# Patient Record
Sex: FEMALE | Race: WHITE | NOT HISPANIC OR LATINO | Employment: OTHER | URBAN - NONMETROPOLITAN AREA
[De-identification: names, ages, dates, MRNs, and addresses within clinical notes are randomized per-mention and may not be internally consistent; named-entity substitution may affect disease eponyms.]

---

## 2017-01-23 ENCOUNTER — DOCUMENTATION (OUTPATIENT)
Dept: GASTROENTEROLOGY | Facility: CLINIC | Age: 57
End: 2017-01-23

## 2017-01-23 ENCOUNTER — TELEPHONE (OUTPATIENT)
Dept: GASTROENTEROLOGY | Facility: CLINIC | Age: 57
End: 2017-01-23

## 2017-01-23 RX ORDER — ESOMEPRAZOLE MAGNESIUM 40 MG/1
CAPSULE, DELAYED RELEASE ORAL
Qty: 90 CAPSULE | Refills: 5 | Status: SHIPPED | OUTPATIENT
Start: 2017-01-23 | End: 2020-09-21 | Stop reason: ALTCHOICE

## 2017-01-23 RX ORDER — ESOMEPRAZOLE MAGNESIUM 40 MG/1
40 CAPSULE, DELAYED RELEASE ORAL DAILY
Qty: 30 CAPSULE | Refills: 5 | Status: SHIPPED | OUTPATIENT
Start: 2017-01-23 | End: 2017-01-23 | Stop reason: SDUPTHER

## 2017-01-23 NOTE — TELEPHONE ENCOUNTER
Patient called stating that she is taking protonix and it is not working. She stated that she sometimes takes Omeprazole too and it isn't working either. She stated that she has tried Nexium in the past and it worked really well for her. Thank you.

## 2017-01-23 NOTE — PROGRESS NOTES
I called pharmacy and told them not to renew Nexium prescription that had previously been approved through automated system.

## 2017-04-25 DIAGNOSIS — K59.09 CHRONIC CONSTIPATION: Primary | ICD-10-CM

## 2017-04-25 RX ORDER — LUBIPROSTONE 24 UG/1
24 CAPSULE ORAL 2 TIMES DAILY WITH MEALS
Qty: 60 CAPSULE | Refills: 5 | Status: SHIPPED | OUTPATIENT
Start: 2017-04-25 | End: 2020-09-21

## 2017-06-07 ENCOUNTER — TRANSCRIBE ORDERS (OUTPATIENT)
Dept: ADMINISTRATIVE | Facility: HOSPITAL | Age: 57
End: 2017-06-07

## 2017-06-07 DIAGNOSIS — Z12.31 VISIT FOR SCREENING MAMMOGRAM: Primary | ICD-10-CM

## 2017-06-19 ENCOUNTER — HOSPITAL ENCOUNTER (OUTPATIENT)
Dept: MAMMOGRAPHY | Facility: HOSPITAL | Age: 57
Discharge: HOME | End: 2017-06-19
Admitting: NURSE PRACTITIONER

## 2017-06-19 DIAGNOSIS — Z12.31 VISIT FOR SCREENING MAMMOGRAM: ICD-10-CM

## 2017-06-19 PROCEDURE — G0202 SCR MAMMO BI INCL CAD: HCPCS

## 2017-06-19 PROCEDURE — G0202 SCR MAMMO BI INCL CAD: HCPCS | Performed by: RADIOLOGY

## 2017-06-19 PROCEDURE — 77063 BREAST TOMOSYNTHESIS BI: CPT

## 2017-06-19 PROCEDURE — 77063 BREAST TOMOSYNTHESIS BI: CPT | Performed by: RADIOLOGY

## 2017-08-15 ENCOUNTER — OFFICE VISIT (OUTPATIENT)
Dept: CARDIOLOGY | Facility: CLINIC | Age: 57
End: 2017-08-15

## 2017-08-15 VITALS
SYSTOLIC BLOOD PRESSURE: 116 MMHG | HEART RATE: 76 BPM | DIASTOLIC BLOOD PRESSURE: 80 MMHG | WEIGHT: 198 LBS | BODY MASS INDEX: 32.99 KG/M2 | HEIGHT: 65 IN

## 2017-08-15 DIAGNOSIS — E78.5 DYSLIPIDEMIA: ICD-10-CM

## 2017-08-15 DIAGNOSIS — I10 ESSENTIAL HYPERTENSION: ICD-10-CM

## 2017-08-15 DIAGNOSIS — R09.89 BRUIT OF RIGHT CAROTID ARTERY: ICD-10-CM

## 2017-08-15 DIAGNOSIS — M54.2 NECK PAIN: ICD-10-CM

## 2017-08-15 DIAGNOSIS — R00.2 PALPITATIONS: Primary | ICD-10-CM

## 2017-08-15 PROCEDURE — 93000 ELECTROCARDIOGRAM COMPLETE: CPT | Performed by: INTERNAL MEDICINE

## 2017-08-15 PROCEDURE — 99213 OFFICE O/P EST LOW 20 MIN: CPT | Performed by: INTERNAL MEDICINE

## 2017-08-15 RX ORDER — DILTIAZEM HYDROCHLORIDE 240 MG/1
240 CAPSULE, EXTENDED RELEASE ORAL DAILY
Qty: 30 CAPSULE | Refills: 6 | Status: SHIPPED | OUTPATIENT
Start: 2017-08-15 | End: 2018-01-10

## 2017-08-15 RX ORDER — MEDROXYPROGESTERONE ACETATE 5 MG/1
5 TABLET ORAL DAILY
COMMUNITY
End: 2020-09-21 | Stop reason: ALTCHOICE

## 2017-08-15 NOTE — PROGRESS NOTES
Juan C Connell MD  Em MONTANEZ Janes  1960      Patient Active Problem List   Diagnosis   • Palpitations   • Dyslipidemia   • Hypertension   • Gastroesophageal reflux disease without esophagitis   • Pulmonary emphysema   • Arthritis   • Hives   • Neck pain   • Bruit of right carotid artery       Dear Juan C Connell MD:    Subjective     Em Marrero is a 56 y.o. female with the above medical problems who is here today for follow-up. The patient she has been doing better since her last visit.  She continues to have shortness of breath is related to COPD but this is currently at baseline.  She does complain of some palpitations are more frequency switching from carvedilol to diltiazem.  She also complains of occasional lower extremity edema that is mild.  According to the patient she's been having right-sided neck pain for the last few weeks which is intermittent, lasts for a few minutes and resolve spontaneously.  She states she can feel her pulse mildly in her neck when this happens.        Current Outpatient Prescriptions:   •  ALLERGY SERUM INJECTION, Inject  under the skin 1 (One) Time., Disp: , Rfl:   •  amitriptyline (ELAVIL) 10 MG tablet, Take 10 mg by mouth every night., Disp: , Rfl:   •  ANORO ELLIPTA 62.5-25 MCG/INH aerosol powder , INL 1 PUFF PO D, Disp: , Rfl: 2  •  baclofen (LIORESAL) 10 MG tablet, Take 10 mg by mouth 2 (Two) Times a Day., Disp: , Rfl:   •  dapagliflozin (FARXIGA) 5 MG tablet tablet, Take 5 mg by mouth Daily., Disp: , Rfl:   •  esomeprazole (nexIUM) 40 MG capsule, TAKE 1 CAPSULE BY MOUTH DAILY 30 MINUTES BEFORE BREAKFAST, Disp: 90 capsule, Rfl: 5  •  furosemide (LASIX) 20 MG tablet, Take 20 mg by mouth as needed, Disp: , Rfl:   •  gabapentin (NEURONTIN) 300 MG capsule, Take 300 mg by mouth 2 (two) times a day, Disp: , Rfl:   •  hydrochlorothiazide (MICROZIDE) 12.5 MG capsule, Take 12.5 mg by mouth daily., Disp: , Rfl:   •  ipratropium-albuterol (COMBIVENT RESPIMAT)  MCG/ACT  inhaler, Inhale 1 puff 6 (six) times a day., Disp: , Rfl:   •  loratadine (CLARITIN) 10 MG tablet, TK 1 T PO QD, Disp: , Rfl: 11  •  lubiprostone (AMITIZA) 24 MCG capsule, Take 1 capsule by mouth 2 (Two) Times a Day With Meals., Disp: 60 capsule, Rfl: 5  •  medroxyPROGESTERone (PROVERA) 5 MG tablet, Take 5 mg by mouth Daily., Disp: , Rfl:   •  montelukast (SINGULAIR) 10 MG tablet, TK 1 T PO QD, Disp: , Rfl: 11  •  O2 (OXYGEN), Inhale 2 L/min 1 (one) time, Disp: , Rfl:   •  ondansetron (ZOFRAN) 8 MG tablet, Take 1 tablet by mouth every 8 (eight) hours as needed for nausea or vomiting., Disp: 25 tablet, Rfl: 1  •  polyethylene glycol (MIRALAX) packet, Mix one capful with any liquid once daily., Disp: 527 g, Rfl: 5  •  progesterone (PROMETRIUM) 100 MG capsule, Take 100 mg by mouth daily., Disp: , Rfl:   •  clotrimazole (LOTRIMIN) 1 % external solution, Apply  topically 2 (two) times a day., Disp: 60 mL, Rfl: 0  •  diltiazem XR (DILACOR XR) 240 MG 24 hr capsule, Take 1 capsule by mouth Daily., Disp: 30 capsule, Rfl: 6  •  pantoprazole (PROTONIX) 40 MG EC tablet, TAKE 1 TABLET BY MOUTH DAILY 30 MINUTES BEFORE BREAKFAST, Disp: 90 tablet, Rfl: 5  •  potassium chloride (K-DUR) 10 MEQ CR tablet, Take 10 mEq by mouth as needed (PRN for leg cramps)., Disp: , Rfl:     The following portions of the patient's history were reviewed and updated as appropriate: allergies, current medications, past family history, past medical history, past social history, past surgical history and problem list.    Review of Systems   Constitution: Negative for chills, diaphoresis and fever.   HENT: Negative for congestion, ear pain and hearing loss.    Cardiovascular: Positive for palpitations. Negative for chest pain, leg swelling, orthopnea, paroxysmal nocturnal dyspnea and syncope.   Respiratory: Positive for shortness of breath. Negative for cough and hemoptysis.    Endocrine: Negative for cold intolerance and heat intolerance.  "  Hematologic/Lymphatic: Does not bruise/bleed easily.   Skin: Negative for rash.   Musculoskeletal: Positive for arthritis, back pain, joint pain and neck pain (Left side ). Negative for myalgias.   Gastrointestinal: Negative for abdominal pain, constipation, diarrhea, hematemesis, hematochezia, melena, nausea and vomiting.   Genitourinary: Negative for dysuria and hematuria.   Neurological: Negative for dizziness, focal weakness and numbness.       Objective   Blood pressure 116/80, pulse 76, height 65\" (165.1 cm), weight 198 lb (89.8 kg).    Physical Exam   Constitutional: She is oriented to person, place, and time. She appears well-developed and well-nourished.   WF sitting comfortably on chair.   HENT:   Mouth/Throat: Oropharynx is clear and moist.   Eyes: EOM are normal. Pupils are equal, round, and reactive to light.   Neck: Neck supple. No JVD present. No tracheal deviation present. No thyromegaly present.   Bruit present on the right side.   Cardiovascular: Normal rate, regular rhythm, S1 normal and S2 normal.  Exam reveals no gallop and no friction rub.    No murmur heard.  Pulmonary/Chest: Effort normal and breath sounds normal. No respiratory distress. She has no wheezes. She has no rales.   Abdominal: Soft. Bowel sounds are normal. She exhibits no mass. There is no tenderness.   Musculoskeletal: Normal range of motion. She exhibits no edema.   Lymphadenopathy:     She has no cervical adenopathy.   Neurological: She is alert and oriented to person, place, and time.   Skin: Skin is warm and dry. No rash noted.   Psychiatric: She has a normal mood and affect.         ECG 12 Lead  Date/Time: 8/15/2017 11:49 AM  Performed by: ROSANNA SAWYER  Authorized by: ROSANNA SAWYER   Comparison: compared with previous ECG from 10/18/2016  Similar to previous ECG  Rhythm: sinus rhythm  Rate: normal  BPM: 70  Conduction: conduction normal  ST Segments: ST segments normal  T Waves: T waves " normal  QRS axis: normal  Other: no other findings  Clinical impression: normal ECG and low voltage            Assessment/Plan     1.  Palpitations: The patient continues to complain of palpitations occurring diltiazem dose.  At this point will increase dose to 240 mg by mouth daily and monitor for improvement.    2.  Hypertension: Patient with history of hypertension currently within normal limits.  Will once again need to monitor for stability after increasing diltiazem dose.    3.  Dyslipidemia: The patient's lipid panel shows mildly elevated lipid levels, however she states she is unable to take any kind of statin due to leg cramping.     4.  Neck pain: Patient with neck pain socially right-sided carotid bruit is concerning for possible carotid artery disease.  At this point we'll evaluate further with ultrasound.     Diagnosis Plan   1. Palpitations     2. Essential hypertension     3. Dyslipidemia     4. Neck pain  US Carotid Right   5. Bruit of right carotid artery  US Carotid Right          Return in about 3 months (around 11/15/2017).    I appreciate the opportunity to participate in this patient's cardiovascular care.    Best Regards    Leonard Olivarez

## 2017-08-21 ENCOUNTER — HOSPITAL ENCOUNTER (OUTPATIENT)
Dept: ULTRASOUND IMAGING | Facility: HOSPITAL | Age: 57
Discharge: HOME | End: 2017-08-21
Attending: INTERNAL MEDICINE | Admitting: INTERNAL MEDICINE

## 2017-08-21 DIAGNOSIS — R09.89 BRUIT OF RIGHT CAROTID ARTERY: ICD-10-CM

## 2017-08-21 DIAGNOSIS — M54.2 NECK PAIN: ICD-10-CM

## 2017-08-21 PROCEDURE — 93880 EXTRACRANIAL BILAT STUDY: CPT

## 2017-08-21 PROCEDURE — 93880 EXTRACRANIAL BILAT STUDY: CPT | Performed by: RADIOLOGY

## 2017-11-21 ENCOUNTER — OFFICE VISIT (OUTPATIENT)
Dept: CARDIOLOGY | Facility: CLINIC | Age: 57
End: 2017-11-21

## 2017-11-21 VITALS
BODY MASS INDEX: 34.16 KG/M2 | OXYGEN SATURATION: 96 % | DIASTOLIC BLOOD PRESSURE: 73 MMHG | SYSTOLIC BLOOD PRESSURE: 114 MMHG | HEART RATE: 86 BPM | HEIGHT: 65 IN | WEIGHT: 205 LBS

## 2017-11-21 DIAGNOSIS — R07.2 PRECORDIAL PAIN: Primary | ICD-10-CM

## 2017-11-21 DIAGNOSIS — E78.5 DYSLIPIDEMIA: ICD-10-CM

## 2017-11-21 DIAGNOSIS — R00.2 PALPITATIONS: ICD-10-CM

## 2017-11-21 DIAGNOSIS — I10 ESSENTIAL HYPERTENSION: ICD-10-CM

## 2017-11-21 PROCEDURE — 99214 OFFICE O/P EST MOD 30 MIN: CPT | Performed by: INTERNAL MEDICINE

## 2017-11-21 RX ORDER — DILTIAZEM HYDROCHLORIDE 300 MG/1
300 CAPSULE, COATED, EXTENDED RELEASE ORAL DAILY
Qty: 30 CAPSULE | Refills: 6 | Status: SHIPPED | OUTPATIENT
Start: 2017-11-21 | End: 2018-07-10 | Stop reason: SDUPTHER

## 2017-11-21 RX ORDER — ACETAMINOPHEN 500 MG
500 TABLET ORAL EVERY 6 HOURS PRN
COMMUNITY
End: 2022-09-12

## 2017-11-21 NOTE — PROGRESS NOTES
Juan C Connell MD  Em Marrero  1960      Patient Active Problem List   Diagnosis   • Palpitations   • Dyslipidemia   • Hypertension   • Gastroesophageal reflux disease without esophagitis   • Pulmonary emphysema   • Arthritis   • Hives   • Neck pain   • Bruit of right carotid artery   • Precordial pain       Dear Juan C Connell MD:    Subjective     Em Marrero is a 57 y.o. female with the above medical problems who is here today for follow-up. According to the patient she has been having episodes of chest pain that she describes as midsternal, oppressive, 6/10 on the pain scale and associated with shortness of breath.  According to the agent the pain has no exacerbating or ameliorating factors.  She does have baseline COPD which is managed by her primary care team.  She was basically cranial neck pain and underwent a carotid ultrasound which showed no evidence of significant carotid artery disease.        Current Outpatient Prescriptions:   •  acetaminophen (TYLENOL) 500 MG tablet, Take 500 mg by mouth Every 6 (Six) Hours As Needed for Mild Pain ., Disp: , Rfl:   •  ALLERGY SERUM INJECTION, Inject  under the skin 1 (One) Time., Disp: , Rfl:   •  amitriptyline (ELAVIL) 10 MG tablet, Take 10 mg by mouth every night., Disp: , Rfl:   •  ANORO ELLIPTA 62.5-25 MCG/INH aerosol powder , INL 1 PUFF PO D, Disp: , Rfl: 2  •  baclofen (LIORESAL) 10 MG tablet, Take 10 mg by mouth 2 (Two) Times a Day. 2 tabs of the night., Disp: , Rfl:   •  dapagliflozin (FARXIGA) 5 MG tablet tablet, Take 5 mg by mouth Daily., Disp: , Rfl:   •  diltiazem XR (DILACOR XR) 240 MG 24 hr capsule, Take 1 capsule by mouth Daily., Disp: 30 capsule, Rfl: 6  •  esomeprazole (nexIUM) 40 MG capsule, TAKE 1 CAPSULE BY MOUTH DAILY 30 MINUTES BEFORE BREAKFAST, Disp: 90 capsule, Rfl: 5  •  furosemide (LASIX) 20 MG tablet, Take 20 mg by mouth as needed, Disp: , Rfl:   •  hydrochlorothiazide (MICROZIDE) 12.5 MG capsule, Take 12.5 mg by mouth daily.,  Disp: , Rfl:   •  ipratropium-albuterol (COMBIVENT RESPIMAT)  MCG/ACT inhaler, Inhale 1 puff 6 (six) times a day., Disp: , Rfl:   •  loratadine (CLARITIN) 10 MG tablet, TK 1 T PO QD, Disp: , Rfl: 11  •  lubiprostone (AMITIZA) 24 MCG capsule, Take 1 capsule by mouth 2 (Two) Times a Day With Meals., Disp: 60 capsule, Rfl: 5  •  medroxyPROGESTERone (PROVERA) 5 MG tablet, Take 5 mg by mouth Daily., Disp: , Rfl:   •  montelukast (SINGULAIR) 10 MG tablet, TK 1 T PO QD, Disp: , Rfl: 11  •  O2 (OXYGEN), Inhale 2 L/min 1 (one) time, Disp: , Rfl:   •  ondansetron (ZOFRAN) 8 MG tablet, Take 1 tablet by mouth every 8 (eight) hours as needed for nausea or vomiting., Disp: 25 tablet, Rfl: 1  •  diltiaZEM CD (CARDIZEM CD) 300 MG 24 hr capsule, Take 1 capsule by mouth Daily., Disp: 30 capsule, Rfl: 6    The following portions of the patient's history were reviewed and updated as appropriate: allergies, current medications, past family history, past medical history, past social history, past surgical history and problem list.    Review of Systems   Constitution: Negative for chills, diaphoresis and fever.   HENT: Negative for congestion, ear pain and sore throat.    Eyes: Negative for blurred vision, pain and redness.   Cardiovascular: Positive for chest pain and palpitations. Negative for leg swelling, orthopnea, paroxysmal nocturnal dyspnea and syncope.   Respiratory: Negative for cough, hemoptysis and shortness of breath.    Endocrine: Negative for cold intolerance and heat intolerance.   Hematologic/Lymphatic: Does not bruise/bleed easily.   Skin: Negative for rash.   Musculoskeletal: Positive for arthritis, back pain, joint pain, joint swelling and stiffness. Negative for myalgias.   Gastrointestinal: Negative for abdominal pain, constipation, diarrhea, hematemesis, hematochezia, melena, nausea and vomiting.   Genitourinary: Negative for dysuria and hematuria.   Neurological: Negative for dizziness, focal weakness and  "numbness.   Psychiatric/Behavioral: Negative for depression. The patient is not nervous/anxious.        Objective   Blood pressure 114/73, pulse 86, height 65\" (165.1 cm), weight 205 lb (93 kg), SpO2 96 %.    Physical Exam   Constitutional: She is oriented to person, place, and time. She appears well-developed and well-nourished.   WF sitting comfortably on chair.   HENT:   Mouth/Throat: Oropharynx is clear and moist.   Eyes: EOM are normal. Pupils are equal, round, and reactive to light.   Neck: Neck supple. No JVD present. No tracheal deviation present. No thyromegaly present.   Bruit present on the right side.   Cardiovascular: Normal rate, regular rhythm, S1 normal and S2 normal.  Exam reveals no gallop and no friction rub.    No murmur heard.  Pulmonary/Chest: Effort normal. No respiratory distress. She has no wheezes. She has rales.   Abdominal: Soft. Bowel sounds are normal. She exhibits no mass. There is no tenderness.   Musculoskeletal: Normal range of motion. She exhibits no edema.   Lymphadenopathy:     She has no cervical adenopathy.   Neurological: She is alert and oriented to person, place, and time.   Skin: Skin is warm and dry. No rash noted.   Psychiatric: She has a normal mood and affect.       Procedures    Assessment/Plan     1.  Chest pain: Patient with chest pain concerning for coronary artery disease.  At this point we will evaluate further with stress test.  She is unable to complete a treadmill stress test would request a nuclear stress test.  We'll also request an echocardiogram.    2.  Palpitations: According to the patient she has continued to have palpitations on current diltiazem dose.  This point we will increase to 300 mg by mouth daily.     3.  Hypertension: Patient with history of hypertension currently within normal limits.  Will once again need to monitor for stability after increasing diltiazem dose.    4.  Dyslipidemia: The patient's lipid panel shows mildly elevated lipid " levels, however she states she is unable to take any kind of statin due to leg cramping.  We will recheck a lipid panel.  I emphasized the need for decrease cholesterol intake.    5.  Neck pain: The patient underwent an echocardiogram which showed no evidence of significant cartilage artery disease.  She states her neck pain is now resolved.  At this point we'll continue to monitor for recurrence.       Diagnosis Plan   1. Precordial pain  Stress Test With Myocardial Perfusion (1 Day)    Adult Transthoracic Echo Complete W/ Cont if Necessary Per Protocol   2. Palpitations     3. Essential hypertension  Comprehensive Metabolic Panel   4. Dyslipidemia  Lipid Panel          Return in about 4 weeks (around 12/19/2017).    I appreciate the opportunity to participate in this patient's cardiovascular care.    Best Regards    Leonard Olivarez

## 2017-11-26 DIAGNOSIS — K59.09 CHRONIC CONSTIPATION: ICD-10-CM

## 2017-11-27 RX ORDER — LUBIPROSTONE 24 UG/1
CAPSULE, GELATIN COATED ORAL
Qty: 60 CAPSULE | Refills: 0 | OUTPATIENT
Start: 2017-11-27

## 2017-12-01 ENCOUNTER — TELEPHONE (OUTPATIENT)
Dept: CARDIOLOGY | Facility: CLINIC | Age: 57
End: 2017-12-01

## 2017-12-01 NOTE — TELEPHONE ENCOUNTER
Called Pt and informed her that she had missed her labs. Pt stated she was unaware that she needed labs and stated she would go Monday and get them done.

## 2017-12-04 ENCOUNTER — LAB (OUTPATIENT)
Dept: LAB | Facility: HOSPITAL | Age: 57
End: 2017-12-04

## 2017-12-04 DIAGNOSIS — I10 ESSENTIAL HYPERTENSION: ICD-10-CM

## 2017-12-04 DIAGNOSIS — E78.5 DYSLIPIDEMIA: ICD-10-CM

## 2017-12-04 LAB
ALBUMIN SERPL-MCNC: 4.2 G/DL (ref 3.5–5)
ALBUMIN/GLOB SERPL: 1.5 G/DL (ref 1.5–2.5)
ALP SERPL-CCNC: 98 U/L (ref 35–104)
ALT SERPL W P-5'-P-CCNC: 25 U/L (ref 10–36)
ANION GAP SERPL CALCULATED.3IONS-SCNC: 5.5 MMOL/L (ref 3.6–11.2)
AST SERPL-CCNC: 16 U/L (ref 10–30)
BILIRUB SERPL-MCNC: 0.4 MG/DL (ref 0.2–1.8)
BUN BLD-MCNC: 12 MG/DL (ref 7–21)
BUN/CREAT SERPL: 14 (ref 7–25)
CALCIUM SPEC-SCNC: 9.4 MG/DL (ref 7.7–10)
CHLORIDE SERPL-SCNC: 107 MMOL/L (ref 99–112)
CHOLEST SERPL-MCNC: 237 MG/DL (ref 0–200)
CO2 SERPL-SCNC: 26.5 MMOL/L (ref 24.3–31.9)
CREAT BLD-MCNC: 0.86 MG/DL (ref 0.43–1.29)
GFR SERPL CREATININE-BSD FRML MDRD: 68 ML/MIN/1.73
GLOBULIN UR ELPH-MCNC: 2.8 GM/DL
GLUCOSE BLD-MCNC: 136 MG/DL (ref 70–110)
HDLC SERPL-MCNC: 41 MG/DL (ref 60–100)
LDLC SERPL CALC-MCNC: 171 MG/DL (ref 0–100)
LDLC/HDLC SERPL: 4.17 {RATIO}
OSMOLALITY SERPL CALC.SUM OF ELEC: 279.4 MOSM/KG (ref 273–305)
POTASSIUM BLD-SCNC: 3.9 MMOL/L (ref 3.5–5.3)
PROT SERPL-MCNC: 7 G/DL (ref 6–8)
SODIUM BLD-SCNC: 139 MMOL/L (ref 135–153)
TRIGL SERPL-MCNC: 126 MG/DL (ref 0–150)
VLDLC SERPL-MCNC: 25.2 MG/DL

## 2017-12-04 PROCEDURE — 80053 COMPREHEN METABOLIC PANEL: CPT

## 2017-12-04 PROCEDURE — 36415 COLL VENOUS BLD VENIPUNCTURE: CPT

## 2017-12-04 PROCEDURE — 80061 LIPID PANEL: CPT

## 2017-12-13 ENCOUNTER — HOSPITAL ENCOUNTER (OUTPATIENT)
Dept: NUCLEAR MEDICINE | Facility: HOSPITAL | Age: 57
Discharge: HOME | End: 2017-12-13
Attending: INTERNAL MEDICINE

## 2017-12-13 ENCOUNTER — HOSPITAL ENCOUNTER (OUTPATIENT)
Dept: CARDIOLOGY | Facility: HOSPITAL | Age: 57
Discharge: HOME | End: 2017-12-13
Attending: INTERNAL MEDICINE

## 2017-12-13 DIAGNOSIS — R07.2 PRECORDIAL PAIN: ICD-10-CM

## 2017-12-13 LAB
BH CV ECHO MEAS - % IVS THICK: 6.4 %
BH CV ECHO MEAS - % LVPW THICK: 27.7 %
BH CV ECHO MEAS - ACS: 1.9 CM
BH CV ECHO MEAS - AO MAX PG: 10.9 MMHG
BH CV ECHO MEAS - AO MEAN PG: 4.6 MMHG
BH CV ECHO MEAS - AO ROOT AREA: 8.8 CM^2
BH CV ECHO MEAS - AO ROOT DIAM: 3.3 CM
BH CV ECHO MEAS - AO V2 MAX: 165.4 CM/SEC
BH CV ECHO MEAS - AO V2 MEAN: 99.2 CM/SEC
BH CV ECHO MEAS - AO V2 VTI: 34.6 CM
BH CV ECHO MEAS - CONTRAST EF 4CH: 62.5 ML/M^2
BH CV ECHO MEAS - EDV(CUBED): 111.4 ML
BH CV ECHO MEAS - EDV(MOD-SP4): 32 ML
BH CV ECHO MEAS - EDV(TEICH): 108.1 ML
BH CV ECHO MEAS - EF(CUBED): 67.4 %
BH CV ECHO MEAS - EF(MOD-SP4): 62.5 %
BH CV ECHO MEAS - EF(TEICH): 58.9 %
BH CV ECHO MEAS - ESV(CUBED): 36.3 ML
BH CV ECHO MEAS - ESV(MOD-SP4): 12 ML
BH CV ECHO MEAS - ESV(TEICH): 44.5 ML
BH CV ECHO MEAS - FS: 31.2 %
BH CV ECHO MEAS - IVS/LVPW: 0.95
BH CV ECHO MEAS - IVSD: 1 CM
BH CV ECHO MEAS - IVSS: 1.1 CM
BH CV ECHO MEAS - LA DIMENSION: 2.8 CM
BH CV ECHO MEAS - LA/AO: 0.82
BH CV ECHO MEAS - LV MASS(C)D: 182.2 GRAMS
BH CV ECHO MEAS - LV MASS(C)S: 130.3 GRAMS
BH CV ECHO MEAS - LVIDD: 4.8 CM
BH CV ECHO MEAS - LVIDS: 3.3 CM
BH CV ECHO MEAS - LVLD AP4: 5.4 CM
BH CV ECHO MEAS - LVLS AP4: 4.6 CM
BH CV ECHO MEAS - LVOT AREA (M): 3.5 CM^2
BH CV ECHO MEAS - LVOT AREA: 3.6 CM^2
BH CV ECHO MEAS - LVOT DIAM: 2.1 CM
BH CV ECHO MEAS - LVPWD: 1.1 CM
BH CV ECHO MEAS - LVPWS: 1.4 CM
BH CV ECHO MEAS - MV A MAX VEL: 65.2 CM/SEC
BH CV ECHO MEAS - MV E MAX VEL: 57.3 CM/SEC
BH CV ECHO MEAS - MV E/A: 0.88
BH CV ECHO MEAS - PA ACC SLOPE: 1026 CM/SEC^2
BH CV ECHO MEAS - PA ACC TIME: 0.09 SEC
BH CV ECHO MEAS - PA PR(ACCEL): 39.4 MMHG
BH CV ECHO MEAS - RAP SYSTOLE: 10 MMHG
BH CV ECHO MEAS - RVDD: 2.5 CM
BH CV ECHO MEAS - RVSP: 33.1 MMHG
BH CV ECHO MEAS - SV(AO): 304.2 ML
BH CV ECHO MEAS - SV(CUBED): 75.1 ML
BH CV ECHO MEAS - SV(MOD-SP4): 20 ML
BH CV ECHO MEAS - SV(TEICH): 63.6 ML
BH CV ECHO MEAS - TR MAX VEL: 240.3 CM/SEC
BH CV NUCLEAR PRIOR STUDY: 3
BH CV STRESS BP STAGE 1: NORMAL
BH CV STRESS BP STAGE 2: NORMAL
BH CV STRESS COMMENTS STAGE 1: NORMAL
BH CV STRESS COMMENTS STAGE 2: NORMAL
BH CV STRESS DOSE REGADENOSON STAGE 1: 0.4
BH CV STRESS DURATION MIN STAGE 1: 0
BH CV STRESS DURATION MIN STAGE 2: 4
BH CV STRESS DURATION SEC STAGE 1: 15
BH CV STRESS DURATION SEC STAGE 2: 0
BH CV STRESS HR STAGE 1: 115
BH CV STRESS HR STAGE 2: 99
BH CV STRESS PROTOCOL 1: NORMAL
BH CV STRESS RECOVERY BP: NORMAL MMHG
BH CV STRESS RECOVERY HR: 99 BPM
BH CV STRESS STAGE 1: 1
BH CV STRESS STAGE 2: 2
LV EF 2D ECHO EST: 65 %
LV EF NUC BP: 65 %
MAXIMAL PREDICTED HEART RATE: 162 BPM
MAXIMAL PREDICTED HEART RATE: 162 BPM
PERCENT MAX PREDICTED HR: 70.99 %
STRESS BASELINE BP: NORMAL MMHG
STRESS BASELINE HR: 97 BPM
STRESS PERCENT HR: 84 %
STRESS POST PEAK BP: NORMAL MMHG
STRESS POST PEAK HR: 115 BPM
STRESS TARGET HR: 138 BPM
STRESS TARGET HR: 138 BPM

## 2017-12-13 PROCEDURE — 93017 CV STRESS TEST TRACING ONLY: CPT

## 2017-12-13 PROCEDURE — 93018 CV STRESS TEST I&R ONLY: CPT | Performed by: INTERNAL MEDICINE

## 2017-12-13 PROCEDURE — 0 TECHNETIUM SESTAMIBI: Performed by: INTERNAL MEDICINE

## 2017-12-13 PROCEDURE — A9500 TC99M SESTAMIBI: HCPCS | Performed by: INTERNAL MEDICINE

## 2017-12-13 PROCEDURE — 25010000002 REGADENOSON 0.4 MG/5ML SOLUTION: Performed by: INTERNAL MEDICINE

## 2017-12-13 PROCEDURE — 93306 TTE W/DOPPLER COMPLETE: CPT

## 2017-12-13 PROCEDURE — 93306 TTE W/DOPPLER COMPLETE: CPT | Performed by: INTERNAL MEDICINE

## 2017-12-13 PROCEDURE — 78452 HT MUSCLE IMAGE SPECT MULT: CPT | Performed by: INTERNAL MEDICINE

## 2017-12-13 PROCEDURE — 78452 HT MUSCLE IMAGE SPECT MULT: CPT

## 2017-12-13 RX ADMIN — REGADENOSON 0.4 MG: 0.08 INJECTION, SOLUTION INTRAVENOUS at 10:15

## 2017-12-13 RX ADMIN — TECHNETIUM TC-99M SESTAMIBI 1 DOSE: 1 INJECTION INTRAVENOUS at 10:15

## 2017-12-13 RX ADMIN — TECHNETIUM TC-99M SESTAMIBI 1 DOSE: 1 INJECTION INTRAVENOUS at 08:50

## 2017-12-19 ENCOUNTER — APPOINTMENT (OUTPATIENT)
Dept: GENERAL RADIOLOGY | Facility: HOSPITAL | Age: 57
End: 2017-12-19

## 2017-12-19 ENCOUNTER — HOSPITAL ENCOUNTER (EMERGENCY)
Facility: HOSPITAL | Age: 57
Discharge: HOME | End: 2017-12-19
Attending: EMERGENCY MEDICINE | Admitting: EMERGENCY MEDICINE

## 2017-12-19 VITALS
DIASTOLIC BLOOD PRESSURE: 90 MMHG | TEMPERATURE: 97.7 F | BODY MASS INDEX: 33.32 KG/M2 | SYSTOLIC BLOOD PRESSURE: 148 MMHG | HEART RATE: 80 BPM | WEIGHT: 200 LBS | RESPIRATION RATE: 20 BRPM | HEIGHT: 65 IN | OXYGEN SATURATION: 96 %

## 2017-12-19 DIAGNOSIS — J20.9 ACUTE BRONCHITIS, UNSPECIFIED ORGANISM: Primary | ICD-10-CM

## 2017-12-19 LAB
ANION GAP SERPL CALCULATED.3IONS-SCNC: 6.6 MMOL/L (ref 3.6–11.2)
BASOPHILS # BLD AUTO: 0.04 10*3/MM3 (ref 0–0.3)
BASOPHILS NFR BLD AUTO: 0.4 % (ref 0–2)
BUN BLD-MCNC: 17 MG/DL (ref 7–21)
BUN/CREAT SERPL: 18.9 (ref 7–25)
CALCIUM SPEC-SCNC: 9.5 MG/DL (ref 7.7–10)
CHLORIDE SERPL-SCNC: 105 MMOL/L (ref 99–112)
CO2 SERPL-SCNC: 29.4 MMOL/L (ref 24.3–31.9)
CREAT BLD-MCNC: 0.9 MG/DL (ref 0.43–1.29)
DEPRECATED RDW RBC AUTO: 40.6 FL (ref 37–54)
EOSINOPHIL # BLD AUTO: 0.25 10*3/MM3 (ref 0–0.7)
EOSINOPHIL NFR BLD AUTO: 2.8 % (ref 0–5)
ERYTHROCYTE [DISTWIDTH] IN BLOOD BY AUTOMATED COUNT: 13.6 % (ref 11.5–14.5)
GFR SERPL CREATININE-BSD FRML MDRD: 64 ML/MIN/1.73
GLUCOSE BLD-MCNC: 123 MG/DL (ref 70–110)
HCT VFR BLD AUTO: 44.8 % (ref 37–47)
HGB BLD-MCNC: 14.5 G/DL (ref 12–16)
IMM GRANULOCYTES # BLD: 0.05 10*3/MM3 (ref 0–0.03)
IMM GRANULOCYTES NFR BLD: 0.6 % (ref 0–0.5)
LYMPHOCYTES # BLD AUTO: 3.67 10*3/MM3 (ref 1–3)
LYMPHOCYTES NFR BLD AUTO: 41.2 % (ref 21–51)
MCH RBC QN AUTO: 26.5 PG (ref 27–33)
MCHC RBC AUTO-ENTMCNC: 32.4 G/DL (ref 33–37)
MCV RBC AUTO: 81.8 FL (ref 80–94)
MONOCYTES # BLD AUTO: 0.66 10*3/MM3 (ref 0.1–0.9)
MONOCYTES NFR BLD AUTO: 7.4 % (ref 0–10)
NEUTROPHILS # BLD AUTO: 4.23 10*3/MM3 (ref 1.4–6.5)
NEUTROPHILS NFR BLD AUTO: 47.6 % (ref 30–70)
OSMOLALITY SERPL CALC.SUM OF ELEC: 284.2 MOSM/KG (ref 273–305)
PLATELET # BLD AUTO: 261 10*3/MM3 (ref 130–400)
PMV BLD AUTO: 10.4 FL (ref 6–10)
POTASSIUM BLD-SCNC: 3.7 MMOL/L (ref 3.5–5.3)
RBC # BLD AUTO: 5.48 10*6/MM3 (ref 4.2–5.4)
SODIUM BLD-SCNC: 141 MMOL/L (ref 135–153)
WBC NRBC COR # BLD: 8.9 10*3/MM3 (ref 4.5–12.5)

## 2017-12-19 PROCEDURE — 80048 BASIC METABOLIC PNL TOTAL CA: CPT | Performed by: PHYSICIAN ASSISTANT

## 2017-12-19 PROCEDURE — 25010000002 CEFTRIAXONE PER 250 MG: Performed by: PHYSICIAN ASSISTANT

## 2017-12-19 PROCEDURE — 94640 AIRWAY INHALATION TREATMENT: CPT

## 2017-12-19 PROCEDURE — 99283 EMERGENCY DEPT VISIT LOW MDM: CPT

## 2017-12-19 PROCEDURE — 96372 THER/PROPH/DIAG INJ SC/IM: CPT

## 2017-12-19 PROCEDURE — 85025 COMPLETE CBC W/AUTO DIFF WBC: CPT | Performed by: PHYSICIAN ASSISTANT

## 2017-12-19 PROCEDURE — 94799 UNLISTED PULMONARY SVC/PX: CPT

## 2017-12-19 PROCEDURE — 71020 XR CHEST 2 VW: CPT | Performed by: RADIOLOGY

## 2017-12-19 PROCEDURE — 36415 COLL VENOUS BLD VENIPUNCTURE: CPT

## 2017-12-19 PROCEDURE — 71020 HC CHEST PA AND LATERAL: CPT

## 2017-12-19 RX ORDER — IPRATROPIUM BROMIDE AND ALBUTEROL SULFATE 2.5; .5 MG/3ML; MG/3ML
3 SOLUTION RESPIRATORY (INHALATION) ONCE
Status: COMPLETED | OUTPATIENT
Start: 2017-12-19 | End: 2017-12-19

## 2017-12-19 RX ORDER — LIDOCAINE HYDROCHLORIDE 10 MG/ML
2.1 INJECTION, SOLUTION EPIDURAL; INFILTRATION; INTRACAUDAL; PERINEURAL ONCE
Status: COMPLETED | OUTPATIENT
Start: 2017-12-19 | End: 2017-12-19

## 2017-12-19 RX ORDER — ALBUTEROL SULFATE 2.5 MG/3ML
2.5 SOLUTION RESPIRATORY (INHALATION) EVERY 4 HOURS PRN
Qty: 3 ML | Refills: 0 | Status: SHIPPED | OUTPATIENT
Start: 2017-12-19 | End: 2018-01-18

## 2017-12-19 RX ORDER — AZITHROMYCIN 250 MG/1
TABLET, FILM COATED ORAL
Qty: 6 TABLET | Refills: 0 | Status: SHIPPED | OUTPATIENT
Start: 2017-12-19 | End: 2018-08-16

## 2017-12-19 RX ORDER — BENZONATATE 100 MG/1
100 CAPSULE ORAL 3 TIMES DAILY PRN
Qty: 30 CAPSULE | Refills: 0 | Status: SHIPPED | OUTPATIENT
Start: 2017-12-19 | End: 2018-08-16

## 2017-12-19 RX ORDER — CEFTRIAXONE 1 G/1
1000 INJECTION, POWDER, FOR SOLUTION INTRAMUSCULAR; INTRAVENOUS ONCE
Status: COMPLETED | OUTPATIENT
Start: 2017-12-19 | End: 2017-12-19

## 2017-12-19 RX ADMIN — IPRATROPIUM BROMIDE AND ALBUTEROL SULFATE 3 ML: .5; 3 SOLUTION RESPIRATORY (INHALATION) at 12:36

## 2017-12-19 RX ADMIN — CEFTRIAXONE 1000 MG: 1 INJECTION, POWDER, FOR SOLUTION INTRAMUSCULAR; INTRAVENOUS at 14:28

## 2017-12-19 RX ADMIN — LIDOCAINE HYDROCHLORIDE 2.1 ML: 10 INJECTION, SOLUTION EPIDURAL; INFILTRATION; INTRACAUDAL; PERINEURAL at 14:28

## 2017-12-19 NOTE — ED PROVIDER NOTES
Subjective   Patient is a 57 y.o. female presenting with URI.   URI   Presenting symptoms: congestion and cough    Congestion:     Location:  Nasal    Interferes with sleep: no      Interferes with eating/drinking: no    Cough:     Cough characteristics:  Productive    Sputum characteristics:  Yellow    Severity:  Moderate    Onset quality:  Gradual    Duration:  3 weeks    Timing:  Constant    Progression:  Worsening    Chronicity:  New  Severity:  Moderate  Onset quality:  Gradual  Duration:  3 weeks  Timing:  Constant  Progression:  Worsening  Chronicity:  New  Relieved by:  Nothing  Worsened by:  Nothing  Ineffective treatments:  None tried  Associated symptoms: no arthralgias, no headaches, no myalgias, no neck pain, no sinus pain, no sneezing, no swollen glands and no wheezing    Risk factors: not elderly, no chronic cardiac disease, no chronic kidney disease, no chronic respiratory disease, no diabetes mellitus, no immunosuppression, no recent illness, no recent travel and no sick contacts        Review of Systems   HENT: Positive for congestion. Negative for sinus pain and sneezing.    Respiratory: Positive for cough. Negative for wheezing.    Musculoskeletal: Negative for arthralgias, myalgias and neck pain.   Neurological: Negative for headaches.   All other systems reviewed and are negative.      Past Medical History:   Diagnosis Date   • Dyslipidemia    • GERD (gastroesophageal reflux disease)    • Hypertension    • Hypokalemia    • Palpitations        Allergies   Allergen Reactions   • Coreg [Carvedilol] Shortness Of Breath   • Dexamethasone    • Ketorolac    • Ketorolac Tromethamine    • Meloxicam      Blister on lip   • Metoprolol    • Synthroid [Levothyroxine Sodium]      Blister on lip       Past Surgical History:   Procedure Laterality Date   • BLADDER SURGERY     • BREAST BIOPSY Right 8 YRS AGO   • CHOLECYSTECTOMY     • COLONOSCOPY      Negative   • CORONARY ANGIOPLASTY Left 2000   • HYSTERECTOMY          Family History   Problem Relation Age of Onset   • Breast cancer Sister    • Coronary artery disease Sister 50       Social History     Social History   • Marital status: Legally      Spouse name: N/A   • Number of children: N/A   • Years of education: N/A     Social History Main Topics   • Smoking status: Current Every Day Smoker     Types: Electronic Cigarette   • Smokeless tobacco: Not on file   • Alcohol use No   • Drug use: No   • Sexual activity: Not on file     Other Topics Concern   • Not on file     Social History Narrative           Objective   Physical Exam   Constitutional: She is oriented to person, place, and time. Vital signs are normal. She appears well-developed and well-nourished.   HENT:   Head: Normocephalic and atraumatic.   Right Ear: External ear normal.   Left Ear: External ear normal.   Nose: Mucosal edema and rhinorrhea present.   Mouth/Throat: Uvula is midline and oropharynx is clear and moist.   Eyes: Conjunctivae and EOM are normal. Pupils are equal, round, and reactive to light.   Neck: Normal range of motion. Neck supple.   Cardiovascular: Normal rate, regular rhythm and normal heart sounds.    Pulmonary/Chest: Effort normal and breath sounds normal.   Abdominal: Soft.   Neurological: She is alert and oriented to person, place, and time. She has normal reflexes. GCS eye subscore is 4. GCS verbal subscore is 5. GCS motor subscore is 6.   Skin: Skin is warm and dry.   Nursing note and vitals reviewed.      Procedures         ED Course  ED Course                  MDM  Number of Diagnoses or Management Options  Acute bronchitis, unspecified organism: new and requires workup     Amount and/or Complexity of Data Reviewed  Clinical lab tests: reviewed and ordered  Tests in the radiology section of CPT®: reviewed and ordered  Tests in the medicine section of CPT®: ordered and reviewed    Risk of Complications, Morbidity, and/or Mortality  Presenting problems:  moderate  Diagnostic procedures: moderate  Management options: moderate    Patient Progress  Patient progress: stable      Final diagnoses:   None            ADALID Ulloa  12/19/17 [id number]

## 2018-01-10 ENCOUNTER — OFFICE VISIT (OUTPATIENT)
Dept: CARDIOLOGY | Facility: CLINIC | Age: 58
End: 2018-01-10

## 2018-01-10 VITALS
RESPIRATION RATE: 16 BRPM | SYSTOLIC BLOOD PRESSURE: 116 MMHG | WEIGHT: 205 LBS | BODY MASS INDEX: 34.16 KG/M2 | DIASTOLIC BLOOD PRESSURE: 76 MMHG | HEART RATE: 80 BPM | HEIGHT: 65 IN

## 2018-01-10 DIAGNOSIS — R07.2 PRECORDIAL PAIN: Primary | ICD-10-CM

## 2018-01-10 DIAGNOSIS — R00.2 PALPITATIONS: ICD-10-CM

## 2018-01-10 DIAGNOSIS — I10 ESSENTIAL HYPERTENSION: ICD-10-CM

## 2018-01-10 DIAGNOSIS — E78.5 DYSLIPIDEMIA: ICD-10-CM

## 2018-01-10 PROCEDURE — 99213 OFFICE O/P EST LOW 20 MIN: CPT | Performed by: INTERNAL MEDICINE

## 2018-01-10 RX ORDER — FUROSEMIDE 20 MG/1
20 TABLET ORAL AS NEEDED
Qty: 30 TABLET | Refills: 6 | Status: SHIPPED | OUTPATIENT
Start: 2018-01-10 | End: 2023-03-07 | Stop reason: SDUPTHER

## 2018-01-10 RX ORDER — ROSUVASTATIN CALCIUM 40 MG/1
40 TABLET, COATED ORAL DAILY
Qty: 30 TABLET | Refills: 11 | Status: SHIPPED | OUTPATIENT
Start: 2018-01-10 | End: 2018-01-24

## 2018-01-10 NOTE — PROGRESS NOTES
Juan C Connell MD  Em Marrero  1960      Patient Active Problem List   Diagnosis   • Palpitations   • Dyslipidemia   • Hypertension   • Gastroesophageal reflux disease without esophagitis   • Pulmonary emphysema   • Arthritis   • Hives   • Neck pain   • Bruit of right carotid artery   • Precordial pain       Dear Juan C Connell MD:    Subjective     Em Marrero is a 57 y.o. female with the above medical problems who is here today for follow-up.The patient states she has been doing fairly well since her last visit.  She states she has not had any further episode of chest pain.  She states she has been having episodes of palpitations that she describes as her heart flipping for a few minutes.  She states this episodes occur every other day and can last for a few minutes.  She denies any exacerbating or ameliorating factors.  She states the intensity is mild.  She also states she has occasional lower extremity edema for which she takes Lasix 20 mg by mouth as needed.  She states this is well controlled with this.          Current Outpatient Prescriptions:   •  acetaminophen (TYLENOL) 500 MG tablet, Take 500 mg by mouth Every 6 (Six) Hours As Needed for Mild Pain ., Disp: , Rfl:   •  albuterol (PROVENTIL) (2.5 MG/3ML) 0.083% nebulizer solution, Take 2.5 mg by nebulization Every 4 (Four) Hours As Needed for Wheezing for up to 30 days., Disp: 3 mL, Rfl: 0  •  ALLERGY SERUM INJECTION, Inject  under the skin 1 (One) Time., Disp: , Rfl:   •  amitriptyline (ELAVIL) 10 MG tablet, Take 10 mg by mouth every night., Disp: , Rfl:   •  ANORO ELLIPTA 62.5-25 MCG/INH aerosol powder , INL 1 PUFF PO D, Disp: , Rfl: 2  •  baclofen (LIORESAL) 10 MG tablet, Take 10 mg by mouth 2 (Two) Times a Day. 2 tabs of the night., Disp: , Rfl:   •  dapagliflozin (FARXIGA) 5 MG tablet tablet, Take 5 mg by mouth Daily., Disp: , Rfl:   •  diltiaZEM CD (CARDIZEM CD) 300 MG 24 hr capsule, Take 1 capsule by mouth Daily., Disp: 30 capsule, Rfl:  6  •  esomeprazole (nexIUM) 40 MG capsule, TAKE 1 CAPSULE BY MOUTH DAILY 30 MINUTES BEFORE BREAKFAST, Disp: 90 capsule, Rfl: 5  •  furosemide (LASIX) 20 MG tablet, Take 1 tablet by mouth As Needed (LE edema)., Disp: 30 tablet, Rfl: 6  •  hydrochlorothiazide (MICROZIDE) 12.5 MG capsule, Take 12.5 mg by mouth daily., Disp: , Rfl:   •  ipratropium-albuterol (COMBIVENT RESPIMAT)  MCG/ACT inhaler, Inhale 1 puff 6 (six) times a day., Disp: , Rfl:   •  lubiprostone (AMITIZA) 24 MCG capsule, Take 1 capsule by mouth 2 (Two) Times a Day With Meals., Disp: 60 capsule, Rfl: 5  •  medroxyPROGESTERone (PROVERA) 5 MG tablet, Take 5 mg by mouth Daily., Disp: , Rfl:   •  montelukast (SINGULAIR) 10 MG tablet, TK 1 T PO QD, Disp: , Rfl: 11  •  O2 (OXYGEN), Inhale 2 L/min 1 (one) time, Disp: , Rfl:   •  ondansetron (ZOFRAN) 8 MG tablet, Take 1 tablet by mouth every 8 (eight) hours as needed for nausea or vomiting., Disp: 25 tablet, Rfl: 1  •  azithromycin (ZITHROMAX Z-ALBINA) 250 MG tablet, Take 2 tablets the first day, then 1 tablet daily for 4 days., Disp: 6 tablet, Rfl: 0  •  benzonatate (TESSALON) 100 MG capsule, Take 1 capsule by mouth 3 (Three) Times a Day As Needed for Cough., Disp: 30 capsule, Rfl: 0  •  loratadine (CLARITIN) 10 MG tablet, TK 1 T PO QD, Disp: , Rfl: 11  •  rosuvastatin (CRESTOR) 40 MG tablet, Take 1 tablet by mouth Daily., Disp: 30 tablet, Rfl: 11    The following portions of the patient's history were reviewed and updated as appropriate: allergies, current medications, past family history, past medical history, past social history, past surgical history and problem list.    Review of Systems   Constitution: Negative for chills, diaphoresis, fever, weakness and malaise/fatigue.   HENT: Positive for congestion. Negative for ear pain and sore throat.    Eyes: Negative for blurred vision, pain and redness.   Cardiovascular: Positive for leg swelling and palpitations. Negative for chest pain, orthopnea and  "paroxysmal nocturnal dyspnea.   Respiratory: Negative for cough, hemoptysis and shortness of breath.    Endocrine: Negative for cold intolerance and heat intolerance.   Hematologic/Lymphatic: Does not bruise/bleed easily.   Skin: Negative for rash.   Musculoskeletal: Positive for arthritis. Negative for myalgias.   Gastrointestinal: Positive for constipation. Negative for abdominal pain, diarrhea, hematemesis, hematochezia, melena, nausea and vomiting.   Genitourinary: Negative for dysuria and hematuria.   Neurological: Negative for dizziness, focal weakness and numbness.   Psychiatric/Behavioral: Negative for depression. The patient is not nervous/anxious.        Objective   Blood pressure 116/76, pulse 80, resp. rate 16, height 165.1 cm (65\"), weight 93 kg (205 lb).    Physical Exam   Constitutional: She is oriented to person, place, and time. She appears well-developed and well-nourished.   WF sitting comfortably on chair.   HENT:   Mouth/Throat: Oropharynx is clear and moist.   Eyes: EOM are normal. Pupils are equal, round, and reactive to light.   Neck: Neck supple. No JVD present. No tracheal deviation present. No thyromegaly present.   Bruit present on the right side.   Cardiovascular: Normal rate, regular rhythm, S1 normal and S2 normal.  Exam reveals no gallop and no friction rub.    No murmur heard.  Pulmonary/Chest: Effort normal. No respiratory distress. She has no wheezes. She has rales.   Abdominal: Soft. Bowel sounds are normal. She exhibits no mass. There is no tenderness.   Musculoskeletal: Normal range of motion. She exhibits no edema.   Lymphadenopathy:     She has no cervical adenopathy.   Neurological: She is alert and oriented to person, place, and time.   Skin: Skin is warm and dry. No rash noted.   Psychiatric: She has a normal mood and affect.       Procedures    Assessment/Plan     1.  Chest pain: The patient has had no further episodes of chest pain since her last visit.  She underwent a " stress test which showed no evidence of ischemia and an echocardiogram that showed a normal ejection fraction and no wall motion abnormalities.  At this point it appears her chest pain was noncardiac in nature.  We will monitor for recurrence.    2.  Palpitations: The patient continues to complain of palpitations.  She has not undergone monitoring in over a year at this point.  We will request a Holter monitor to evaluate further.    3.  Hypertension: Patient with history of hypertension currently within normal limits.  Will once again need to monitor for stability after increasing diltiazem dose.    4.  Dyslipidemia: According to the patient she was prescribed Crestor by her primary care provider but could not afford this medication as she was told to be $300 a month.  At this point rosuvastatin is generic so this should not be that expensive.  We will repeat prescribed as a generic name and hopefully she can get this medication started.  The last lipid panel showed very abnormal lipids with an elevated LDL.       Diagnosis Plan   1. Precordial pain     2. Palpitations  Holter Monitor - 24 Hour   3. Essential hypertension     4. Dyslipidemia          Return in about 3 months (around 4/10/2018).    I appreciate the opportunity to participate in this patient's cardiovascular care.    Best Regards    Leonard Olivarez

## 2018-01-24 ENCOUNTER — TELEPHONE (OUTPATIENT)
Dept: CARDIOLOGY | Facility: CLINIC | Age: 58
End: 2018-01-24

## 2018-01-24 RX ORDER — PRAVASTATIN SODIUM 80 MG/1
80 TABLET ORAL NIGHTLY
Qty: 30 TABLET | Refills: 6 | Status: SHIPPED | OUTPATIENT
Start: 2018-01-24 | End: 2018-07-10

## 2018-01-24 NOTE — TELEPHONE ENCOUNTER
This medication should be $10.  If she cannot afford this will change the medication to simvastatin but it is likely to have a lot more side effects.

## 2018-01-24 NOTE — TELEPHONE ENCOUNTER
Patients pharmacy called stated the Rosuvastatin is still not covered on patient insurance even though it is a generic. With patient insurance this still would need to try and fail 2-3 other higher dose statins before they will pay for this medication.

## 2018-02-22 ENCOUNTER — TRANSCRIBE ORDERS (OUTPATIENT)
Dept: ADMINISTRATIVE | Facility: HOSPITAL | Age: 58
End: 2018-02-22

## 2018-02-22 DIAGNOSIS — J34.2 DEVIATED NASAL SEPTUM: Primary | ICD-10-CM

## 2018-03-05 ENCOUNTER — HOSPITAL ENCOUNTER (OUTPATIENT)
Dept: CT IMAGING | Facility: HOSPITAL | Age: 58
Discharge: HOME | End: 2018-03-05
Attending: OTOLARYNGOLOGY | Admitting: OTOLARYNGOLOGY

## 2018-03-05 DIAGNOSIS — J34.2 DEVIATED NASAL SEPTUM: ICD-10-CM

## 2018-03-05 PROCEDURE — 70486 CT MAXILLOFACIAL W/O DYE: CPT | Performed by: RADIOLOGY

## 2018-03-05 PROCEDURE — 70486 CT MAXILLOFACIAL W/O DYE: CPT

## 2018-04-11 ENCOUNTER — OFFICE VISIT (OUTPATIENT)
Dept: CARDIOLOGY | Facility: CLINIC | Age: 58
End: 2018-04-11

## 2018-04-11 VITALS
WEIGHT: 209 LBS | SYSTOLIC BLOOD PRESSURE: 113 MMHG | HEIGHT: 65 IN | HEART RATE: 75 BPM | BODY MASS INDEX: 34.82 KG/M2 | DIASTOLIC BLOOD PRESSURE: 77 MMHG | RESPIRATION RATE: 16 BRPM

## 2018-04-11 DIAGNOSIS — I10 ESSENTIAL HYPERTENSION: ICD-10-CM

## 2018-04-11 DIAGNOSIS — R00.2 PALPITATIONS: Primary | ICD-10-CM

## 2018-04-11 DIAGNOSIS — E78.5 DYSLIPIDEMIA: ICD-10-CM

## 2018-04-11 PROCEDURE — 99213 OFFICE O/P EST LOW 20 MIN: CPT | Performed by: INTERNAL MEDICINE

## 2018-04-11 RX ORDER — ESTRADIOL 1 MG/1
1 TABLET ORAL DAILY
COMMUNITY
End: 2021-11-16

## 2018-04-11 NOTE — PROGRESS NOTES
Juan C Connell MD  Em Marrero  1960      Patient Active Problem List   Diagnosis   • Palpitations   • Dyslipidemia   • Hypertension   • Gastroesophageal reflux disease without esophagitis   • Pulmonary emphysema   • Arthritis   • Hives   • Neck pain   • Bruit of right carotid artery   • Precordial pain       Dear Juan C Connell MD:    Subjective     Em Marrero is a 57 y.o. female with the above medical problems who is here today for follow-up.According to the patient she has been doing fairly well since her last visit.  She denies any chest pain, shortness of breath, orthopnea, PND, lower extremity edema, dizziness or syncope.  She does complain of occasional palpitations that are still present.  She states she feels racing for a few minutes after which the palpitations resolved.  She denies any exacerbating or ameliorating factors.  She denies any associated symptoms.  She states intensity is mild.  She was supposed to undergo a Holter monitor last visit but did not complete this test.  She states she no longer has to lower extremity edema done was previously a problem.  She has a history of hypertension that appears to be very well controlled on current regimen.           Current Outpatient Prescriptions:   •  acetaminophen (TYLENOL) 500 MG tablet, Take 500 mg by mouth Every 6 (Six) Hours As Needed for Mild Pain ., Disp: , Rfl:   •  ALLERGY SERUM INJECTION, Inject  under the skin 1 (One) Time., Disp: , Rfl:   •  amitriptyline (ELAVIL) 10 MG tablet, Take 10 mg by mouth every night., Disp: , Rfl:   •  ANORO ELLIPTA 62.5-25 MCG/INH aerosol powder , INL 1 PUFF PO D, Disp: , Rfl: 2  •  baclofen (LIORESAL) 10 MG tablet, Take 10 mg by mouth 2 (Two) Times a Day. 2 tabs of the night., Disp: , Rfl:   •  dapagliflozin (FARXIGA) 5 MG tablet tablet, Take 5 mg by mouth Daily., Disp: , Rfl:   •  diltiaZEM CD (CARDIZEM CD) 300 MG 24 hr capsule, Take 1 capsule by mouth Daily., Disp: 30 capsule, Rfl: 6  •  esomeprazole  (nexIUM) 40 MG capsule, TAKE 1 CAPSULE BY MOUTH DAILY 30 MINUTES BEFORE BREAKFAST, Disp: 90 capsule, Rfl: 5  •  estradiol (ESTRACE) 1 MG tablet, Take 1 mg by mouth Daily., Disp: , Rfl:   •  furosemide (LASIX) 20 MG tablet, Take 1 tablet by mouth As Needed (LE edema)., Disp: 30 tablet, Rfl: 6  •  hydrochlorothiazide (MICROZIDE) 12.5 MG capsule, Take 12.5 mg by mouth daily., Disp: , Rfl:   •  ipratropium-albuterol (COMBIVENT RESPIMAT)  MCG/ACT inhaler, Inhale 1 puff 6 (six) times a day., Disp: , Rfl:   •  loratadine (CLARITIN) 10 MG tablet, TK 1 T PO QD, Disp: , Rfl: 11  •  lubiprostone (AMITIZA) 24 MCG capsule, Take 1 capsule by mouth 2 (Two) Times a Day With Meals., Disp: 60 capsule, Rfl: 5  •  montelukast (SINGULAIR) 10 MG tablet, TK 1 T PO QD, Disp: , Rfl: 11  •  O2 (OXYGEN), Inhale 2 L/min 1 (one) time, Disp: , Rfl:   •  pravastatin (PRAVACHOL) 80 MG tablet, Take 1 tablet by mouth Every Night., Disp: 30 tablet, Rfl: 6  •  SITagliptin (JANUVIA) 50 MG tablet, Take 50 mg by mouth Daily., Disp: , Rfl:   •  azithromycin (ZITHROMAX Z-ALBINA) 250 MG tablet, Take 2 tablets the first day, then 1 tablet daily for 4 days., Disp: 6 tablet, Rfl: 0  •  benzonatate (TESSALON) 100 MG capsule, Take 1 capsule by mouth 3 (Three) Times a Day As Needed for Cough., Disp: 30 capsule, Rfl: 0  •  medroxyPROGESTERone (PROVERA) 5 MG tablet, Take 5 mg by mouth Daily., Disp: , Rfl:   •  ondansetron (ZOFRAN) 8 MG tablet, Take 1 tablet by mouth every 8 (eight) hours as needed for nausea or vomiting., Disp: 25 tablet, Rfl: 1    The following portions of the patient's history were reviewed and updated as appropriate: allergies, current medications, past family history, past medical history, past social history, past surgical history and problem list.    Review of Systems   Constitution: Negative for chills, diaphoresis and fever.   HENT: Negative for congestion, ear pain and sore throat.    Eyes: Negative for blurred vision, pain and redness.  "  Cardiovascular: Positive for palpitations. Negative for chest pain, leg swelling, orthopnea, paroxysmal nocturnal dyspnea and syncope.   Respiratory: Positive for shortness of breath. Negative for cough and hemoptysis.    Endocrine: Negative for cold intolerance and heat intolerance.   Hematologic/Lymphatic: Does not bruise/bleed easily.   Skin: Negative for rash.   Musculoskeletal: Negative for myalgias.   Gastrointestinal: Negative for abdominal pain, constipation, diarrhea, hematemesis, hematochezia, melena, nausea and vomiting.   Genitourinary: Negative for dysuria and hematuria.   Neurological: Negative for dizziness, focal weakness and numbness.   Psychiatric/Behavioral: Negative for depression. The patient is not nervous/anxious.        Objective   Blood pressure 113/77, pulse 75, resp. rate 16, height 165.1 cm (65\"), weight 94.8 kg (209 lb).    Physical Exam   Constitutional: She is oriented to person, place, and time. She appears well-developed and well-nourished.   WF sitting comfortably on chair.   HENT:   Mouth/Throat: Oropharynx is clear and moist.   Eyes: EOM are normal. Pupils are equal, round, and reactive to light.   Neck: Neck supple. No JVD present. No tracheal deviation present. No thyromegaly present.   Bruit present on the right side.   Cardiovascular: Normal rate, regular rhythm, S1 normal and S2 normal.  Exam reveals no gallop and no friction rub.    No murmur heard.  Pulmonary/Chest: Effort normal. No respiratory distress. She has no wheezes. She has rales.   Abdominal: Soft. Bowel sounds are normal. She exhibits no mass. There is no tenderness.   Musculoskeletal: Normal range of motion. She exhibits no edema.   Lymphadenopathy:     She has no cervical adenopathy.   Neurological: She is alert and oriented to person, place, and time.   Skin: Skin is warm and dry. No rash noted.   Psychiatric: She has a normal mood and affect.       Procedures    Assessment/Plan     1.  Palpitations: The " patient continues to complain of palpitations.  She did not complete the Holter monitor ordered on last visit.  At this point will reorder.    2.  Hypertension: Patient with history of hypertension which remains well controlled on current regimen.  At this point will continue.    3.  Dyslipidemia: Patient with a history of dyslipidemia currently tolerating pravastatin.  We will repeat a lipid panel to evaluate for improvement.     Diagnosis Plan   1. Palpitations  Holter Monitor - 24 Hour   2. Essential hypertension     3. Dyslipidemia  Lipid Panel        Return in about 3 months (around 7/11/2018).    I appreciate the opportunity to participate in this patient's cardiovascular care.    Best Regards    Leonard Olivarez

## 2018-04-16 ENCOUNTER — HOSPITAL ENCOUNTER (OUTPATIENT)
Dept: RESPIRATORY THERAPY | Facility: HOSPITAL | Age: 58
Discharge: HOME | End: 2018-04-16
Attending: INTERNAL MEDICINE | Admitting: INTERNAL MEDICINE

## 2018-04-16 ENCOUNTER — LAB (OUTPATIENT)
Dept: LAB | Facility: HOSPITAL | Age: 58
End: 2018-04-16
Attending: INTERNAL MEDICINE

## 2018-04-16 DIAGNOSIS — E78.5 DYSLIPIDEMIA: ICD-10-CM

## 2018-04-16 DIAGNOSIS — R00.2 PALPITATIONS: ICD-10-CM

## 2018-04-16 LAB
CHOLEST SERPL-MCNC: 189 MG/DL (ref 0–200)
HDLC SERPL-MCNC: 41 MG/DL (ref 60–100)
LDLC SERPL CALC-MCNC: 119 MG/DL (ref 0–100)
LDLC/HDLC SERPL: 2.91 {RATIO}
TRIGL SERPL-MCNC: 144 MG/DL (ref 0–150)
VLDLC SERPL-MCNC: 28.8 MG/DL

## 2018-04-16 PROCEDURE — 80061 LIPID PANEL: CPT

## 2018-04-16 PROCEDURE — 93226 XTRNL ECG REC<48 HR SCAN A/R: CPT

## 2018-04-16 PROCEDURE — 36415 COLL VENOUS BLD VENIPUNCTURE: CPT

## 2018-04-16 PROCEDURE — 93225 XTRNL ECG REC<48 HRS REC: CPT

## 2018-04-18 PROCEDURE — 93227 XTRNL ECG REC<48 HR R&I: CPT | Performed by: INTERNAL MEDICINE

## 2018-04-24 DIAGNOSIS — M25.532 WRIST PAIN, LEFT: Primary | ICD-10-CM

## 2018-04-25 ENCOUNTER — HOSPITAL ENCOUNTER (OUTPATIENT)
Dept: GENERAL RADIOLOGY | Facility: HOSPITAL | Age: 58
Discharge: HOME | End: 2018-04-25
Attending: ORTHOPAEDIC SURGERY | Admitting: ORTHOPAEDIC SURGERY

## 2018-04-25 ENCOUNTER — OFFICE VISIT (OUTPATIENT)
Dept: ORTHOPEDIC SURGERY | Facility: CLINIC | Age: 58
End: 2018-04-25

## 2018-04-25 VITALS — WEIGHT: 209 LBS | BODY MASS INDEX: 34.82 KG/M2 | HEIGHT: 65 IN

## 2018-04-25 DIAGNOSIS — M25.532 WRIST PAIN, LEFT: ICD-10-CM

## 2018-04-25 DIAGNOSIS — M65.4 DE QUERVAIN'S TENOSYNOVITIS, LEFT: Primary | ICD-10-CM

## 2018-04-25 PROCEDURE — 99203 OFFICE O/P NEW LOW 30 MIN: CPT | Performed by: ORTHOPAEDIC SURGERY

## 2018-04-25 PROCEDURE — 73110 X-RAY EXAM OF WRIST: CPT | Performed by: RADIOLOGY

## 2018-04-25 PROCEDURE — 73110 X-RAY EXAM OF WRIST: CPT

## 2018-04-25 RX ORDER — LUBIPROSTONE 24 UG/1
1 CAPSULE ORAL
COMMUNITY
End: 2018-04-25 | Stop reason: SDUPTHER

## 2018-04-25 RX ORDER — ONDANSETRON HYDROCHLORIDE 8 MG/1
TABLET, FILM COATED ORAL DAILY
COMMUNITY
End: 2020-09-21 | Stop reason: SDUPTHER

## 2018-04-25 RX ORDER — BACLOFEN 10 MG/1
TABLET ORAL DAILY
COMMUNITY
End: 2018-04-25 | Stop reason: SDUPTHER

## 2018-04-25 RX ORDER — PRAVASTATIN SODIUM 80 MG/1
TABLET ORAL DAILY
COMMUNITY
End: 2018-04-25 | Stop reason: SDUPTHER

## 2018-04-25 RX ORDER — ESTRADIOL 1 MG/1
TABLET ORAL DAILY
COMMUNITY
End: 2018-04-25 | Stop reason: SDUPTHER

## 2018-04-25 RX ORDER — ERGOCALCIFEROL 1.25 MG/1
5000 CAPSULE ORAL DAILY
COMMUNITY
End: 2021-11-16 | Stop reason: ALTCHOICE

## 2018-04-25 RX ORDER — MONTELUKAST SODIUM 10 MG/1
TABLET ORAL DAILY
COMMUNITY
End: 2018-04-25 | Stop reason: SDUPTHER

## 2018-04-25 RX ORDER — FUROSEMIDE 20 MG/1
TABLET ORAL DAILY
COMMUNITY
End: 2018-08-16

## 2018-04-25 RX ORDER — HYDROCHLOROTHIAZIDE 12.5 MG/1
1 CAPSULE, GELATIN COATED ORAL DAILY
COMMUNITY
Start: 2018-04-09 | End: 2018-04-25 | Stop reason: SDUPTHER

## 2018-04-25 NOTE — PROGRESS NOTES
New Patient Visit        Patient: Em Marrero  YOB: 1960  Date of encounter: 4/25/2018      History of Present Illness:   Em Marrero is a 57 y.o. female who is referred here today by Dr. Connell for evaluation of left wrist pain.  She states it started about 5 weeks ago with a popping sensation with range of motion of the thumb.  She states in the last 3 weeks she has developed significant pain in the base of the thumb that radiates down.  She states it's worse when she lifts the thumb or when she tries to  objects.  She denies paresthesias.  She states she also has known carpal tunnel syndrome and trigger fingering of the third finger but these are not bothering her.    PMH:   Patient Active Problem List   Diagnosis   • Palpitations   • Dyslipidemia   • Hypertension   • Gastroesophageal reflux disease without esophagitis   • Pulmonary emphysema   • Arthritis   • Hives   • Neck pain   • Bruit of right carotid artery   • Precordial pain     Past Medical History:   Diagnosis Date   • Dyslipidemia    • GERD (gastroesophageal reflux disease)    • Hypertension    • Hypokalemia    • Palpitations        PSH:  Past Surgical History:   Procedure Laterality Date   • BLADDER SURGERY     • BREAST BIOPSY Right 8 YRS AGO   • CHOLECYSTECTOMY     • COLONOSCOPY      Negative   • CORONARY ANGIOPLASTY Left 2000   • HYSTERECTOMY         Allergies:     Allergies   Allergen Reactions   • Coreg [Carvedilol] Shortness Of Breath   • Dexamethasone    • Ketorolac    • Ketorolac Tromethamine    • Meloxicam      Blister on lip   • Metoprolol    • Synthroid [Levothyroxine Sodium]      Blister on lip       Medications:     Current Outpatient Prescriptions:   •  ipratropium-albuterol (COMBIVENT RESPIMAT)  MCG/ACT inhaler, Inhale 1 puff., Disp: , Rfl:   •  acetaminophen (TYLENOL) 500 MG tablet, Take 500 mg by mouth Every 6 (Six) Hours As Needed for Mild Pain ., Disp: , Rfl:   •  ALLERGY SERUM INJECTION, Inject  under the  skin 1 (One) Time., Disp: , Rfl:   •  amitriptyline (ELAVIL) 10 MG tablet, Take 10 mg by mouth every night., Disp: , Rfl:   •  ANORO ELLIPTA 62.5-25 MCG/INH aerosol powder , INL 1 PUFF PO D, Disp: , Rfl: 2  •  azithromycin (ZITHROMAX Z-ALBINA) 250 MG tablet, Take 2 tablets the first day, then 1 tablet daily for 4 days., Disp: 6 tablet, Rfl: 0  •  baclofen (LIORESAL) 10 MG tablet, Take 10 mg by mouth 2 (Two) Times a Day. 2 tabs of the night., Disp: , Rfl:   •  benzonatate (TESSALON) 100 MG capsule, Take 1 capsule by mouth 3 (Three) Times a Day As Needed for Cough., Disp: 30 capsule, Rfl: 0  •  dapagliflozin (FARXIGA) 5 MG tablet tablet, Take 5 mg by mouth Daily., Disp: , Rfl:   •  diltiaZEM CD (CARDIZEM CD) 300 MG 24 hr capsule, Take 1 capsule by mouth Daily., Disp: 30 capsule, Rfl: 6  •  esomeprazole (nexIUM) 40 MG capsule, TAKE 1 CAPSULE BY MOUTH DAILY 30 MINUTES BEFORE BREAKFAST, Disp: 90 capsule, Rfl: 5  •  estradiol (ESTRACE) 1 MG tablet, Take 1 mg by mouth Daily., Disp: , Rfl:   •  furosemide (LASIX) 20 MG tablet, Take 1 tablet by mouth As Needed (LE edema)., Disp: 30 tablet, Rfl: 6  •  furosemide (LASIX) 20 MG tablet, Take  by mouth Daily., Disp: , Rfl:   •  hydrochlorothiazide (MICROZIDE) 12.5 MG capsule, Take 12.5 mg by mouth daily., Disp: , Rfl:   •  ipratropium-albuterol (COMBIVENT RESPIMAT)  MCG/ACT inhaler, Inhale 1 puff 6 (six) times a day., Disp: , Rfl:   •  loratadine (CLARITIN) 10 MG tablet, TK 1 T PO QD, Disp: , Rfl: 11  •  lubiprostone (AMITIZA) 24 MCG capsule, Take 1 capsule by mouth 2 (Two) Times a Day With Meals., Disp: 60 capsule, Rfl: 5  •  medroxyPROGESTERone (PROVERA) 5 MG tablet, Take 5 mg by mouth Daily., Disp: , Rfl:   •  montelukast (SINGULAIR) 10 MG tablet, TK 1 T PO QD, Disp: , Rfl: 11  •  O2 (OXYGEN), Inhale 2 L/min 1 (one) time, Disp: , Rfl:   •  ondansetron (ZOFRAN) 8 MG tablet, Take 1 tablet by mouth every 8 (eight) hours as needed for nausea or vomiting., Disp: 25 tablet, Rfl:  1  •  ondansetron (ZOFRAN) 8 MG tablet, Take  by mouth Daily., Disp: , Rfl:   •  pravastatin (PRAVACHOL) 80 MG tablet, Take 1 tablet by mouth Every Night., Disp: 30 tablet, Rfl: 6  •  SITagliptin (JANUVIA) 50 MG tablet, Take 50 mg by mouth Daily., Disp: , Rfl:   •  vitamin D (ERGOCALCIFEROL) 82671 units capsule capsule, Take 1 capsule by mouth Daily., Disp: , Rfl:     Social History:  Social History     Social History   • Marital status: Legally      Spouse name: N/A   • Number of children: N/A   • Years of education: N/A     Occupational History   • Not on file.     Social History Main Topics   • Smoking status: Current Every Day Smoker     Types: Electronic Cigarette   • Smokeless tobacco: Never Used   • Alcohol use No   • Drug use: No   • Sexual activity: Not on file     Other Topics Concern   • Not on file     Social History Narrative   • No narrative on file       Family History:     Family History   Problem Relation Age of Onset   • Breast cancer Sister    • Coronary artery disease Sister 50   • Osteoarthritis Sister    • Osteoporosis Sister    • Rheum arthritis Sister    • Cancer Sister    • Diabetes Sister    • Hypertension Sister    • Osteoarthritis Mother    • Osteoarthritis Father    • Heart disease Father    • Hypertension Father    • Osteoarthritis Brother    • Gout Brother    • Hypertension Brother        Review of Systems:   Review of Systems   Constitutional: Positive for fatigue.   HENT: Positive for rhinorrhea, sinus pain and sinus pressure.    Eyes: Negative.    Respiratory: Negative.    Cardiovascular: Positive for palpitations and leg swelling.   Gastrointestinal: Positive for abdominal distention, nausea and vomiting.   Endocrine: Negative.    Genitourinary: Negative.    Musculoskeletal: Positive for arthralgias, back pain and joint swelling.   Skin: Negative.    Neurological: Negative.    Hematological: Negative.    Psychiatric/Behavioral: Negative.        Physical Exam: 57 y.o.  "female  General Appearance:    Alert and oriented x 3, cooperative, in no acute distress                   Vitals:    04/25/18 1001   Weight: 94.8 kg (209 lb)   Height: 165.1 cm (65\")              Body mass index is 34.78 kg/m².        Musculoskeletal: Examination the left wrist reveals moderate tenderness along the first extensor compartment.  She has normal range of motion of the thumb and wrist.  She is positive Finkelstein's test.  Neurovascular status is intact.    Radiology:     3 views of the left wrist were reviewed revealing arthritic changes within the wrist.  There is no acute fractures or dislocations.    Assessment    ICD-10-CM ICD-9-CM   1. De Quervain's tenosynovitis, left M65.4 727.04       Plan:   A 57-year-old female with clinical evidence for de Quervain's tenosynovitis of the left wrist.  Today we discussed options including surgical intervention with de Quervain's release, injections into the first extensor compartment or immobilization with braces.  After a lengthy discussion she's decided to hold off on any surgery or injections.  She would like to try the brace first to see if this will improve his symptoms.  Today we provided her with a thumb spica brace.  She is doing with activities of the next several weeks to see if this will ease her pain.  If symptoms persist or worsen she can return back to the clinic for possible injection.    Written by, Sammie CORDOBA, acting as a scribe for Dr. Willis  "

## 2018-07-09 ENCOUNTER — HOSPITAL ENCOUNTER (OUTPATIENT)
Dept: MAMMOGRAPHY | Facility: HOSPITAL | Age: 58
Discharge: HOME | End: 2018-07-09
Admitting: NURSE PRACTITIONER

## 2018-07-09 DIAGNOSIS — Z12.39 SCREENING BREAST EXAMINATION: ICD-10-CM

## 2018-07-09 PROCEDURE — 77063 BREAST TOMOSYNTHESIS BI: CPT

## 2018-07-09 PROCEDURE — 77067 SCR MAMMO BI INCL CAD: CPT | Performed by: RADIOLOGY

## 2018-07-09 PROCEDURE — 77067 SCR MAMMO BI INCL CAD: CPT

## 2018-07-09 PROCEDURE — 77063 BREAST TOMOSYNTHESIS BI: CPT | Performed by: RADIOLOGY

## 2018-07-10 ENCOUNTER — OFFICE VISIT (OUTPATIENT)
Dept: CARDIOLOGY | Facility: CLINIC | Age: 58
End: 2018-07-10

## 2018-07-10 VITALS
BODY MASS INDEX: 34.05 KG/M2 | HEART RATE: 85 BPM | SYSTOLIC BLOOD PRESSURE: 128 MMHG | DIASTOLIC BLOOD PRESSURE: 80 MMHG | HEIGHT: 65 IN | RESPIRATION RATE: 16 BRPM | WEIGHT: 204.4 LBS

## 2018-07-10 DIAGNOSIS — E78.5 DYSLIPIDEMIA: ICD-10-CM

## 2018-07-10 DIAGNOSIS — R00.2 PALPITATIONS: Primary | ICD-10-CM

## 2018-07-10 DIAGNOSIS — I10 ESSENTIAL HYPERTENSION: ICD-10-CM

## 2018-07-10 PROCEDURE — 99213 OFFICE O/P EST LOW 20 MIN: CPT | Performed by: INTERNAL MEDICINE

## 2018-07-10 RX ORDER — ATORVASTATIN CALCIUM 40 MG/1
40 TABLET, FILM COATED ORAL DAILY
Qty: 30 TABLET | Refills: 4 | Status: SHIPPED | OUTPATIENT
Start: 2018-07-10 | End: 2019-04-29

## 2018-07-10 RX ORDER — DILTIAZEM HYDROCHLORIDE 360 MG/1
360 CAPSULE, EXTENDED RELEASE ORAL DAILY
Qty: 30 CAPSULE | Refills: 6 | Status: SHIPPED | OUTPATIENT
Start: 2018-07-10 | End: 2018-07-10 | Stop reason: SDUPTHER

## 2018-07-10 RX ORDER — DILTIAZEM HYDROCHLORIDE 360 MG/1
360 CAPSULE, EXTENDED RELEASE ORAL DAILY
Qty: 30 CAPSULE | Refills: 4 | Status: SHIPPED | OUTPATIENT
Start: 2018-07-10 | End: 2018-08-08 | Stop reason: SDUPTHER

## 2018-07-10 RX ORDER — ATORVASTATIN CALCIUM 40 MG/1
40 TABLET, FILM COATED ORAL DAILY
Qty: 30 TABLET | Refills: 11 | Status: SHIPPED | OUTPATIENT
Start: 2018-07-10 | End: 2018-07-10 | Stop reason: SDUPTHER

## 2018-07-10 NOTE — PROGRESS NOTES
Juan C Connell MD  Em Marrero  1960      Patient Active Problem List   Diagnosis   • Palpitations   • Dyslipidemia   • Hypertension   • Gastroesophageal reflux disease without esophagitis   • Pulmonary emphysema (CMS/HCC)   • Arthritis   • Hives   • Neck pain   • Bruit of right carotid artery   • Precordial pain       Dear Juan C Connell MD:    Subjective     Em Marrero is a 57 y.o. female with the above medical problems who is here today for follow-up.  According to the patient denies to complain of occasional palpitations since her last visit.  Denies any exacerbating or ameliorating factors.  She denies any associated symptoms.  She states she feels her heart racing for a few minutes after which the palpitations resolved.  She states intensity is mild.   She underwent a Holter monitor which showed no evidence of significant arrhythmias.  She does have a history of dyslipidemia which does not appear to be well-controlled on current regimen. She denies any chest pain, shortness of breath, orthopnea, PND, lower extremity edema, dizziness or syncope.            Current Outpatient Prescriptions:   •  acetaminophen (TYLENOL) 500 MG tablet, Take 500 mg by mouth Every 6 (Six) Hours As Needed for Mild Pain ., Disp: , Rfl:   •  ALLERGY SERUM INJECTION, Inject  under the skin 1 (One) Time., Disp: , Rfl:   •  amitriptyline (ELAVIL) 10 MG tablet, Take 10 mg by mouth every night., Disp: , Rfl:   •  ANORO ELLIPTA 62.5-25 MCG/INH aerosol powder , INL 1 PUFF PO D, Disp: , Rfl: 2  •  azithromycin (ZITHROMAX Z-ALBINA) 250 MG tablet, Take 2 tablets the first day, then 1 tablet daily for 4 days., Disp: 6 tablet, Rfl: 0  •  baclofen (LIORESAL) 10 MG tablet, Take 10 mg by mouth 2 (Two) Times a Day. 2 tabs of the night., Disp: , Rfl:   •  benzonatate (TESSALON) 100 MG capsule, Take 1 capsule by mouth 3 (Three) Times a Day As Needed for Cough., Disp: 30 capsule, Rfl: 0  •  dapagliflozin (FARXIGA) 5 MG tablet tablet, Take 5 mg by  mouth Daily., Disp: , Rfl:   •  diltiaZEM CD (CARDIZEM CD) 360 MG 24 hr capsule, Take 1 capsule by mouth Daily., Disp: 30 capsule, Rfl: 6  •  esomeprazole (nexIUM) 40 MG capsule, TAKE 1 CAPSULE BY MOUTH DAILY 30 MINUTES BEFORE BREAKFAST, Disp: 90 capsule, Rfl: 5  •  estradiol (ESTRACE) 1 MG tablet, Take 1 mg by mouth Daily., Disp: , Rfl:   •  furosemide (LASIX) 20 MG tablet, Take 1 tablet by mouth As Needed (LE edema)., Disp: 30 tablet, Rfl: 6  •  furosemide (LASIX) 20 MG tablet, Take  by mouth Daily., Disp: , Rfl:   •  hydrochlorothiazide (MICROZIDE) 12.5 MG capsule, Take 12.5 mg by mouth daily., Disp: , Rfl:   •  ipratropium-albuterol (COMBIVENT RESPIMAT)  MCG/ACT inhaler, Inhale 1 puff 6 (six) times a day., Disp: , Rfl:   •  ipratropium-albuterol (COMBIVENT RESPIMAT)  MCG/ACT inhaler, Inhale 1 puff., Disp: , Rfl:   •  loratadine (CLARITIN) 10 MG tablet, TK 1 T PO QD, Disp: , Rfl: 11  •  lubiprostone (AMITIZA) 24 MCG capsule, Take 1 capsule by mouth 2 (Two) Times a Day With Meals., Disp: 60 capsule, Rfl: 5  •  medroxyPROGESTERone (PROVERA) 5 MG tablet, Take 5 mg by mouth Daily., Disp: , Rfl:   •  montelukast (SINGULAIR) 10 MG tablet, TK 1 T PO QD, Disp: , Rfl: 11  •  O2 (OXYGEN), Inhale 2 L/min 1 (one) time, Disp: , Rfl:   •  ondansetron (ZOFRAN) 8 MG tablet, Take 1 tablet by mouth every 8 (eight) hours as needed for nausea or vomiting., Disp: 25 tablet, Rfl: 1  •  ondansetron (ZOFRAN) 8 MG tablet, Take  by mouth Daily., Disp: , Rfl:   •  SITagliptin (JANUVIA) 50 MG tablet, Take 50 mg by mouth Daily., Disp: , Rfl:   •  vitamin D (ERGOCALCIFEROL) 16866 units capsule capsule, Take 1 capsule by mouth Daily., Disp: , Rfl:   •  atorvastatin (LIPITOR) 40 MG tablet, Take 1 tablet by mouth Daily., Disp: 30 tablet, Rfl: 11    The following portions of the patient's history were reviewed and updated as appropriate: allergies, current medications, past family history, past medical history, past social history,  "past surgical history and problem list.    Review of Systems   Constitution: Negative for chills, diaphoresis and fever.   HENT: Negative for congestion, ear pain and sore throat.    Eyes: Negative for blurred vision, pain and redness.   Cardiovascular: Negative for chest pain, leg swelling, orthopnea, palpitations, paroxysmal nocturnal dyspnea and syncope.   Respiratory: Negative for cough, hemoptysis and shortness of breath.    Endocrine: Negative for cold intolerance and heat intolerance.   Hematologic/Lymphatic: Does not bruise/bleed easily.   Skin: Negative for rash.   Musculoskeletal: Positive for arthritis, joint pain, joint swelling and stiffness. Negative for myalgias.   Gastrointestinal: Negative for abdominal pain, constipation, diarrhea, hematemesis, hematochezia, melena, nausea and vomiting.   Genitourinary: Negative for dysuria and hematuria.   Neurological: Negative for dizziness, focal weakness and numbness.   Psychiatric/Behavioral: Negative for depression. The patient is not nervous/anxious.        Objective   Blood pressure 128/80, pulse 85, resp. rate 16, height 165.1 cm (65\"), weight 92.7 kg (204 lb 6.4 oz).    Physical Exam   Constitutional: She is oriented to person, place, and time. She appears well-developed and well-nourished.   WF sitting comfortably on chair.   HENT:   Mouth/Throat: Oropharynx is clear and moist.   Eyes: EOM are normal. Pupils are equal, round, and reactive to light.   Neck: Neck supple. No JVD present. No tracheal deviation present. No thyromegaly present.   Cardiovascular: Normal rate, regular rhythm, S1 normal and S2 normal.  Exam reveals no gallop and no friction rub.    No murmur heard.  Pulmonary/Chest: Effort normal. No respiratory distress. She has no wheezes. She has no rales.   Decrease breath sounds in both lung fields.   Abdominal: Soft. Bowel sounds are normal. She exhibits no mass. There is no tenderness.   Musculoskeletal: Normal range of motion. She " exhibits no edema.   Lymphadenopathy:     She has no cervical adenopathy.   Neurological: She is alert and oriented to person, place, and time.   Skin: Skin is warm and dry. No rash noted.   Psychiatric: She has a normal mood and affect.       Procedures    Assessment/Plan     1.  Palpitations: According to the patient even though the palpitations are improved but continued to be present.  At this point will increase her diltiazem to 360 mg by mouth daily and monitor for improvement.    2.  Hypertension: Patient with history of hypertension which remains well controlled on current regimen.  At this point will continue.    3.  Dyslipidemia: The patient the went a lipid panel which shows poor control at this point.  Have to switch over to atorvastatin 40 mg by mouth daily.  Will need to recheck on next visit.     Diagnosis Plan   1. Palpitations     2. Essential hypertension     3. Dyslipidemia          Return in about 3 months (around 10/10/2018).    I appreciate the opportunity to participate in this patient's cardiovascular care.    Best Regards    Leonard Olivarez

## 2018-07-19 ENCOUNTER — HOSPITAL ENCOUNTER (OUTPATIENT)
Dept: ULTRASOUND IMAGING | Facility: HOSPITAL | Age: 58
Discharge: HOME | End: 2018-07-19
Admitting: RADIOLOGY

## 2018-07-19 DIAGNOSIS — R92.8 ABNORMAL MAMMOGRAM: ICD-10-CM

## 2018-07-19 PROCEDURE — 76882 US LMTD JT/FCL EVL NVASC XTR: CPT

## 2018-07-19 PROCEDURE — 76882 US LMTD JT/FCL EVL NVASC XTR: CPT | Performed by: RADIOLOGY

## 2018-08-08 ENCOUNTER — TELEPHONE (OUTPATIENT)
Dept: CARDIOLOGY | Facility: CLINIC | Age: 58
End: 2018-08-08

## 2018-08-08 RX ORDER — DILTIAZEM HYDROCHLORIDE 300 MG/1
300 CAPSULE, COATED, EXTENDED RELEASE ORAL DAILY
Qty: 30 CAPSULE | Refills: 6 | Status: SHIPPED | OUTPATIENT
Start: 2018-08-08 | End: 2019-03-21 | Stop reason: SDUPTHER

## 2018-08-08 NOTE — TELEPHONE ENCOUNTER
PLEASE CALL PATIENT WHO IS HAVING A QUESTION REGARDING HER MEDICATION.  SHE STATES HER BLOOD PRESSURE SEEMS TO BE LOW.

## 2018-08-08 NOTE — TELEPHONE ENCOUNTER
Vital signs are within normal limits but if she is not feeling well we will decrease diltiazem back to 300 mg by mouth daily and monitor.

## 2018-08-08 NOTE — TELEPHONE ENCOUNTER
Called pt and she stated that she hasn't been feeling good since her Diltiazem was increased to 360 mg on 7/10/18. Her BP has been 100/54 HR 61

## 2018-08-14 ENCOUNTER — HOSPITAL ENCOUNTER (OUTPATIENT)
Dept: ULTRASOUND IMAGING | Facility: HOSPITAL | Age: 58
Discharge: HOME | End: 2018-08-14
Admitting: NURSE PRACTITIONER

## 2018-08-14 DIAGNOSIS — N63.13 BREAST LUMP ON RIGHT SIDE AT 7 O'CLOCK POSITION: ICD-10-CM

## 2018-08-14 PROCEDURE — 76642 ULTRASOUND BREAST LIMITED: CPT

## 2018-08-14 PROCEDURE — 76642 ULTRASOUND BREAST LIMITED: CPT | Performed by: RADIOLOGY

## 2018-08-14 NOTE — TELEPHONE ENCOUNTER
Called pt and advised her. She stated that she had already went back to the 300 mg and she is feeling a lot better.

## 2018-08-16 ENCOUNTER — APPOINTMENT (OUTPATIENT)
Dept: GENERAL RADIOLOGY | Facility: HOSPITAL | Age: 58
End: 2018-08-16

## 2018-08-16 ENCOUNTER — HOSPITAL ENCOUNTER (EMERGENCY)
Facility: HOSPITAL | Age: 58
Discharge: HOME | End: 2018-08-16
Attending: EMERGENCY MEDICINE | Admitting: EMERGENCY MEDICINE

## 2018-08-16 VITALS
WEIGHT: 199 LBS | RESPIRATION RATE: 16 BRPM | HEIGHT: 65 IN | BODY MASS INDEX: 33.15 KG/M2 | TEMPERATURE: 96.7 F | HEART RATE: 70 BPM | SYSTOLIC BLOOD PRESSURE: 114 MMHG | OXYGEN SATURATION: 95 % | DIASTOLIC BLOOD PRESSURE: 71 MMHG

## 2018-08-16 DIAGNOSIS — M79.671 RIGHT FOOT PAIN: Primary | ICD-10-CM

## 2018-08-16 LAB
ALBUMIN SERPL-MCNC: 3.8 G/DL (ref 3.5–5)
ALBUMIN/GLOB SERPL: 1.3 G/DL (ref 1.5–2.5)
ALP SERPL-CCNC: 84 U/L (ref 35–104)
ALT SERPL W P-5'-P-CCNC: 25 U/L (ref 10–36)
ANION GAP SERPL CALCULATED.3IONS-SCNC: 6.2 MMOL/L (ref 3.6–11.2)
AST SERPL-CCNC: 24 U/L (ref 10–30)
BASOPHILS # BLD AUTO: 0.04 10*3/MM3 (ref 0–0.3)
BASOPHILS NFR BLD AUTO: 0.4 % (ref 0–2)
BILIRUB SERPL-MCNC: 0.2 MG/DL (ref 0.2–1.8)
BUN BLD-MCNC: 13 MG/DL (ref 7–21)
BUN/CREAT SERPL: 12.9 (ref 7–25)
CALCIUM SPEC-SCNC: 9.1 MG/DL (ref 7.7–10)
CHLORIDE SERPL-SCNC: 109 MMOL/L (ref 99–112)
CO2 SERPL-SCNC: 22.8 MMOL/L (ref 24.3–31.9)
CREAT BLD-MCNC: 1.01 MG/DL (ref 0.43–1.29)
DEPRECATED RDW RBC AUTO: 40.4 FL (ref 37–54)
EOSINOPHIL # BLD AUTO: 0.4 10*3/MM3 (ref 0–0.7)
EOSINOPHIL NFR BLD AUTO: 4.1 % (ref 0–5)
ERYTHROCYTE [DISTWIDTH] IN BLOOD BY AUTOMATED COUNT: 13.7 % (ref 11.5–14.5)
ERYTHROCYTE [SEDIMENTATION RATE] IN BLOOD: 9 MM/HR (ref 0–30)
GFR SERPL CREATININE-BSD FRML MDRD: 56 ML/MIN/1.73
GLOBULIN UR ELPH-MCNC: 2.9 GM/DL
GLUCOSE BLD-MCNC: 110 MG/DL (ref 70–110)
HCT VFR BLD AUTO: 43.2 % (ref 37–47)
HGB BLD-MCNC: 14.5 G/DL (ref 12–16)
IMM GRANULOCYTES # BLD: 0.02 10*3/MM3 (ref 0–0.03)
IMM GRANULOCYTES NFR BLD: 0.2 % (ref 0–0.5)
LYMPHOCYTES # BLD AUTO: 2.24 10*3/MM3 (ref 1–3)
LYMPHOCYTES NFR BLD AUTO: 23.1 % (ref 21–51)
MCH RBC QN AUTO: 27.5 PG (ref 27–33)
MCHC RBC AUTO-ENTMCNC: 33.6 G/DL (ref 33–37)
MCV RBC AUTO: 81.8 FL (ref 80–94)
MONOCYTES # BLD AUTO: 0.64 10*3/MM3 (ref 0.1–0.9)
MONOCYTES NFR BLD AUTO: 6.6 % (ref 0–10)
NEUTROPHILS # BLD AUTO: 6.34 10*3/MM3 (ref 1.4–6.5)
NEUTROPHILS NFR BLD AUTO: 65.6 % (ref 30–70)
OSMOLALITY SERPL CALC.SUM OF ELEC: 276.4 MOSM/KG (ref 273–305)
PLATELET # BLD AUTO: 246 10*3/MM3 (ref 130–400)
PMV BLD AUTO: 10.7 FL (ref 6–10)
POTASSIUM BLD-SCNC: 4 MMOL/L (ref 3.5–5.3)
PROT SERPL-MCNC: 6.7 G/DL (ref 6–8)
RBC # BLD AUTO: 5.28 10*6/MM3 (ref 4.2–5.4)
SODIUM BLD-SCNC: 138 MMOL/L (ref 135–153)
WBC NRBC COR # BLD: 9.68 10*3/MM3 (ref 4.5–12.5)

## 2018-08-16 PROCEDURE — 85025 COMPLETE CBC W/AUTO DIFF WBC: CPT | Performed by: PHYSICIAN ASSISTANT

## 2018-08-16 PROCEDURE — 85652 RBC SED RATE AUTOMATED: CPT | Performed by: PHYSICIAN ASSISTANT

## 2018-08-16 PROCEDURE — 99283 EMERGENCY DEPT VISIT LOW MDM: CPT

## 2018-08-16 PROCEDURE — 73630 X-RAY EXAM OF FOOT: CPT

## 2018-08-16 PROCEDURE — 73630 X-RAY EXAM OF FOOT: CPT | Performed by: RADIOLOGY

## 2018-08-16 PROCEDURE — 36415 COLL VENOUS BLD VENIPUNCTURE: CPT

## 2018-08-16 PROCEDURE — 80053 COMPREHEN METABOLIC PANEL: CPT | Performed by: PHYSICIAN ASSISTANT

## 2018-08-16 NOTE — ED PROVIDER NOTES
Subjective   57-year-old white female who presents secondary to right foot pain.  She states this started yesterday.  No injury.  No redness.  She states that previously it was swollen though has improved.  She does have pain with bearing weight.  She wears flip flops continually.  She states that she has barefoot frequently in the garden.  No history of gout.  No history of inflammatory arthritis.  No fever            Review of Systems   Constitutional: Negative.  Negative for fever.   HENT: Negative.    Respiratory: Negative.    Cardiovascular: Negative.  Negative for chest pain.   Gastrointestinal: Negative.  Negative for abdominal pain.   Endocrine: Negative.    Genitourinary: Negative.  Negative for dysuria.   Skin: Negative.    Neurological: Negative.    Psychiatric/Behavioral: Negative.    All other systems reviewed and are negative.      Past Medical History:   Diagnosis Date   • Dyslipidemia    • GERD (gastroesophageal reflux disease)    • Hypertension    • Hypokalemia    • Palpitations        Allergies   Allergen Reactions   • Coreg [Carvedilol] Shortness Of Breath   • Meloxicam Other (See Comments)     Blister on lip   • Metoprolol Hives   • Dexamethasone Rash   • Ketorolac Rash   • Ketorolac Tromethamine Rash   • Synthroid [Levothyroxine Sodium] Other (See Comments)     Blister on lip       Past Surgical History:   Procedure Laterality Date   • BLADDER SURGERY     • BREAST BIOPSY Right 8 YRS AGO   • CHOLECYSTECTOMY     • COLONOSCOPY      Negative   • CORONARY ANGIOPLASTY Left 2000   • HYSTERECTOMY         Family History   Problem Relation Age of Onset   • Breast cancer Sister 66   • Coronary artery disease Sister 50   • Osteoarthritis Sister    • Osteoporosis Sister    • Rheum arthritis Sister    • Cancer Sister    • Diabetes Sister    • Hypertension Sister    • Osteoarthritis Mother    • Osteoarthritis Father    • Heart disease Father    • Hypertension Father    • Osteoarthritis Brother    • Gout  Brother    • Hypertension Brother        Social History     Social History   • Marital status: Legally      Social History Main Topics   • Smoking status: Current Every Day Smoker     Types: Electronic Cigarette   • Smokeless tobacco: Never Used   • Alcohol use No   • Drug use: No     Other Topics Concern   • Not on file           Objective   Physical Exam   Constitutional: She is oriented to person, place, and time. She appears well-developed and well-nourished. No distress.   HENT:   Head: Normocephalic and atraumatic.   Right Ear: External ear normal.   Left Ear: External ear normal.   Nose: Nose normal.   Eyes: Pupils are equal, round, and reactive to light. Conjunctivae and EOM are normal.   Neck: Normal range of motion. Neck supple. No JVD present. No tracheal deviation present.   Cardiovascular: Normal rate, regular rhythm and normal heart sounds.    No murmur heard.  Pulmonary/Chest: Effort normal and breath sounds normal. No respiratory distress. She has no wheezes.   Abdominal: Soft. Bowel sounds are normal. There is no tenderness.   Musculoskeletal: Normal range of motion. She exhibits no edema or deformity.   Patient is tender on the dorsal aspect of her right foot along the midfoot.  There is no redness.  There is no warmth.  Currently there is no swelling.  She has normal pedal pulses and normal sensation.  Her surface reveals no evidence of puncture wound or infection.  No sign of deep space infection.   Neurological: She is alert and oriented to person, place, and time. No cranial nerve deficit.   Skin: Skin is warm and dry. No rash noted. She is not diaphoretic. No erythema. No pallor.   Psychiatric: She has a normal mood and affect. Her behavior is normal. Thought content normal.   Nursing note and vitals reviewed.      Procedures           ED Course                  MDM  Number of Diagnoses or Management Options  Right foot pain: new and requires workup     Amount and/or Complexity of  Data Reviewed  Clinical lab tests: reviewed and ordered  Tests in the radiology section of CPT®: ordered and reviewed  Discuss the patient with other providers: yes  Independent visualization of images, tracings, or specimens: yes    Risk of Complications, Morbidity, and/or Mortality  Presenting problems: moderate          Final diagnoses:   Right foot pain            Jose J Stoner PA  08/16/18 1559

## 2018-10-19 ENCOUNTER — OFFICE VISIT (OUTPATIENT)
Dept: CARDIOLOGY | Facility: CLINIC | Age: 58
End: 2018-10-19

## 2018-10-19 ENCOUNTER — TELEPHONE (OUTPATIENT)
Dept: CARDIOLOGY | Facility: CLINIC | Age: 58
End: 2018-10-19

## 2018-10-19 VITALS
DIASTOLIC BLOOD PRESSURE: 74 MMHG | HEIGHT: 65 IN | BODY MASS INDEX: 34.52 KG/M2 | SYSTOLIC BLOOD PRESSURE: 111 MMHG | OXYGEN SATURATION: 97 % | WEIGHT: 207.2 LBS | HEART RATE: 75 BPM

## 2018-10-19 DIAGNOSIS — R09.89 BRUIT OF RIGHT CAROTID ARTERY: ICD-10-CM

## 2018-10-19 DIAGNOSIS — I10 ESSENTIAL HYPERTENSION: Primary | ICD-10-CM

## 2018-10-19 DIAGNOSIS — E78.5 DYSLIPIDEMIA: ICD-10-CM

## 2018-10-19 DIAGNOSIS — R00.2 PALPITATIONS: ICD-10-CM

## 2018-10-19 PROCEDURE — 93000 ELECTROCARDIOGRAM COMPLETE: CPT | Performed by: PHYSICIAN ASSISTANT

## 2018-10-19 PROCEDURE — 99213 OFFICE O/P EST LOW 20 MIN: CPT | Performed by: PHYSICIAN ASSISTANT

## 2018-10-19 NOTE — PATIENT INSTRUCTIONS
BMI for Adults  Body mass index (BMI) is a number that is calculated from a person's weight and height. In most adults, the number is used to find how much of an adult's weight is made up of fat. BMI is not as accurate as a direct measure of body fat.  HOW IS BMI CALCULATED?  BMI is calculated by dividing weight in kilograms by height in meters squared. It can also be calculated by dividing weight in pounds by height in inches squared, then multiplying the resulting number by 703. Charts are available to help you find your BMI quickly and easily without doing this calculation.   HOW IS BMI INTERPRETED?  Health care professionals use BMI charts to identify whether an adult is underweight, at a normal weight, or overweight based on the following guidelines:  · Underweight: BMI less than 18.5.  · Normal weight: BMI between 18.5 and 24.9.  · Overweight: BMI between 25 and 29.9.  · Obese: BMI of 30 and above.  BMI is usually interpreted the same for males and females.  Weight includes both fat and muscle, so someone with a muscular build, such as an athlete, may have a BMI that is higher than 24.9. In cases like these, BMI may not accurately depict body fat. To determine if excess body fat is the cause of a BMI of 25 or higher, further assessments may need to be done by a health care provider.  WHY IS BMI A USEFUL TOOL?  BMI is used to identify a possible weight problem that may be related to a medical problem or may increase the risk for medical problems. BMI can also be used to promote changes to reach a healthy weight.     This information is not intended to replace advice given to you by your health care provider. Make sure you discuss any questions you have with your health care provider.     Document Released: 08/29/2005 Document Revised: 01/08/2016 Document Reviewed: 05/15/2015  [employer] Patient Education ©2017 [employer]       Calorie Counting for Weight Loss  Calories are energy you get from the  things you eat and drink. Your body uses this energy to keep you going throughout the day. The number of calories you eat affects your weight. When you eat more calories than your body needs, your body stores the extra calories as fat. When you eat fewer calories than your body needs, your body burns fat to get the energy it needs.  Calorie counting means keeping track of how many calories you eat and drink each day. If you make sure to eat fewer calories than your body needs, you should lose weight. In order for calorie counting to work, you will need to eat the number of calories that are right for you in a day to lose a healthy amount of weight per week. A healthy amount of weight to lose per week is usually 1-2 lb (0.5-0.9 kg). A dietitian can determine how many calories you need in a day and give you suggestions on how to reach your calorie goal.   WHAT IS MY MY PLAN?  My goal is to have __________ calories per day.   If I have this many calories per day, I should lose around __________ pounds per week.  WHAT DO I NEED TO KNOW ABOUT CALORIE COUNTING?  In order to meet your daily calorie goal, you will need to:  · Find out how many calories are in each food you would like to eat. Try to do this before you eat.  · Decide how much of the food you can eat.  · Write down what you ate and how many calories it had. Doing this is called keeping a food log.  WHERE DO I FIND CALORIE INFORMATION?  The number of calories in a food can be found on a Nutrition Facts label. Note that all the information on a label is based on a specific serving of the food. If a food does not have a Nutrition Facts label, try to look up the calories online or ask your dietitian for help.  HOW DO I DECIDE HOW MUCH TO EAT?  To decide how much of the food you can eat, you will need to consider both the number of calories in one serving and the size of one serving. This information can be found on the Nutrition Facts label. If a food does not  have a Nutrition Facts label, look up the information online or ask your dietitian for help.  Remember that calories are listed per serving. If you choose to have more than one serving of a food, you will have to multiply the calories per serving by the amount of servings you plan to eat. For example, the label on a package of bread might say that a serving size is 1 slice and that there are 90 calories in a serving. If you eat 1 slice, you will have eaten 90 calories. If you eat 2 slices, you will have eaten 180 calories.  HOW DO I KEEP A FOOD LOG?  After each meal, record the following information in your food log:  · What you ate.  · How much of it you ate.  · How many calories it had.  · Then, add up your calories.  Keep your food log near you, such as in a small notebook in your pocket. Another option is to use a mobile sophy or website. Some programs will calculate calories for you and show you how many calories you have left each time you add an item to the log.  WHAT ARE SOME CALORIE COUNTING TIPS?  · Use your calories on foods and drinks that will fill you up and not leave you hungry. Some examples of this include foods like nuts and nut butters, vegetables, lean proteins, and high-fiber foods (more than 5 g fiber per serving).  · Eat nutritious foods and avoid empty calories. Empty calories are calories you get from foods or beverages that do not have many nutrients, such as candy and soda. It is better to have a nutritious high-calorie food (such as an avocado) than a food with few nutrients (such as a bag of chips).  · Know how many calories are in the foods you eat most often. This way, you do not have to look up how many calories they have each time you eat them.  · Look out for foods that may seem like low-calorie foods but are really high-calorie foods, such as baked goods, soda, and fat-free candy.  · Pay attention to calories in drinks. Drinks such as sodas, specialty coffee drinks, alcohol, and  juices have a lot of calories yet do not fill you up. Choose low-calorie drinks like water and diet drinks.  · Focus your calorie counting efforts on higher calorie items. Logging the calories in a garden salad that contains only vegetables is less important than calculating the calories in a milk shake.  · Find a way of tracking calories that works for you. Get creative. Most people who are successful find ways to keep track of how much they eat in a day, even if they do not count every calorie.  WHAT ARE SOME PORTION CONTROL TIPS?  · Know how many calories are in a serving. This will help you know how many servings of a certain food you can have.  · Use a measuring cup to measure serving sizes. This is helpful when you start out. With time, you will be able to estimate serving sizes for some foods.  · Take some time to put servings of different foods on your favorite plates, bowls, and cups so you know what a serving looks like.  · Try not to eat straight from a bag or box. Doing this can lead to overeating. Put the amount you would like to eat in a cup or on a plate to make sure you are eating the right portion.  · Use smaller plates, glasses, and bowls to prevent overeating. This is a quick and easy way to practice portion control. If your plate is smaller, less food can fit on it.  · Try not to multitask while eating, such as watching TV or using your computer. If it is time to eat, sit down at a table and enjoy your food. Doing this will help you to start recognizing when you are full. It will also make you more aware of what and how much you are eating.  HOW CAN I CALORIE COUNT WHEN EATING OUT?  · Ask for smaller portion sizes or child-sized portions.  · Consider sharing an entree and sides instead of getting your own entree.  · If you get your own entree, eat only half. Ask for a box at the beginning of your meal and put the rest of your entree in it so you are not tempted to eat it.  · Look for the calories  "on the menu. If calories are listed, choose the lower calorie options.  · Choose dishes that include vegetables, fruits, whole grains, low-fat dairy products, and lean protein. Focusing on smart food choices from each of the 5 food groups can help you stay on track at restaurants.  · Choose items that are boiled, broiled, grilled, or steamed.  · Choose water, milk, unsweetened iced tea, or other drinks without added sugars. If you want an alcoholic beverage, choose a lower calorie option. For example, a regular paul can have up to 700 calories and a glass of wine has around 150.  · Stay away from items that are buttered, battered, fried, or served with cream sauce. Items labeled \"crispy\" are usually fried, unless stated otherwise.  · Ask for dressings, sauces, and syrups on the side. These are usually very high in calories, so do not eat much of them.  · Watch out for salads. Many people think salads are a healthy option, but this is often not the case. Many salads come with valdez, fried chicken, lots of cheese, fried chips, and dressing. All of these items have a lot of calories. If you want a salad, choose a garden salad and ask for grilled meats or steak. Ask for the dressing on the side, or ask for olive oil and vinegar or lemon to use as dressing.  · Estimate how many servings of a food you are given. For example, a serving of cooked rice is ½ cup or about the size of half a tennis ball or one cupcake wrapper. Knowing serving sizes will help you be aware of how much food you are eating at restaurants. The list below tells you how big or small some common portion sizes are based on everyday objects.  ¨ 1 oz--4 stacked dice.  ¨ 3 oz--1 deck of cards.  ¨ 1 tsp--1 dice.  ¨ 1 Tbsp--½ a Ping-Pong ball.  ¨ 2 Tbsp--1 Ping-Pong ball.  ¨ ½ cup--1 tennis ball or 1 cupcake wrapper.  ¨ 1 cup--1 baseball.     This information is not intended to replace advice given to you by your health care provider. Make sure you " discuss any questions you have with your health care provider.     Document Released: 12/18/2006 Document Revised: 01/08/2016 Document Reviewed: 10/23/2014  [employer] Interactive Patient Education ©2017 [employer]

## 2018-10-19 NOTE — PROGRESS NOTES
Juan C Connell MD  Em Marrero  1960  10/19/2018    Patient Active Problem List   Diagnosis   • Palpitations   • Dyslipidemia   • Hypertension   • Gastroesophageal reflux disease without esophagitis   • Pulmonary emphysema (CMS/HCC)   • Arthritis   • Hives   • Neck pain   • Bruit of right carotid artery   • Precordial pain       Dear Juan C Connell MD:    Subjective       History of Present Illness:    Chief Complaint   Patient presents with   • Follow-up   • Med Management     Verbal med list.    • Palpitations   • Hypertension       Em Marrero is a pleasant 58 y.o. female with a past medical history significant for essential hypertension, dyslipidemia, COPD, and history of palpitations.  Patient comes in for routine cardiology follow-up.    Patient states since increasing her diltiazem that she has been doing well.  She denies any chest pain, worsening of palpitations, weakness, fatigue, dizziness, or syncope.  She also denies any orthopnea, PND, or pedal edema.      Allergies   Allergen Reactions   • Coreg [Carvedilol] Shortness Of Breath   • Meloxicam Other (See Comments)     Blister on lip   • Metoprolol Hives   • Dexamethasone Rash   • Ketorolac Rash   • Ketorolac Tromethamine Rash   • Synthroid [Levothyroxine Sodium] Other (See Comments)     Blister on lip   :      Current Outpatient Prescriptions:   •  ALLERGY SERUM INJECTION, Inject  under the skin 1 (One) Time., Disp: , Rfl:   •  amitriptyline (ELAVIL) 10 MG tablet, Take 10 mg by mouth every night., Disp: , Rfl:   •  ANORO ELLIPTA 62.5-25 MCG/INH aerosol powder , INL 1 PUFF PO D, Disp: , Rfl: 2  •  atorvastatin (LIPITOR) 40 MG tablet, Take 1 tablet by mouth Daily., Disp: 30 tablet, Rfl: 4  •  baclofen (LIORESAL) 10 MG tablet, Take 10 mg by mouth 2 (Two) Times a Day. 2 tabs of the night., Disp: , Rfl:   •  diltiaZEM CD (CARDIZEM CD) 300 MG 24 hr capsule, Take 1 capsule by mouth Daily., Disp: 30 capsule, Rfl: 6  •  esomeprazole (nexIUM) 40 MG  capsule, TAKE 1 CAPSULE BY MOUTH DAILY 30 MINUTES BEFORE BREAKFAST, Disp: 90 capsule, Rfl: 5  •  estradiol (ESTRACE) 1 MG tablet, Take 1 mg by mouth Daily., Disp: , Rfl:   •  furosemide (LASIX) 20 MG tablet, Take 1 tablet by mouth As Needed (LE edema)., Disp: 30 tablet, Rfl: 6  •  hydrochlorothiazide (MICROZIDE) 12.5 MG capsule, Take 12.5 mg by mouth daily., Disp: , Rfl:   •  ipratropium-albuterol (COMBIVENT RESPIMAT)  MCG/ACT inhaler, Inhale 1 puff 6 (six) times a day., Disp: , Rfl:   •  ipratropium-albuterol (COMBIVENT RESPIMAT)  MCG/ACT inhaler, Inhale 1 puff., Disp: , Rfl:   •  medroxyPROGESTERone (PROVERA) 5 MG tablet, Take 5 mg by mouth Daily., Disp: , Rfl:   •  montelukast (SINGULAIR) 10 MG tablet, TK 1 T PO QD, Disp: , Rfl: 11  •  O2 (OXYGEN), Inhale 2 L/min 1 (one) time, Disp: , Rfl:   •  ondansetron (ZOFRAN) 8 MG tablet, Take 1 tablet by mouth every 8 (eight) hours as needed for nausea or vomiting., Disp: 25 tablet, Rfl: 1  •  ondansetron (ZOFRAN) 8 MG tablet, Take  by mouth Daily., Disp: , Rfl:   •  SITagliptin (JANUVIA) 100 MG tablet, Take 100 mg by mouth Daily., Disp: , Rfl:   •  vitamin D (ERGOCALCIFEROL) 25725 units capsule capsule, Take 1 capsule by mouth Daily., Disp: , Rfl:   •  acetaminophen (TYLENOL) 500 MG tablet, Take 500 mg by mouth Every 6 (Six) Hours As Needed for Mild Pain ., Disp: , Rfl:   •  lubiprostone (AMITIZA) 24 MCG capsule, Take 1 capsule by mouth 2 (Two) Times a Day With Meals., Disp: 60 capsule, Rfl: 5      The following portions of the patient's history were reviewed and updated as appropriate: allergies, current medications, past family history, past medical history, past social history, past surgical history and problem list.    Social History   Substance Use Topics   • Smoking status: Current Every Day Smoker     Types: Electronic Cigarette   • Smokeless tobacco: Never Used   • Alcohol use No       Review of Systems   Constitution: Negative for weakness and  "malaise/fatigue.   Cardiovascular: Positive for palpitations. Negative for chest pain, leg swelling and syncope.   Respiratory: Negative for cough and shortness of breath.    Hematologic/Lymphatic: Negative for bleeding problem. Does not bruise/bleed easily.   Neurological: Negative for dizziness.       Objective   Vitals:    10/19/18 1022   BP: 111/74   BP Location: Left arm   Patient Position: Sitting   Cuff Size: Adult   Pulse: 75   SpO2: 97%   Weight: 94 kg (207 lb 3.2 oz)   Height: 165.1 cm (65\")     Body mass index is 34.48 kg/m².        Physical Exam   Constitutional: She is oriented to person, place, and time. She appears well-developed and well-nourished. No distress.   Cardiovascular: Normal rate, regular rhythm and normal heart sounds.    No murmur heard.  Pulmonary/Chest: Effort normal and breath sounds normal. No respiratory distress. She has no wheezes.   Neurological: She is alert and oriented to person, place, and time.   Skin: She is not diaphoretic.       Lab Results   Component Value Date     08/16/2018    K 4.0 08/16/2018     08/16/2018    CO2 22.8 (L) 08/16/2018    BUN 13 08/16/2018    CREATININE 1.01 08/16/2018    GLUCOSE 110 08/16/2018    CALCIUM 9.1 08/16/2018    AST 24 08/16/2018    ALT 25 08/16/2018    ALKPHOS 84 08/16/2018    LABIL2 1.3 (L) 04/13/2016     Lab Results   Component Value Date    CKTOTAL 130 12/23/2014     Lab Results   Component Value Date    WBC 9.68 08/16/2018    HGB 14.5 08/16/2018    HCT 43.2 08/16/2018     08/16/2018     Lab Results   Component Value Date    INR <0.90 11/24/2014    INR 0.94 08/16/2014     Lab Results   Component Value Date    MG 2.0 08/16/2014     Lab Results   Component Value Date    TSH 0.958 08/16/2014    CHLPL 215 (H) 04/13/2016    TRIG 144 04/16/2018    HDL 41 (L) 04/16/2018     (H) 04/16/2018      Lab Results   Component Value Date    BNP 7 11/24/2014       During this visit the following were done:  Labs Reviewed [x]  "   Labs Ordered []    Radiology Reports Reviewed [x]    Radiology Ordered []    PCP Records Reviewed []    Referring Provider Records Reviewed []    ER Records Reviewed []    Hospital Records Reviewed []    History Obtained From Family []    Radiology Images Reviewed []    Other Reviewed []    Records Requested []         ECG 12 Lead  Date/Time: 10/19/2018 10:17 AM  Performed by: JOLIE NEVILLE  Authorized by: JOLIE NEVILLE   Rhythm: sinus rhythm  ST Segments: ST segments normal  T depression: V1  T flattening: V2, V3, V4, V5 and V6  Clinical impression: non-specific ECG  Comments:                 Assessment/Plan    Diagnosis Plan   1. Essential hypertension     2. Palpitations     3. Bruit of right carotid artery     4. Dyslipidemia                  Recommendations:  1. I did offer the patient undergo a 30 day event monitor to evaluate her palpitations however she declined to have this done as her palpitations have been chronic and not gotten any worse.  2. Continue Lipitor, diltiazem, HCTZ.  3. Follow-up in 6 months.    Patient's Body mass index is 34.48 kg/m². BMI is above normal parameters. Recommendations include: educational material.       Return in about 6 months (around 4/19/2019).    As always, I appreciate very much the opportunity to participate in the cardiovascular care of your patients.      With Best Regards,    PATIENCE Veliz disclaimer:  Much of this encounter note is an electronic transcription/translation of spoken language to printed text. The electronic translation of spoken language may permit erroneous, or at times, nonsensical words or phrases to be inadvertently transcribed; Although I have reviewed the note for such errors, some may still exist.

## 2019-02-07 ENCOUNTER — HOSPITAL ENCOUNTER (OUTPATIENT)
Dept: MAMMOGRAPHY | Facility: HOSPITAL | Age: 59
Discharge: HOME | End: 2019-02-07
Admitting: INTERNAL MEDICINE

## 2019-02-07 DIAGNOSIS — Z09 FOLLOW-UP EXAM, 3-6 MONTHS SINCE PREVIOUS EXAM: ICD-10-CM

## 2019-02-07 PROCEDURE — 77066 DX MAMMO INCL CAD BI: CPT | Performed by: RADIOLOGY

## 2019-02-07 PROCEDURE — G0279 TOMOSYNTHESIS, MAMMO: HCPCS

## 2019-02-07 PROCEDURE — 77066 DX MAMMO INCL CAD BI: CPT

## 2019-02-07 PROCEDURE — G0279 TOMOSYNTHESIS, MAMMO: HCPCS | Performed by: RADIOLOGY

## 2019-03-21 RX ORDER — DILTIAZEM HYDROCHLORIDE 300 MG/1
300 CAPSULE, COATED, EXTENDED RELEASE ORAL DAILY
Qty: 30 CAPSULE | Refills: 3 | Status: SHIPPED | OUTPATIENT
Start: 2019-03-21 | End: 2019-07-22 | Stop reason: SDUPTHER

## 2019-04-29 ENCOUNTER — OFFICE VISIT (OUTPATIENT)
Dept: CARDIOLOGY | Facility: CLINIC | Age: 59
End: 2019-04-29

## 2019-04-29 VITALS
OXYGEN SATURATION: 98 % | DIASTOLIC BLOOD PRESSURE: 77 MMHG | HEIGHT: 65 IN | BODY MASS INDEX: 34.49 KG/M2 | WEIGHT: 207 LBS | SYSTOLIC BLOOD PRESSURE: 137 MMHG

## 2019-04-29 DIAGNOSIS — E78.5 DYSLIPIDEMIA: ICD-10-CM

## 2019-04-29 DIAGNOSIS — R00.2 PALPITATIONS: ICD-10-CM

## 2019-04-29 DIAGNOSIS — I10 ESSENTIAL HYPERTENSION: Primary | ICD-10-CM

## 2019-04-29 PROCEDURE — 99213 OFFICE O/P EST LOW 20 MIN: CPT | Performed by: PHYSICIAN ASSISTANT

## 2019-04-29 PROCEDURE — 93000 ELECTROCARDIOGRAM COMPLETE: CPT | Performed by: PHYSICIAN ASSISTANT

## 2019-04-29 RX ORDER — ATORVASTATIN CALCIUM 20 MG/1
20 TABLET, FILM COATED ORAL DAILY
Qty: 30 TABLET | Refills: 2 | Status: SHIPPED | OUTPATIENT
Start: 2019-04-29 | End: 2021-05-03

## 2019-04-30 ENCOUNTER — HOSPITAL ENCOUNTER (OUTPATIENT)
Dept: BONE DENSITY | Facility: HOSPITAL | Age: 59
Discharge: HOME | End: 2019-04-30
Admitting: INTERNAL MEDICINE

## 2019-04-30 DIAGNOSIS — Z78.0 POSTMENOPAUSAL: ICD-10-CM

## 2019-04-30 PROCEDURE — 77080 DXA BONE DENSITY AXIAL: CPT

## 2019-04-30 PROCEDURE — 77080 DXA BONE DENSITY AXIAL: CPT | Performed by: RADIOLOGY

## 2019-07-22 ENCOUNTER — TELEPHONE (OUTPATIENT)
Dept: CARDIOLOGY | Facility: CLINIC | Age: 59
End: 2019-07-22

## 2019-07-22 RX ORDER — DILTIAZEM HYDROCHLORIDE 300 MG/1
300 CAPSULE, COATED, EXTENDED RELEASE ORAL DAILY
Qty: 30 CAPSULE | Refills: 3 | Status: SHIPPED | OUTPATIENT
Start: 2019-07-22 | End: 2019-11-26 | Stop reason: SDUPTHER

## 2019-10-25 PROCEDURE — 93010 ELECTROCARDIOGRAM REPORT: CPT | Performed by: INTERNAL MEDICINE

## 2019-10-25 PROCEDURE — 99284 EMERGENCY DEPT VISIT MOD MDM: CPT

## 2019-10-25 PROCEDURE — 93005 ELECTROCARDIOGRAM TRACING: CPT | Performed by: EMERGENCY MEDICINE

## 2019-10-26 ENCOUNTER — HOSPITAL ENCOUNTER (EMERGENCY)
Facility: HOSPITAL | Age: 59
Discharge: HOME | End: 2019-10-26
Attending: EMERGENCY MEDICINE | Admitting: EMERGENCY MEDICINE

## 2019-10-26 ENCOUNTER — APPOINTMENT (OUTPATIENT)
Dept: GENERAL RADIOLOGY | Facility: HOSPITAL | Age: 59
End: 2019-10-26

## 2019-10-26 VITALS
DIASTOLIC BLOOD PRESSURE: 75 MMHG | WEIGHT: 210 LBS | SYSTOLIC BLOOD PRESSURE: 115 MMHG | TEMPERATURE: 98 F | HEIGHT: 65 IN | HEART RATE: 74 BPM | BODY MASS INDEX: 34.99 KG/M2 | OXYGEN SATURATION: 96 % | RESPIRATION RATE: 18 BRPM

## 2019-10-26 DIAGNOSIS — R07.9 NONSPECIFIC CHEST PAIN: Primary | ICD-10-CM

## 2019-10-26 DIAGNOSIS — K21.00 GERD WITH ESOPHAGITIS: ICD-10-CM

## 2019-10-26 LAB
ALBUMIN SERPL-MCNC: 3.89 G/DL (ref 3.5–5.2)
ALBUMIN/GLOB SERPL: 1.1 G/DL
ALP SERPL-CCNC: 88 U/L (ref 39–117)
ALT SERPL W P-5'-P-CCNC: 24 U/L (ref 1–33)
ANION GAP SERPL CALCULATED.3IONS-SCNC: 12.2 MMOL/L (ref 5–15)
AST SERPL-CCNC: 18 U/L (ref 1–32)
BASOPHILS # BLD AUTO: 0.06 10*3/MM3 (ref 0–0.2)
BASOPHILS NFR BLD AUTO: 0.6 % (ref 0–1.5)
BILIRUB SERPL-MCNC: <0.2 MG/DL (ref 0.2–1.2)
BILIRUB UR QL STRIP: NEGATIVE
BUN BLD-MCNC: 15 MG/DL (ref 8–23)
BUN/CREAT SERPL: 18.1 (ref 7–25)
CALCIUM SPEC-SCNC: 9.2 MG/DL (ref 8.6–10.5)
CHLORIDE SERPL-SCNC: 103 MMOL/L (ref 98–107)
CLARITY UR: CLEAR
CO2 SERPL-SCNC: 25.8 MMOL/L (ref 22–29)
COLOR UR: YELLOW
CREAT BLD-MCNC: 0.83 MG/DL (ref 0.57–1)
DEPRECATED RDW RBC AUTO: 39.7 FL (ref 37–54)
EOSINOPHIL # BLD AUTO: 0.43 10*3/MM3 (ref 0–0.4)
EOSINOPHIL NFR BLD AUTO: 4.5 % (ref 0.3–6.2)
ERYTHROCYTE [DISTWIDTH] IN BLOOD BY AUTOMATED COUNT: 13.1 % (ref 12.3–15.4)
GFR SERPL CREATININE-BSD FRML MDRD: 70 ML/MIN/1.73
GLOBULIN UR ELPH-MCNC: 3.4 GM/DL
GLUCOSE BLD-MCNC: 142 MG/DL (ref 65–99)
GLUCOSE UR STRIP-MCNC: NEGATIVE MG/DL
HCT VFR BLD AUTO: 44.5 % (ref 34–46.6)
HGB BLD-MCNC: 14.1 G/DL (ref 12–15.9)
HGB UR QL STRIP.AUTO: NEGATIVE
IMM GRANULOCYTES # BLD AUTO: 0.02 10*3/MM3 (ref 0–0.05)
IMM GRANULOCYTES NFR BLD AUTO: 0.2 % (ref 0–0.5)
KETONES UR QL STRIP: NEGATIVE
LEUKOCYTE ESTERASE UR QL STRIP.AUTO: NEGATIVE
LYMPHOCYTES # BLD AUTO: 3.03 10*3/MM3 (ref 0.7–3.1)
LYMPHOCYTES NFR BLD AUTO: 31.7 % (ref 19.6–45.3)
MCH RBC QN AUTO: 26.5 PG (ref 26.6–33)
MCHC RBC AUTO-ENTMCNC: 31.7 G/DL (ref 31.5–35.7)
MCV RBC AUTO: 83.6 FL (ref 79–97)
MONOCYTES # BLD AUTO: 0.77 10*3/MM3 (ref 0.1–0.9)
MONOCYTES NFR BLD AUTO: 8 % (ref 5–12)
NEUTROPHILS # BLD AUTO: 5.26 10*3/MM3 (ref 1.7–7)
NEUTROPHILS NFR BLD AUTO: 55 % (ref 42.7–76)
NITRITE UR QL STRIP: NEGATIVE
NRBC BLD AUTO-RTO: 0 /100 WBC (ref 0–0.2)
PH UR STRIP.AUTO: 5.5 [PH] (ref 5–8)
PLATELET # BLD AUTO: 251 10*3/MM3 (ref 140–450)
PMV BLD AUTO: 10.1 FL (ref 6–12)
POTASSIUM BLD-SCNC: 4 MMOL/L (ref 3.5–5.2)
PROT SERPL-MCNC: 7.3 G/DL (ref 6–8.5)
PROT UR QL STRIP: NEGATIVE
RBC # BLD AUTO: 5.32 10*6/MM3 (ref 3.77–5.28)
SODIUM BLD-SCNC: 141 MMOL/L (ref 136–145)
SP GR UR STRIP: 1.01 (ref 1–1.03)
TROPONIN T SERPL-MCNC: <0.01 NG/ML (ref 0–0.03)
TROPONIN T SERPL-MCNC: <0.01 NG/ML (ref 0–0.03)
UROBILINOGEN UR QL STRIP: NORMAL
WBC NRBC COR # BLD: 9.57 10*3/MM3 (ref 3.4–10.8)

## 2019-10-26 PROCEDURE — 84484 ASSAY OF TROPONIN QUANT: CPT | Performed by: EMERGENCY MEDICINE

## 2019-10-26 PROCEDURE — 80053 COMPREHEN METABOLIC PANEL: CPT | Performed by: EMERGENCY MEDICINE

## 2019-10-26 PROCEDURE — 71045 X-RAY EXAM CHEST 1 VIEW: CPT

## 2019-10-26 PROCEDURE — 85025 COMPLETE CBC W/AUTO DIFF WBC: CPT | Performed by: EMERGENCY MEDICINE

## 2019-10-26 PROCEDURE — 81003 URINALYSIS AUTO W/O SCOPE: CPT | Performed by: EMERGENCY MEDICINE

## 2019-10-26 PROCEDURE — 36415 COLL VENOUS BLD VENIPUNCTURE: CPT

## 2019-10-26 PROCEDURE — 93005 ELECTROCARDIOGRAM TRACING: CPT | Performed by: EMERGENCY MEDICINE

## 2019-10-26 RX ORDER — SODIUM CHLORIDE 0.9 % (FLUSH) 0.9 %
10 SYRINGE (ML) INJECTION AS NEEDED
Status: DISCONTINUED | OUTPATIENT
Start: 2019-10-26 | End: 2019-10-26 | Stop reason: HOSPADM

## 2019-10-26 RX ORDER — ALUMINA, MAGNESIA, AND SIMETHICONE 2400; 2400; 240 MG/30ML; MG/30ML; MG/30ML
15 SUSPENSION ORAL ONCE
Status: COMPLETED | OUTPATIENT
Start: 2019-10-26 | End: 2019-10-26

## 2019-10-26 RX ORDER — SUCRALFATE 1 G/1
1 TABLET ORAL ONCE
Status: COMPLETED | OUTPATIENT
Start: 2019-10-26 | End: 2019-10-26

## 2019-10-26 RX ORDER — SUCRALFATE 1 G/1
1 TABLET ORAL 4 TIMES DAILY
Qty: 56 TABLET | Refills: 0 | Status: SHIPPED | OUTPATIENT
Start: 2019-10-26 | End: 2019-11-09

## 2019-10-26 RX ORDER — LIDOCAINE HYDROCHLORIDE 20 MG/ML
15 SOLUTION OROPHARYNGEAL ONCE
Status: COMPLETED | OUTPATIENT
Start: 2019-10-26 | End: 2019-10-26

## 2019-10-26 RX ORDER — NITROGLYCERIN 0.4 MG/1
0.4 TABLET SUBLINGUAL ONCE
Status: COMPLETED | OUTPATIENT
Start: 2019-10-26 | End: 2019-10-26

## 2019-10-26 RX ORDER — PHENOBARBITAL, HYOSCYAMINE SULFATE, ATROPINE SULFATE AND SCOPOLAMINE HYDROBROMIDE .0194; .1037; 16.2; .0065 MG/1; MG/1; MG/1; MG/1
2 TABLET ORAL ONCE
Status: COMPLETED | OUTPATIENT
Start: 2019-10-26 | End: 2019-10-26

## 2019-10-26 RX ORDER — AMITRIPTYLINE HYDROCHLORIDE 10 MG/1
10 TABLET, FILM COATED ORAL NIGHTLY
Qty: 30 TABLET | Refills: 2 | Status: SHIPPED | OUTPATIENT
Start: 2019-10-26

## 2019-10-26 RX ADMIN — PHENOBARBITAL, HYOSCYAMINE SULFATE, ATROPINE SULFATE, SCOPOLAMINE HYDROBROMIDE 32.4 MG: 16.2; .1037; .0194; .0065 TABLET ORAL at 00:52

## 2019-10-26 RX ADMIN — LIDOCAINE HYDROCHLORIDE 15 ML: 20 SOLUTION ORAL; TOPICAL at 00:52

## 2019-10-26 RX ADMIN — SODIUM CHLORIDE 1000 ML: 9 INJECTION, SOLUTION INTRAVENOUS at 00:52

## 2019-10-26 RX ADMIN — ALUMINUM HYDROXIDE, MAGNESIUM HYDROXIDE, AND DIMETHICONE 15 ML: 400; 400; 40 SUSPENSION ORAL at 00:52

## 2019-10-26 RX ADMIN — NITROGLYCERIN 0.4 MG: 0.4 TABLET, ORALLY DISINTEGRATING SUBLINGUAL at 00:53

## 2019-10-26 RX ADMIN — SUCRALFATE 1 G: 1 TABLET ORAL at 00:52

## 2019-10-28 ENCOUNTER — EPISODE CHANGES (OUTPATIENT)
Dept: CASE MANAGEMENT | Facility: OTHER | Age: 59
End: 2019-10-28

## 2019-10-29 ENCOUNTER — OFFICE VISIT (OUTPATIENT)
Dept: CARDIOLOGY | Facility: CLINIC | Age: 59
End: 2019-10-29

## 2019-10-29 VITALS
HEART RATE: 78 BPM | DIASTOLIC BLOOD PRESSURE: 86 MMHG | BODY MASS INDEX: 35.62 KG/M2 | SYSTOLIC BLOOD PRESSURE: 131 MMHG | WEIGHT: 213.8 LBS | RESPIRATION RATE: 16 BRPM | HEIGHT: 65 IN

## 2019-10-29 DIAGNOSIS — R00.2 PALPITATIONS: ICD-10-CM

## 2019-10-29 DIAGNOSIS — I10 ESSENTIAL HYPERTENSION: ICD-10-CM

## 2019-10-29 DIAGNOSIS — E78.5 DYSLIPIDEMIA: ICD-10-CM

## 2019-10-29 DIAGNOSIS — R07.2 PRECORDIAL PAIN: Primary | ICD-10-CM

## 2019-10-29 PROCEDURE — 99214 OFFICE O/P EST MOD 30 MIN: CPT | Performed by: PHYSICIAN ASSISTANT

## 2019-10-29 RX ORDER — NITROGLYCERIN 0.4 MG/1
TABLET SUBLINGUAL
Qty: 100 TABLET | Refills: 11 | Status: SHIPPED | OUTPATIENT
Start: 2019-10-29

## 2019-10-29 NOTE — PROGRESS NOTES
Juan C Connell MD  Em Marrero  1960  10/29/2019    Patient Active Problem List   Diagnosis   • Palpitations   • Dyslipidemia   • Essential hypertension   • Gastroesophageal reflux disease without esophagitis   • Pulmonary emphysema (CMS/HCC)   • Arthritis   • Hives   • Neck pain   • Bruit of right carotid artery   • Precordial pain       Dear Juan C Connell MD:    Subjective     History of Present Illness:    Chief Complaint   Patient presents with   • Palpitations     6 mos follow up, unchanged   • Chest Pain     er visit recent, diag esophgeal inflam   • Shortness of Breath     routine activity   • Edema     LE   • Med Management     list provided       Em Marrero is a pleasant 59 y.o. female with a past medical history significant for essential hypertension, dyslipidemia, COPD, and history of palpitations.  Patient comes in for routine cardiology follow-up.     Patient reports she has been having some chest pains and was recently evaluated at [facility] emergency department where she was ruled out for acute myocardial infarction.  She reports she is having pain in her midsternal area she describes as an ache/burning sensation she does report this pain lasts typically hours if she does not do or take anything that resolves it.  She does report she was given nitroglycerin sublingual in the emergency department that did relieve the pain.  She also reports she has been taking sucralfate 4 times daily which has helped as well.  She does report history of esophageal spasms and has had a esophagus dilatation done by Dr. Guzman in the past.  She also reports pain in her neck that is particularly worse when she lays flat at night when she goes to sleep.  She also reports she has been swelling easily in her abdomen and as well as her lower extremities.  She does report bending forward can bring this midsternal pain on and she does deny any associated symptoms of this such as shortness of breath or  diaphoresis.    Allergies   Allergen Reactions   • Coreg [Carvedilol] Shortness Of Breath   • Meloxicam Other (See Comments)     Blister on lip   • Metoprolol Hives   • Dexamethasone Rash   • Ketorolac Rash   • Ketorolac Tromethamine Rash   • Synthroid [Levothyroxine Sodium] Other (See Comments)     Blister on lip   :      Current Outpatient Medications:   •  amitriptyline (ELAVIL) 10 MG tablet, Take 1 tablet by mouth Every Night., Disp: 30 tablet, Rfl: 2  •  atorvastatin (LIPITOR) 20 MG tablet, Take 1 tablet by mouth Daily., Disp: 30 tablet, Rfl: 2  •  baclofen (LIORESAL) 10 MG tablet, Take 10 mg by mouth 2 (Two) Times a Day. 2 tabs of the night., Disp: , Rfl:   •  diltiaZEM CD (CARDIZEM CD) 300 MG 24 hr capsule, Take 1 capsule by mouth Daily., Disp: 30 capsule, Rfl: 3  •  esomeprazole (nexIUM) 40 MG capsule, TAKE 1 CAPSULE BY MOUTH DAILY 30 MINUTES BEFORE BREAKFAST, Disp: 90 capsule, Rfl: 5  •  estradiol (ESTRACE) 1 MG tablet, Take 1 mg by mouth Daily., Disp: , Rfl:   •  hydrochlorothiazide (MICROZIDE) 12.5 MG capsule, Take 12.5 mg by mouth daily., Disp: , Rfl:   •  ipratropium-albuterol (COMBIVENT RESPIMAT)  MCG/ACT inhaler, Inhale 1 puff 6 (six) times a day., Disp: , Rfl:   •  O2 (OXYGEN), Inhale 2 L/min 1 (one) time, Disp: , Rfl:   •  ondansetron (ZOFRAN) 8 MG tablet, Take  by mouth Daily., Disp: , Rfl:   •  SITagliptin (JANUVIA) 100 MG tablet, Take 100 mg by mouth Daily., Disp: , Rfl:   •  sucralfate (CARAFATE) 1 g tablet, Take 1 tablet by mouth 4 (Four) Times a Day for 14 days., Disp: 56 tablet, Rfl: 0  •  vitamin D (ERGOCALCIFEROL) 80982 units capsule capsule, Take 5,000 Units by mouth Daily., Disp: , Rfl:   •  acetaminophen (TYLENOL) 500 MG tablet, Take 500 mg by mouth Every 6 (Six) Hours As Needed for Mild Pain ., Disp: , Rfl:   •  ALLERGY SERUM INJECTION, Inject  under the skin 1 (One) Time., Disp: , Rfl:   •  amitriptyline (ELAVIL) 10 MG tablet, Take 10 mg by mouth every night., Disp: , Rfl:   •   "ANORO ELLIPTA 62.5-25 MCG/INH aerosol powder , INL 1 PUFF PO D, Disp: , Rfl: 2  •  furosemide (LASIX) 20 MG tablet, Take 1 tablet by mouth As Needed (LE edema)., Disp: 30 tablet, Rfl: 6  •  ipratropium-albuterol (COMBIVENT RESPIMAT)  MCG/ACT inhaler, Inhale 1 puff., Disp: , Rfl:   •  lubiprostone (AMITIZA) 24 MCG capsule, Take 1 capsule by mouth 2 (Two) Times a Day With Meals., Disp: 60 capsule, Rfl: 5  •  medroxyPROGESTERone (PROVERA) 5 MG tablet, Take 5 mg by mouth Daily., Disp: , Rfl:   •  montelukast (SINGULAIR) 10 MG tablet, TK 1 T PO QD, Disp: , Rfl: 11  •  nitroglycerin (NITROSTAT) 0.4 MG SL tablet, 1 under the tongue as needed for angina, may repeat q5mins for up three doses, Disp: 100 tablet, Rfl: 11  •  ondansetron (ZOFRAN) 8 MG tablet, Take 1 tablet by mouth every 8 (eight) hours as needed for nausea or vomiting., Disp: 25 tablet, Rfl: 1    The following portions of the patient's history were reviewed and updated as appropriate: allergies, current medications, past family history, past medical history, past social history, past surgical history and problem list.    Social History     Tobacco Use   • Smoking status: Current Every Day Smoker     Types: Electronic Cigarette   • Smokeless tobacco: Never Used   Substance Use Topics   • Alcohol use: No   • Drug use: No       Review of Systems   Constitution: Negative for weakness and malaise/fatigue.   Cardiovascular: Positive for chest pain, leg swelling and palpitations. Negative for dyspnea on exertion and irregular heartbeat.   Respiratory: Positive for shortness of breath. Negative for cough.    Hematologic/Lymphatic: Negative for bleeding problem. Does not bruise/bleed easily.   Gastrointestinal: Positive for bloating. Negative for nausea and vomiting.     Objective   Vitals:    10/29/19 1004   BP: 131/86   Pulse: 78   Resp: 16   Weight: 97 kg (213 lb 12.8 oz)   Height: 165.1 cm (65\")     Body mass index is 35.58 kg/m².    Physical Exam "   Constitutional: She is oriented to person, place, and time. She appears well-developed and well-nourished. No distress.   HENT:   Head: Normocephalic and atraumatic.   Cardiovascular: Normal rate, regular rhythm and normal heart sounds.   Pulmonary/Chest: Effort normal and breath sounds normal. No respiratory distress.   Musculoskeletal: She exhibits no edema.   Neurological: She is alert and oriented to person, place, and time.   Skin: She is not diaphoretic.       Lab Results   Component Value Date     10/26/2019    K 4.0 10/26/2019     10/26/2019    CO2 25.8 10/26/2019    BUN 15 10/26/2019    CREATININE 0.83 10/26/2019    GLUCOSE 142 (H) 10/26/2019    CALCIUM 9.2 10/26/2019    AST 18 10/26/2019    ALT 24 10/26/2019    ALKPHOS 88 10/26/2019    LABIL2 1.3 (L) 04/13/2016     Lab Results   Component Value Date    CKTOTAL 130 12/23/2014     Lab Results   Component Value Date    WBC 9.57 10/26/2019    HGB 14.1 10/26/2019    HCT 44.5 10/26/2019     10/26/2019     Lab Results   Component Value Date    INR <0.90 11/24/2014    INR 0.94 08/16/2014     Lab Results   Component Value Date    MG 2.0 08/16/2014     Lab Results   Component Value Date    TSH 0.958 08/16/2014    CHLPL 215 (H) 04/13/2016    TRIG 144 04/16/2018    HDL 41 (L) 04/16/2018     (H) 04/16/2018      Lab Results   Component Value Date    BNP 7 11/24/2014       During this visit the following were done:  Labs Reviewed [x]    Labs Ordered []    Radiology Reports Reviewed [x]    Radiology Ordered []    PCP Records Reviewed []    Referring Provider Records Reviewed []    ER Records Reviewed []    Hospital Records Reviewed []    History Obtained From Family []    Radiology Images Reviewed []    Other Reviewed []    Records Requested []       Procedures    Assessment/Plan    Diagnosis Plan   1. Precordial pain  Stress Test With Myocardial Perfusion    Adult Transthoracic Echo Complete W/ Cont if Necessary Per Protocol   2. Dyslipidemia      3. Essential hypertension     4. Palpitations              Recommendations:  1. I will go ahead and order a stress test and echocardiogram to rule out possible underlying myocardial ischemia.  I did discuss with her the most likely she is experiencing GERD or other GI issues as she does have history of esophageal spasms and should schedule appoint to see her GI specialist, Dr. Guzman.  She expressed understanding.  2. Continue lipitor, diltiazem, HCTZ, and aspirin.     Patient's Body mass index is 35.58 kg/m². BMI is above normal parameters. Recommendations include: educational material and nutrition counseling.     Return in about 3 months (around 1/29/2020).    As always, I appreciate very much the opportunity to participate in the cardiovascular care of your patients.      With Best Regards,    López Bowles PA-C

## 2019-10-31 ENCOUNTER — PATIENT OUTREACH (OUTPATIENT)
Dept: CASE MANAGEMENT | Facility: OTHER | Age: 59
End: 2019-10-31

## 2019-10-31 NOTE — OUTREACH NOTE
Care Coordination Assessment    Documented/Reviewed By:  Dottie Francois RN Date/time:  10/31/2019  2:54 PM   Assessment completed with:  patient  Enrolled in care management program:  No  Living arrangement:  alone  Support system:  family  Type of residence:  private residence  Home care services:  No  Equipment used at home:  none  Bed or wheelchair confined:  No  Inadequate nutrition:  No  Medication adherence problem:  No  Experiencing side effects from current medications:  No  History of fall(s) in last 6 months:  No  Difficulty keeping appointments:  No     Called patient in follow up to ED visit 10-26-19 with chest pain/ GERD with Esophagitis.  Explained role of Ambulatory  and contact information given to patient.  Patient stated she is doing better now.  ACM discussed ED discharge instructions. Patient states she saw her Cardiologist 10/29, and he plans to do some tests soon.  States she has an appt with GI doctor 11/19;  Has a PCP appt tomorrow, 11/1.  Stated she drives herself to appts.  Patient voiced compliance with medications as recommended from the ED, and states they have helped her.  Voiced compliance with all daily medications as ordered.  Patient said she is independent with ADL's, Mobility, Driving and Medications.  Denies having any chest or epigastric pain today.  ACM discussed Diabetes management; importance of daily monitoring, Diab. Diet. Patient said she only checks her blood sugar about once a month, or if symptoms occur of high/low blood sugar; reports no symptoms or problems with her diabetes/ blood sugar.  ACM reviewed Gaps in Care (Flu and Pneumonia vaccines) and need to schedule Annual Wellness Visit.  Patient stated she refuses to take the flu or pneumonia shots; stated she will schedule the AWV with Dr. Connell tomorrow while in the office.  MyChart is active; no questions regarding use.  No questions or concerns voiced at this time.

## 2019-11-04 ENCOUNTER — EPISODE CHANGES (OUTPATIENT)
Dept: CASE MANAGEMENT | Facility: OTHER | Age: 59
End: 2019-11-04

## 2019-11-15 ENCOUNTER — HOSPITAL ENCOUNTER (OUTPATIENT)
Dept: CARDIOLOGY | Facility: HOSPITAL | Age: 59
Discharge: HOME | End: 2019-11-15

## 2019-11-15 ENCOUNTER — HOSPITAL ENCOUNTER (OUTPATIENT)
Dept: NUCLEAR MEDICINE | Facility: HOSPITAL | Age: 59
End: 2019-11-15

## 2019-11-15 ENCOUNTER — HOSPITAL ENCOUNTER (OUTPATIENT)
Dept: CARDIOLOGY | Facility: HOSPITAL | Age: 59
End: 2019-11-15

## 2019-11-15 ENCOUNTER — HOSPITAL ENCOUNTER (OUTPATIENT)
Dept: NUCLEAR MEDICINE | Facility: HOSPITAL | Age: 59
Discharge: HOME | End: 2019-11-15

## 2019-11-15 DIAGNOSIS — R07.2 PRECORDIAL PAIN: ICD-10-CM

## 2019-11-15 LAB
BH CV ECHO MEAS - % IVS THICK: 19.6 %
BH CV ECHO MEAS - % LVPW THICK: 73.5 %
BH CV ECHO MEAS - ACS: 1.9 CM
BH CV ECHO MEAS - AO MAX PG: 6.9 MMHG
BH CV ECHO MEAS - AO MEAN PG: 3.2 MMHG
BH CV ECHO MEAS - AO ROOT AREA (BSA CORRECTED): 1.3
BH CV ECHO MEAS - AO ROOT AREA: 5.4 CM^2
BH CV ECHO MEAS - AO ROOT DIAM: 2.6 CM
BH CV ECHO MEAS - AO V2 MAX: 131.7 CM/SEC
BH CV ECHO MEAS - AO V2 MEAN: 82.4 CM/SEC
BH CV ECHO MEAS - AO V2 VTI: 25.5 CM
BH CV ECHO MEAS - BSA(HAYCOCK): 2.1 M^2
BH CV ECHO MEAS - BSA: 2 M^2
BH CV ECHO MEAS - BZI_BMI: 35.4 KILOGRAMS/M^2
BH CV ECHO MEAS - BZI_METRIC_HEIGHT: 165.1 CM
BH CV ECHO MEAS - BZI_METRIC_WEIGHT: 96.6 KG
BH CV ECHO MEAS - EDV(CUBED): 103.3 ML
BH CV ECHO MEAS - EDV(MOD-SP4): 49 ML
BH CV ECHO MEAS - EDV(TEICH): 101.9 ML
BH CV ECHO MEAS - EF(CUBED): 60.2 %
BH CV ECHO MEAS - EF(MOD-SP4): 65.3 %
BH CV ECHO MEAS - EF(TEICH): 51.7 %
BH CV ECHO MEAS - ESV(CUBED): 41.2 ML
BH CV ECHO MEAS - ESV(MOD-SP4): 17 ML
BH CV ECHO MEAS - ESV(TEICH): 49.2 ML
BH CV ECHO MEAS - FS: 26.4 %
BH CV ECHO MEAS - IVS/LVPW: 1.3
BH CV ECHO MEAS - IVSD: 1.2 CM
BH CV ECHO MEAS - IVSS: 1.4 CM
BH CV ECHO MEAS - LA DIMENSION: 3.5 CM
BH CV ECHO MEAS - LA/AO: 1.3
BH CV ECHO MEAS - LV DIASTOLIC VOL/BSA (35-75): 24.1 ML/M^2
BH CV ECHO MEAS - LV MASS(C)D: 181 GRAMS
BH CV ECHO MEAS - LV MASS(C)DI: 89.1 GRAMS/M^2
BH CV ECHO MEAS - LV MASS(C)S: 198.5 GRAMS
BH CV ECHO MEAS - LV MASS(C)SI: 97.7 GRAMS/M^2
BH CV ECHO MEAS - LV SYSTOLIC VOL/BSA (12-30): 8.4 ML/M^2
BH CV ECHO MEAS - LVIDD: 4.7 CM
BH CV ECHO MEAS - LVIDS: 3.5 CM
BH CV ECHO MEAS - LVLD AP4: 6.5 CM
BH CV ECHO MEAS - LVLS AP4: 5.9 CM
BH CV ECHO MEAS - LVOT AREA (M): 3.1 CM^2
BH CV ECHO MEAS - LVOT AREA: 3.1 CM^2
BH CV ECHO MEAS - LVOT DIAM: 2 CM
BH CV ECHO MEAS - LVPWD: 0.95 CM
BH CV ECHO MEAS - LVPWS: 1.6 CM
BH CV ECHO MEAS - MV A MAX VEL: 70.6 CM/SEC
BH CV ECHO MEAS - MV E MAX VEL: 73.1 CM/SEC
BH CV ECHO MEAS - MV E/A: 1
BH CV ECHO MEAS - PA ACC SLOPE: 1085 CM/SEC^2
BH CV ECHO MEAS - PA ACC TIME: 0.1 SEC
BH CV ECHO MEAS - PA PR(ACCEL): 36.2 MMHG
BH CV ECHO MEAS - RAP SYSTOLE: 10 MMHG
BH CV ECHO MEAS - RVSP: 37.2 MMHG
BH CV ECHO MEAS - SI(AO): 68.2 ML/M^2
BH CV ECHO MEAS - SI(CUBED): 30.6 ML/M^2
BH CV ECHO MEAS - SI(MOD-SP4): 15.7 ML/M^2
BH CV ECHO MEAS - SI(TEICH): 25.9 ML/M^2
BH CV ECHO MEAS - SV(AO): 138.6 ML
BH CV ECHO MEAS - SV(CUBED): 62.1 ML
BH CV ECHO MEAS - SV(MOD-SP4): 32 ML
BH CV ECHO MEAS - SV(TEICH): 52.7 ML
BH CV ECHO MEAS - TR MAX VEL: 260.9 CM/SEC
MAXIMAL PREDICTED HEART RATE: 160 BPM
STRESS TARGET HR: 136 BPM

## 2019-11-15 PROCEDURE — 93306 TTE W/DOPPLER COMPLETE: CPT

## 2019-11-15 PROCEDURE — 93306 TTE W/DOPPLER COMPLETE: CPT | Performed by: INTERNAL MEDICINE

## 2019-11-20 ENCOUNTER — HOSPITAL ENCOUNTER (OUTPATIENT)
Dept: NUCLEAR MEDICINE | Facility: HOSPITAL | Age: 59
Discharge: HOME | End: 2019-11-20

## 2019-11-20 ENCOUNTER — HOSPITAL ENCOUNTER (OUTPATIENT)
Dept: CARDIOLOGY | Facility: HOSPITAL | Age: 59
Discharge: HOME | End: 2019-11-20

## 2019-11-20 LAB
BH CV NUCLEAR PRIOR STUDY: 3
BH CV STRESS BP STAGE 1: NORMAL
BH CV STRESS BP STAGE 2: NORMAL
BH CV STRESS COMMENTS STAGE 1: NORMAL
BH CV STRESS COMMENTS STAGE 2: NORMAL
BH CV STRESS DOSE REGADENOSON STAGE 1: 0.4
BH CV STRESS DURATION MIN STAGE 1: 0
BH CV STRESS DURATION MIN STAGE 2: 4
BH CV STRESS DURATION SEC STAGE 1: 10
BH CV STRESS DURATION SEC STAGE 2: 0
BH CV STRESS HR STAGE 1: 100
BH CV STRESS HR STAGE 2: 82
BH CV STRESS PROTOCOL 1: NORMAL
BH CV STRESS RECOVERY BP: NORMAL MMHG
BH CV STRESS RECOVERY HR: 82 BPM
BH CV STRESS STAGE 1: 1
BH CV STRESS STAGE 2: 2
MAXIMAL PREDICTED HEART RATE: 160 BPM
PERCENT MAX PREDICTED HR: 62.5 %
STRESS BASELINE BP: NORMAL MMHG
STRESS BASELINE HR: 81 BPM
STRESS PERCENT HR: 74 %
STRESS POST PEAK BP: NORMAL MMHG
STRESS POST PEAK HR: 100 BPM
STRESS TARGET HR: 136 BPM

## 2019-11-20 PROCEDURE — 78452 HT MUSCLE IMAGE SPECT MULT: CPT

## 2019-11-20 PROCEDURE — 25010000002 REGADENOSON 0.4 MG/5ML SOLUTION: Performed by: PHYSICIAN ASSISTANT

## 2019-11-20 PROCEDURE — 0 TECHNETIUM SESTAMIBI: Performed by: PHYSICIAN ASSISTANT

## 2019-11-20 PROCEDURE — 93018 CV STRESS TEST I&R ONLY: CPT | Performed by: INTERNAL MEDICINE

## 2019-11-20 PROCEDURE — A9500 TC99M SESTAMIBI: HCPCS | Performed by: PHYSICIAN ASSISTANT

## 2019-11-20 PROCEDURE — 93017 CV STRESS TEST TRACING ONLY: CPT

## 2019-11-20 PROCEDURE — 78452 HT MUSCLE IMAGE SPECT MULT: CPT | Performed by: INTERNAL MEDICINE

## 2019-11-20 RX ADMIN — REGADENOSON 0.4 MG: 0.08 INJECTION, SOLUTION INTRAVENOUS at 10:03

## 2019-11-20 RX ADMIN — TECHNETIUM TC 99M SESTAMIBI 1 DOSE: 1 INJECTION INTRAVENOUS at 10:03

## 2019-11-20 RX ADMIN — TECHNETIUM TC 99M SESTAMIBI 1 DOSE: 1 INJECTION INTRAVENOUS at 07:55

## 2019-11-26 RX ORDER — DILTIAZEM HYDROCHLORIDE 300 MG/1
300 CAPSULE, COATED, EXTENDED RELEASE ORAL DAILY
Qty: 30 CAPSULE | Refills: 2 | Status: SHIPPED | OUTPATIENT
Start: 2019-11-26 | End: 2020-03-13

## 2020-01-30 ENCOUNTER — OFFICE VISIT (OUTPATIENT)
Dept: CARDIOLOGY | Facility: CLINIC | Age: 60
End: 2020-01-30

## 2020-01-30 VITALS
BODY MASS INDEX: 34.42 KG/M2 | HEIGHT: 65 IN | WEIGHT: 206.6 LBS | SYSTOLIC BLOOD PRESSURE: 125 MMHG | DIASTOLIC BLOOD PRESSURE: 76 MMHG | HEART RATE: 86 BPM | OXYGEN SATURATION: 100 %

## 2020-01-30 DIAGNOSIS — R00.2 PALPITATIONS: ICD-10-CM

## 2020-01-30 DIAGNOSIS — R07.2 PRECORDIAL PAIN: Primary | ICD-10-CM

## 2020-01-30 DIAGNOSIS — I10 ESSENTIAL HYPERTENSION: ICD-10-CM

## 2020-01-30 DIAGNOSIS — E78.5 DYSLIPIDEMIA: ICD-10-CM

## 2020-01-30 PROCEDURE — 99213 OFFICE O/P EST LOW 20 MIN: CPT | Performed by: PHYSICIAN ASSISTANT

## 2020-01-30 RX ORDER — HYDROCHLOROTHIAZIDE 50 MG/1
50 TABLET ORAL DAILY
Refills: 1 | COMMUNITY
Start: 2019-11-05 | End: 2021-05-03

## 2020-01-30 NOTE — PROGRESS NOTES
Juan C Connell MD  Em Marrero  1960  01/30/2020    Patient Active Problem List   Diagnosis   • Palpitations   • Dyslipidemia   • Essential hypertension   • Gastroesophageal reflux disease without esophagitis   • Pulmonary emphysema (CMS/HCC)   • Arthritis   • Hives   • Neck pain   • Bruit of right carotid artery   • Precordial pain       Dear Juan C Connell MD:    Subjective     History of Present Illness:    Chief Complaint   Patient presents with   • Stress test   • Echo       Em Marrero is a pleasant 59 y.o. female with a past medical history significant for essential hypertension, dyslipidemia, COPD, and history of palpitations.  Patient comes in for routine cardiology follow-up.    Patient stress test showed no signs of myocardial ischemia.  Transthoracic echocardiogram revealed normal left ventricular ejection fraction with no significant valvular disease.  Speaking to the patient she does report her symptoms have resolved and it does seem to have been GI causing her symptoms.  She reports she recently changed her diet she is eating smaller meals and avoiding spicy foods and chocolates and her symptoms resolved.  Unfortunately she still is grieving as she recently had her sister passed away from breast cancer.    Allergies   Allergen Reactions   • Coreg [Carvedilol] Shortness Of Breath   • Meloxicam Other (See Comments)     Blister on lip   • Metoprolol Hives   • Atorvastatin Myalgia     leg cramps   • Dexamethasone Rash   • Ketorolac Rash   • Ketorolac Tromethamine Rash   • Levothyroxine Sodium Other (See Comments)     Blister on lip  bust my lips open   :      Current Outpatient Medications:   •  amitriptyline (ELAVIL) 10 MG tablet, Take 1 tablet by mouth Every Night., Disp: 30 tablet, Rfl: 2  •  ANORO ELLIPTA 62.5-25 MCG/INH aerosol powder , INL 1 PUFF PO D, Disp: , Rfl: 2  •  baclofen (LIORESAL) 10 MG tablet, Take 10 mg by mouth 2 (Two) Times a Day. 2 tabs of the night., Disp: , Rfl:   •   esomeprazole (nexIUM) 40 MG capsule, TAKE 1 CAPSULE BY MOUTH DAILY 30 MINUTES BEFORE BREAKFAST, Disp: 90 capsule, Rfl: 5  •  estradiol (ESTRACE) 1 MG tablet, Take 1 mg by mouth Daily., Disp: , Rfl:   •  hydroCHLOROthiazide (HYDRODIURIL) 50 MG tablet, Take 50 mg by mouth Daily., Disp: , Rfl: 1  •  nitroglycerin (NITROSTAT) 0.4 MG SL tablet, 1 under the tongue as needed for angina, may repeat q5mins for up three doses, Disp: 100 tablet, Rfl: 11  •  O2 (OXYGEN), Inhale 2 L/min 1 (one) time, Disp: , Rfl:   •  ondansetron (ZOFRAN) 8 MG tablet, Take 1 tablet by mouth every 8 (eight) hours as needed for nausea or vomiting., Disp: 25 tablet, Rfl: 1  •  ondansetron (ZOFRAN) 8 MG tablet, Take  by mouth Daily., Disp: , Rfl:   •  SITagliptin (JANUVIA) 100 MG tablet, Take 100 mg by mouth Daily., Disp: , Rfl:   •  acetaminophen (TYLENOL) 500 MG tablet, Take 500 mg by mouth Every 6 (Six) Hours As Needed for Mild Pain ., Disp: , Rfl:   •  ALLERGY SERUM INJECTION, Inject  under the skin 1 (One) Time., Disp: , Rfl:   •  amitriptyline (ELAVIL) 10 MG tablet, Take 10 mg by mouth every night., Disp: , Rfl:   •  atorvastatin (LIPITOR) 20 MG tablet, Take 1 tablet by mouth Daily., Disp: 30 tablet, Rfl: 2  •  dilTIAZem CD (CARDIZEM CD) 300 MG 24 hr capsule, Take 1 capsule by mouth Daily., Disp: 30 capsule, Rfl: 2  •  furosemide (LASIX) 20 MG tablet, Take 1 tablet by mouth As Needed (LE edema)., Disp: 30 tablet, Rfl: 6  •  hydrochlorothiazide (MICROZIDE) 12.5 MG capsule, Take 12.5 mg by mouth daily., Disp: , Rfl:   •  ipratropium-albuterol (COMBIVENT RESPIMAT)  MCG/ACT inhaler, Inhale 1 puff 6 (six) times a day., Disp: , Rfl:   •  ipratropium-albuterol (COMBIVENT RESPIMAT)  MCG/ACT inhaler, Inhale 1 puff., Disp: , Rfl:   •  lubiprostone (AMITIZA) 24 MCG capsule, Take 1 capsule by mouth 2 (Two) Times a Day With Meals., Disp: 60 capsule, Rfl: 5  •  medroxyPROGESTERone (PROVERA) 5 MG tablet, Take 5 mg by mouth Daily., Disp: , Rfl:  "  •  montelukast (SINGULAIR) 10 MG tablet, TK 1 T PO QD, Disp: , Rfl: 11  •  vitamin D (ERGOCALCIFEROL) 20477 units capsule capsule, Take 5,000 Units by mouth Daily., Disp: , Rfl:     The following portions of the patient's history were reviewed and updated as appropriate: allergies, current medications, past family history, past medical history, past social history, past surgical history and problem list.    Social History     Tobacco Use   • Smoking status: Current Every Day Smoker     Types: Electronic Cigarette   • Smokeless tobacco: Never Used   Substance Use Topics   • Alcohol use: No   • Drug use: No       Review of Systems   Constitution: Negative for malaise/fatigue.   Cardiovascular: Negative for chest pain, dyspnea on exertion and irregular heartbeat.   Respiratory: Negative for cough and shortness of breath.    Hematologic/Lymphatic: Negative for bleeding problem. Does not bruise/bleed easily.   Gastrointestinal: Negative for nausea and vomiting.   Neurological: Negative for weakness.       Objective   Vitals:    01/30/20 1311   BP: 125/76   Pulse: 86   SpO2: 100%   Weight: 93.7 kg (206 lb 9.6 oz)   Height: 165.1 cm (65\")     Body mass index is 34.38 kg/m².    Physical Exam   Constitutional: She is oriented to person, place, and time. She appears well-developed and well-nourished. No distress.   HENT:   Head: Normocephalic and atraumatic.   Cardiovascular: Normal rate, regular rhythm and normal heart sounds.   Pulmonary/Chest: Effort normal and breath sounds normal. No respiratory distress.   Musculoskeletal: She exhibits no edema.   Neurological: She is alert and oriented to person, place, and time.   Skin: She is not diaphoretic.       Lab Results   Component Value Date     10/26/2019    K 4.0 10/26/2019     10/26/2019    CO2 25.8 10/26/2019    BUN 15 10/26/2019    CREATININE 0.83 10/26/2019    GLUCOSE 142 (H) 10/26/2019    CALCIUM 9.2 10/26/2019    AST 18 10/26/2019    ALT 24 10/26/2019    " ALKPHOS 88 10/26/2019    LABIL2 1.3 (L) 04/13/2016     Lab Results   Component Value Date    CKTOTAL 130 12/23/2014     Lab Results   Component Value Date    WBC 9.57 10/26/2019    HGB 14.1 10/26/2019    HCT 44.5 10/26/2019     10/26/2019     Lab Results   Component Value Date    INR <0.90 11/24/2014    INR 0.94 08/16/2014     Lab Results   Component Value Date    MG 2.0 08/16/2014     Lab Results   Component Value Date    TSH 0.958 08/16/2014    CHLPL 215 (H) 04/13/2016    TRIG 144 04/16/2018    HDL 41 (L) 04/16/2018     (H) 04/16/2018      Lab Results   Component Value Date    BNP 7 11/24/2014       During this visit the following were done:  Labs Reviewed [x]    Labs Ordered []    Radiology Reports Reviewed [x]    Radiology Ordered []    PCP Records Reviewed []    Referring Provider Records Reviewed []    ER Records Reviewed []    Hospital Records Reviewed []    History Obtained From Family []    Radiology Images Reviewed []    Other Reviewed []    Records Requested []       Procedures    Assessment/Plan    Diagnosis Plan   1. Precordial pain  Basic Metabolic Panel    Basic Metabolic Panel   2. Essential hypertension     3. Palpitations     4. Dyslipidemia              Recommendations:  1. I discussed results of stress test and echocardiogram with the patient.  She does seem stable from cardiac standpoint as she is now asymptomatic stress and echo were unremarkable.  She does report she was recently started on 50 mg of HCTZ by her primary care provider she reports she has not had her renal function checked since then so I will order a BMP.  Otherwise can continue current antihypertensives and follow-up with her PCP.    Return in about 6 months (around 7/30/2020).    As always, I appreciate very much the opportunity to participate in the cardiovascular care of your patients.      With Best Regards,    López Bowles PA-C

## 2020-02-24 ENCOUNTER — LAB (OUTPATIENT)
Dept: LAB | Facility: HOSPITAL | Age: 60
End: 2020-02-24

## 2020-02-24 DIAGNOSIS — R07.2 PRECORDIAL PAIN: ICD-10-CM

## 2020-02-24 PROCEDURE — 36415 COLL VENOUS BLD VENIPUNCTURE: CPT

## 2020-02-24 PROCEDURE — 80048 BASIC METABOLIC PNL TOTAL CA: CPT

## 2020-02-25 DIAGNOSIS — R07.2 PRECORDIAL PAIN: Primary | ICD-10-CM

## 2020-02-25 LAB
ANION GAP SERPL CALCULATED.3IONS-SCNC: 15.8 MMOL/L (ref 5–15)
BUN BLD-MCNC: 13 MG/DL (ref 8–23)
BUN/CREAT SERPL: 13.7 (ref 7–25)
CALCIUM SPEC-SCNC: 9.3 MG/DL (ref 8.6–10.5)
CHLORIDE SERPL-SCNC: 98 MMOL/L (ref 98–107)
CO2 SERPL-SCNC: 24.2 MMOL/L (ref 22–29)
CREAT BLD-MCNC: 0.95 MG/DL (ref 0.57–1)
GFR SERPL CREATININE-BSD FRML MDRD: 60 ML/MIN/1.73
GLUCOSE BLD-MCNC: 96 MG/DL (ref 65–99)
POTASSIUM BLD-SCNC: 3.2 MMOL/L (ref 3.5–5.2)
SODIUM BLD-SCNC: 138 MMOL/L (ref 136–145)

## 2020-02-25 RX ORDER — POTASSIUM CHLORIDE 750 MG/1
10 TABLET, FILM COATED, EXTENDED RELEASE ORAL 2 TIMES DAILY
Qty: 180 TABLET | Refills: 3 | Status: SHIPPED | OUTPATIENT
Start: 2020-02-25 | End: 2021-02-22

## 2020-03-12 ENCOUNTER — TELEPHONE (OUTPATIENT)
Dept: CARDIOLOGY | Facility: CLINIC | Age: 60
End: 2020-03-12

## 2020-03-12 NOTE — TELEPHONE ENCOUNTER
Please call patient with the status of her refill Cartia 300mg.  To [facility] pharmacy in [place].

## 2020-03-12 NOTE — TELEPHONE ENCOUNTER
Medication has been sent to López for a approval on refill. Will let her know when he approves this.

## 2020-03-13 ENCOUNTER — HOSPITAL ENCOUNTER (OUTPATIENT)
Dept: MAMMOGRAPHY | Facility: HOSPITAL | Age: 60
Discharge: HOME | End: 2020-03-13
Admitting: INTERNAL MEDICINE

## 2020-03-13 DIAGNOSIS — Z12.31 VISIT FOR SCREENING MAMMOGRAM: ICD-10-CM

## 2020-03-13 PROCEDURE — 77063 BREAST TOMOSYNTHESIS BI: CPT | Performed by: RADIOLOGY

## 2020-03-13 PROCEDURE — 77063 BREAST TOMOSYNTHESIS BI: CPT

## 2020-03-13 PROCEDURE — 77067 SCR MAMMO BI INCL CAD: CPT | Performed by: RADIOLOGY

## 2020-03-13 PROCEDURE — 77067 SCR MAMMO BI INCL CAD: CPT

## 2020-03-13 RX ORDER — DILTIAZEM HYDROCHLORIDE 300 MG/1
CAPSULE, EXTENDED RELEASE ORAL
Qty: 30 CAPSULE | Refills: 2 | Status: SHIPPED | OUTPATIENT
Start: 2020-03-13 | End: 2020-06-08

## 2020-06-08 RX ORDER — DILTIAZEM HYDROCHLORIDE 300 MG/1
CAPSULE, COATED, EXTENDED RELEASE ORAL
Qty: 30 CAPSULE | Refills: 3 | Status: SHIPPED | OUTPATIENT
Start: 2020-06-08 | End: 2020-10-05

## 2020-08-31 ENCOUNTER — LAB (OUTPATIENT)
Dept: LAB | Facility: HOSPITAL | Age: 60
End: 2020-08-31

## 2020-08-31 DIAGNOSIS — R07.2 PRECORDIAL PAIN: ICD-10-CM

## 2020-08-31 PROCEDURE — 36415 COLL VENOUS BLD VENIPUNCTURE: CPT

## 2020-08-31 PROCEDURE — 80048 BASIC METABOLIC PNL TOTAL CA: CPT

## 2020-09-01 ENCOUNTER — TELEPHONE (OUTPATIENT)
Dept: CARDIOLOGY | Facility: CLINIC | Age: 60
End: 2020-09-01

## 2020-09-01 LAB
ANION GAP SERPL CALCULATED.3IONS-SCNC: 9.3 MMOL/L (ref 5–15)
BUN SERPL-MCNC: 13 MG/DL (ref 8–23)
BUN/CREAT SERPL: 15.3 (ref 7–25)
CALCIUM SPEC-SCNC: 9.8 MG/DL (ref 8.6–10.5)
CHLORIDE SERPL-SCNC: 103 MMOL/L (ref 98–107)
CO2 SERPL-SCNC: 25.7 MMOL/L (ref 22–29)
CREAT SERPL-MCNC: 0.85 MG/DL (ref 0.57–1)
GFR SERPL CREATININE-BSD FRML MDRD: 68 ML/MIN/1.73
GLUCOSE SERPL-MCNC: 84 MG/DL (ref 65–99)
POTASSIUM SERPL-SCNC: 4.3 MMOL/L (ref 3.5–5.2)
SODIUM SERPL-SCNC: 138 MMOL/L (ref 136–145)

## 2020-09-03 ENCOUNTER — TELEPHONE (OUTPATIENT)
Dept: CARDIOLOGY | Facility: CLINIC | Age: 60
End: 2020-09-03

## 2020-09-03 NOTE — TELEPHONE ENCOUNTER
Result Notes for Basic Metabolic Panel     Notes recorded by Merissa Stevenson APRN on 9/1/2020 at 9:05 AM EDT  normal     Called pt no answer LM.

## 2020-09-21 ENCOUNTER — OFFICE VISIT (OUTPATIENT)
Dept: CARDIOLOGY | Facility: CLINIC | Age: 60
End: 2020-09-21

## 2020-09-21 VITALS — HEART RATE: 72 BPM | SYSTOLIC BLOOD PRESSURE: 109 MMHG | DIASTOLIC BLOOD PRESSURE: 63 MMHG

## 2020-09-21 DIAGNOSIS — I10 ESSENTIAL HYPERTENSION: Primary | ICD-10-CM

## 2020-09-21 DIAGNOSIS — E78.5 DYSLIPIDEMIA: ICD-10-CM

## 2020-09-21 DIAGNOSIS — R00.2 PALPITATIONS: ICD-10-CM

## 2020-09-21 PROCEDURE — 99441 PR PHYS/QHP TELEPHONE EVALUATION 5-10 MIN: CPT | Performed by: PHYSICIAN ASSISTANT

## 2020-09-21 RX ORDER — DOCUSATE SODIUM 100 MG/1
100 CAPSULE, LIQUID FILLED ORAL NIGHTLY
COMMUNITY
End: 2023-02-17

## 2020-09-21 RX ORDER — OMEPRAZOLE 40 MG/1
40 CAPSULE, DELAYED RELEASE ORAL 2 TIMES DAILY
COMMUNITY
End: 2021-11-16 | Stop reason: ALTCHOICE

## 2020-09-21 NOTE — PROGRESS NOTES
You have chosen to receive care through a telephone visit. Do you consent to use a telephone visit for your medical care today? Yes    I was on the phone with the patient for 6 mintues    Juan C Connell MD  Em Marrero  1960  09/21/2020    Patient Active Problem List   Diagnosis   • Palpitations   • Dyslipidemia   • Essential hypertension   • Gastroesophageal reflux disease without esophagitis   • Pulmonary emphysema (CMS/HCC)   • Arthritis   • Hives   • Neck pain   • Bruit of right carotid artery   • Precordial pain       Dear Juan C Connell MD:    Subjective     History of Present Illness:    Chief Complaint   Patient presents with   • Edema     no change.    • Palpitations     no change.        Em Marrero is a pleasant 59 y.o. female with a past medical history significant for essential hypertension, dyslipidemia, COPD, and history of palpitations.  Patient comes in for routine cardiology follow-up.    Mrs. Marrero reports that she has been doing well from cardiac standpoint.  She does deny any current symptoms which do include chest pains, shortness of breath, palpitations, dizziness, or syncope.  She also reports her blood pressures been well controlled and is 109/63 today.    Allergies   Allergen Reactions   • Coreg [Carvedilol] Shortness Of Breath   • Meloxicam Other (See Comments)     Blister on lip   • Metoprolol Hives   • Atorvastatin Myalgia     leg cramps   • Dexamethasone Rash   • Ketorolac Rash   • Ketorolac Tromethamine Rash   • Levothyroxine Sodium Other (See Comments)     Blister on lip  bust my lips open   :      Current Outpatient Medications:   •  acetaminophen (TYLENOL) 500 MG tablet, Take 500 mg by mouth Every 6 (Six) Hours As Needed for Mild Pain ., Disp: , Rfl:   •  amitriptyline (ELAVIL) 10 MG tablet, Take 1 tablet by mouth Every Night., Disp: 30 tablet, Rfl: 2  •  ANORO ELLIPTA 62.5-25 MCG/INH aerosol powder , INL 1 PUFF PO D, Disp: , Rfl: 2  •  atorvastatin (LIPITOR) 20 MG tablet,  Take 1 tablet by mouth Daily., Disp: 30 tablet, Rfl: 2  •  baclofen (LIORESAL) 10 MG tablet, Take 10 mg by mouth 2 (Two) Times a Day. 2 tabs of the night., Disp: , Rfl:   •  dilTIAZem CD (CARDIZEM CD) 300 MG 24 hr capsule, TAKE 1 CAPSULE BY MOUTH DAILY, Disp: 30 capsule, Rfl: 3  •  docusate sodium (COLACE) 100 MG capsule, Take 100 mg by mouth Every Night. 3 tabs at night., Disp: , Rfl:   •  estradiol (ESTRACE) 1 MG tablet, Take 1 mg by mouth Daily., Disp: , Rfl:   •  furosemide (LASIX) 20 MG tablet, Take 1 tablet by mouth As Needed (LE edema)., Disp: 30 tablet, Rfl: 6  •  hydroCHLOROthiazide (HYDRODIURIL) 50 MG tablet, Take 50 mg by mouth Daily. Takes 1/2 tab., Disp: , Rfl: 1  •  hydrochlorothiazide (MICROZIDE) 12.5 MG capsule, Take 12.5 mg by mouth daily., Disp: , Rfl:   •  ipratropium-albuterol (COMBIVENT RESPIMAT)  MCG/ACT inhaler, Inhale 1 puff., Disp: , Rfl:   •  linaclotide (LINZESS) 290 MCG capsule capsule, Take 290 mcg by mouth Every Morning Before Breakfast., Disp: , Rfl:   •  nitroglycerin (NITROSTAT) 0.4 MG SL tablet, 1 under the tongue as needed for angina, may repeat q5mins for up three doses, Disp: 100 tablet, Rfl: 11  •  O2 (OXYGEN), Inhale 2 L/min 1 (one) time, Disp: , Rfl:   •  omeprazole (priLOSEC) 40 MG capsule, Take 40 mg by mouth 2 (two) times a day., Disp: , Rfl:   •  ondansetron (ZOFRAN) 8 MG tablet, Take 1 tablet by mouth every 8 (eight) hours as needed for nausea or vomiting., Disp: 25 tablet, Rfl: 1  •  potassium chloride (K-DUR) 10 MEQ CR tablet, Take 1 tablet by mouth 2 (Two) Times a Day., Disp: 180 tablet, Rfl: 3  •  SITagliptin (JANUVIA) 100 MG tablet, Take 100 mg by mouth Daily., Disp: , Rfl:   •  vitamin D (ERGOCALCIFEROL) 44625 units capsule capsule, Take 5,000 Units by mouth Daily., Disp: , Rfl:     The following portions of the patient's history were reviewed and updated as appropriate: allergies, current medications, past family history, past medical history, past social  history, past surgical history and problem list.    Social History     Tobacco Use   • Smoking status: Current Every Day Smoker     Types: Electronic Cigarette   • Smokeless tobacco: Never Used   Substance Use Topics   • Alcohol use: No   • Drug use: No       Review of Systems   Constitution: Negative for malaise/fatigue.   Cardiovascular: Negative for chest pain, dyspnea on exertion and irregular heartbeat.   Respiratory: Negative for cough and shortness of breath.    Hematologic/Lymphatic: Negative for bleeding problem. Does not bruise/bleed easily.   Gastrointestinal: Negative for nausea and vomiting.   Neurological: Negative for weakness.       Objective   Vitals:    09/21/20 1049   BP: 109/63   BP Location: Left arm   Patient Position: Sitting   Cuff Size: Adult   Pulse: 72     There is no height or weight on file to calculate BMI.    Physical Exam    Lab Results   Component Value Date     08/31/2020    K 4.3 08/31/2020     08/31/2020    CO2 25.7 08/31/2020    BUN 13 08/31/2020    CREATININE 0.85 08/31/2020    GLUCOSE 84 08/31/2020    CALCIUM 9.8 08/31/2020    AST 18 10/26/2019    ALT 24 10/26/2019    ALKPHOS 88 10/26/2019    LABIL2 1.3 (L) 04/13/2016     Lab Results   Component Value Date    CKTOTAL 130 12/23/2014     Lab Results   Component Value Date    WBC 9.57 10/26/2019    HGB 14.1 10/26/2019    HCT 44.5 10/26/2019     10/26/2019     Lab Results   Component Value Date    INR <0.90 11/24/2014    INR 0.94 08/16/2014     Lab Results   Component Value Date    MG 2.0 08/16/2014     Lab Results   Component Value Date    TSH 0.958 08/16/2014    CHLPL 215 (H) 04/13/2016    TRIG 144 04/16/2018    HDL 41 (L) 04/16/2018     (H) 04/16/2018      Lab Results   Component Value Date    BNP 7 11/24/2014       During this visit the following were done:  Labs Reviewed [x]    Labs Ordered []    Radiology Reports Reviewed [x]    Radiology Ordered []    PCP Records Reviewed []    Referring Provider  Records Reviewed []    ER Records Reviewed []    Hospital Records Reviewed []    History Obtained From Family []    Radiology Images Reviewed []    Other Reviewed []    Records Requested []       Procedures    Assessment/Plan    Diagnosis Plan   1. Essential hypertension     2. Palpitations     3. Dyslipidemia              Recommendations:  1. Overall patient does appear stable on the phone today from cardiac standpoint.  I will continue with diltiazem, Lasix, HCTZ.      Return in about 6 months (around 3/21/2021).    As always, I appreciate very much the opportunity to participate in the cardiovascular care of your patients.      With Best Regards,    López Bowles PA-C

## 2020-10-05 RX ORDER — DILTIAZEM HYDROCHLORIDE 300 MG/1
CAPSULE, COATED, EXTENDED RELEASE ORAL
Qty: 30 CAPSULE | Refills: 3 | Status: SHIPPED | OUTPATIENT
Start: 2020-10-05 | End: 2021-01-27

## 2021-01-27 RX ORDER — DILTIAZEM HYDROCHLORIDE 300 MG/1
CAPSULE, COATED, EXTENDED RELEASE ORAL
Qty: 30 CAPSULE | Refills: 3 | Status: SHIPPED | OUTPATIENT
Start: 2021-01-27 | End: 2021-06-03

## 2021-02-22 DIAGNOSIS — Z23 IMMUNIZATION DUE: ICD-10-CM

## 2021-02-22 RX ORDER — POTASSIUM CHLORIDE 750 MG/1
TABLET, FILM COATED, EXTENDED RELEASE ORAL
Qty: 180 TABLET | Refills: 3 | Status: SHIPPED | OUTPATIENT
Start: 2021-02-22

## 2021-04-01 ENCOUNTER — APPOINTMENT (OUTPATIENT)
Dept: GENERAL RADIOLOGY | Facility: HOSPITAL | Age: 61
End: 2021-04-01

## 2021-04-01 ENCOUNTER — HOSPITAL ENCOUNTER (EMERGENCY)
Facility: HOSPITAL | Age: 61
Discharge: HOME | End: 2021-04-01
Attending: STUDENT IN AN ORGANIZED HEALTH CARE EDUCATION/TRAINING PROGRAM | Admitting: FAMILY MEDICINE

## 2021-04-01 ENCOUNTER — APPOINTMENT (OUTPATIENT)
Dept: CT IMAGING | Facility: HOSPITAL | Age: 61
End: 2021-04-01

## 2021-04-01 VITALS
RESPIRATION RATE: 16 BRPM | WEIGHT: 206 LBS | DIASTOLIC BLOOD PRESSURE: 83 MMHG | OXYGEN SATURATION: 98 % | BODY MASS INDEX: 34.32 KG/M2 | HEART RATE: 74 BPM | HEIGHT: 65 IN | SYSTOLIC BLOOD PRESSURE: 145 MMHG | TEMPERATURE: 97.7 F

## 2021-04-01 DIAGNOSIS — K57.92 DIVERTICULITIS: Primary | ICD-10-CM

## 2021-04-01 LAB
ALBUMIN SERPL-MCNC: 3.87 G/DL (ref 3.5–5.2)
ALBUMIN/GLOB SERPL: 1.1 G/DL
ALP SERPL-CCNC: 86 U/L (ref 39–117)
ALT SERPL W P-5'-P-CCNC: 22 U/L (ref 1–33)
ANION GAP SERPL CALCULATED.3IONS-SCNC: 9.9 MMOL/L (ref 5–15)
AST SERPL-CCNC: 19 U/L (ref 1–32)
BASOPHILS # BLD AUTO: 0.05 10*3/MM3 (ref 0–0.2)
BASOPHILS NFR BLD AUTO: 0.4 % (ref 0–1.5)
BILIRUB SERPL-MCNC: 0.3 MG/DL (ref 0–1.2)
BUN SERPL-MCNC: 15 MG/DL (ref 8–23)
BUN/CREAT SERPL: 17.9 (ref 7–25)
CALCIUM SPEC-SCNC: 9.7 MG/DL (ref 8.6–10.5)
CHLORIDE SERPL-SCNC: 99 MMOL/L (ref 98–107)
CO2 SERPL-SCNC: 30.1 MMOL/L (ref 22–29)
CREAT SERPL-MCNC: 0.84 MG/DL (ref 0.57–1)
D-LACTATE SERPL-SCNC: 1.7 MMOL/L (ref 0.5–2)
DEPRECATED RDW RBC AUTO: 38.6 FL (ref 37–54)
EOSINOPHIL # BLD AUTO: 0.48 10*3/MM3 (ref 0–0.4)
EOSINOPHIL NFR BLD AUTO: 4.1 % (ref 0.3–6.2)
ERYTHROCYTE [DISTWIDTH] IN BLOOD BY AUTOMATED COUNT: 12.9 % (ref 12.3–15.4)
GFR SERPL CREATININE-BSD FRML MDRD: 69 ML/MIN/1.73
GLOBULIN UR ELPH-MCNC: 3.5 GM/DL
GLUCOSE SERPL-MCNC: 141 MG/DL (ref 65–99)
HCT VFR BLD AUTO: 47.9 % (ref 34–46.6)
HGB BLD-MCNC: 15.4 G/DL (ref 12–15.9)
HOLD SPECIMEN: NORMAL
HOLD SPECIMEN: NORMAL
IMM GRANULOCYTES # BLD AUTO: 0.04 10*3/MM3 (ref 0–0.05)
IMM GRANULOCYTES NFR BLD AUTO: 0.3 % (ref 0–0.5)
LIPASE SERPL-CCNC: 23 U/L (ref 13–60)
LYMPHOCYTES # BLD AUTO: 3.06 10*3/MM3 (ref 0.7–3.1)
LYMPHOCYTES NFR BLD AUTO: 26 % (ref 19.6–45.3)
MAGNESIUM SERPL-MCNC: 2.3 MG/DL (ref 1.6–2.4)
MCH RBC QN AUTO: 26.3 PG (ref 26.6–33)
MCHC RBC AUTO-ENTMCNC: 32.2 G/DL (ref 31.5–35.7)
MCV RBC AUTO: 81.9 FL (ref 79–97)
MONOCYTES # BLD AUTO: 0.72 10*3/MM3 (ref 0.1–0.9)
MONOCYTES NFR BLD AUTO: 6.1 % (ref 5–12)
NEUTROPHILS NFR BLD AUTO: 63.1 % (ref 42.7–76)
NEUTROPHILS NFR BLD AUTO: 7.43 10*3/MM3 (ref 1.7–7)
NRBC BLD AUTO-RTO: 0 /100 WBC (ref 0–0.2)
PLATELET # BLD AUTO: 290 10*3/MM3 (ref 140–450)
PMV BLD AUTO: 11.1 FL (ref 6–12)
POTASSIUM SERPL-SCNC: 3.5 MMOL/L (ref 3.5–5.2)
PROT SERPL-MCNC: 7.4 G/DL (ref 6–8.5)
QT INTERVAL: 432 MS
QTC INTERVAL: 488 MS
RBC # BLD AUTO: 5.85 10*6/MM3 (ref 3.77–5.28)
SODIUM SERPL-SCNC: 139 MMOL/L (ref 136–145)
TSH SERPL DL<=0.05 MIU/L-ACNC: 1.77 UIU/ML (ref 0.27–4.2)
WBC # BLD AUTO: 11.78 10*3/MM3 (ref 3.4–10.8)
WHOLE BLOOD HOLD SPECIMEN: NORMAL
WHOLE BLOOD HOLD SPECIMEN: NORMAL

## 2021-04-01 PROCEDURE — 87040 BLOOD CULTURE FOR BACTERIA: CPT | Performed by: FAMILY MEDICINE

## 2021-04-01 PROCEDURE — 85025 COMPLETE CBC W/AUTO DIFF WBC: CPT | Performed by: STUDENT IN AN ORGANIZED HEALTH CARE EDUCATION/TRAINING PROGRAM

## 2021-04-01 PROCEDURE — 74177 CT ABD & PELVIS W/CONTRAST: CPT

## 2021-04-01 PROCEDURE — 71045 X-RAY EXAM CHEST 1 VIEW: CPT

## 2021-04-01 PROCEDURE — 83690 ASSAY OF LIPASE: CPT | Performed by: STUDENT IN AN ORGANIZED HEALTH CARE EDUCATION/TRAINING PROGRAM

## 2021-04-01 PROCEDURE — 93010 ELECTROCARDIOGRAM REPORT: CPT | Performed by: INTERNAL MEDICINE

## 2021-04-01 PROCEDURE — 83605 ASSAY OF LACTIC ACID: CPT | Performed by: STUDENT IN AN ORGANIZED HEALTH CARE EDUCATION/TRAINING PROGRAM

## 2021-04-01 PROCEDURE — 74018 RADEX ABDOMEN 1 VIEW: CPT

## 2021-04-01 PROCEDURE — 74177 CT ABD & PELVIS W/CONTRAST: CPT | Performed by: RADIOLOGY

## 2021-04-01 PROCEDURE — 80053 COMPREHEN METABOLIC PANEL: CPT | Performed by: STUDENT IN AN ORGANIZED HEALTH CARE EDUCATION/TRAINING PROGRAM

## 2021-04-01 PROCEDURE — 74022 RADEX COMPL AQT ABD SERIES: CPT | Performed by: RADIOLOGY

## 2021-04-01 PROCEDURE — 93005 ELECTROCARDIOGRAM TRACING: CPT | Performed by: STUDENT IN AN ORGANIZED HEALTH CARE EDUCATION/TRAINING PROGRAM

## 2021-04-01 PROCEDURE — 99284 EMERGENCY DEPT VISIT MOD MDM: CPT

## 2021-04-01 PROCEDURE — 83735 ASSAY OF MAGNESIUM: CPT | Performed by: STUDENT IN AN ORGANIZED HEALTH CARE EDUCATION/TRAINING PROGRAM

## 2021-04-01 PROCEDURE — 84443 ASSAY THYROID STIM HORMONE: CPT | Performed by: STUDENT IN AN ORGANIZED HEALTH CARE EDUCATION/TRAINING PROGRAM

## 2021-04-01 PROCEDURE — 25010000002 IOPAMIDOL 61 % SOLUTION: Performed by: FAMILY MEDICINE

## 2021-04-01 RX ORDER — BISACODYL 5 MG/1
10 TABLET, DELAYED RELEASE ORAL ONCE
Status: COMPLETED | OUTPATIENT
Start: 2021-04-01 | End: 2021-04-01

## 2021-04-01 RX ORDER — SODIUM CHLORIDE 0.9 % (FLUSH) 0.9 %
10 SYRINGE (ML) INJECTION AS NEEDED
Status: DISCONTINUED | OUTPATIENT
Start: 2021-04-01 | End: 2021-04-01 | Stop reason: HOSPADM

## 2021-04-01 RX ORDER — AMOXICILLIN AND CLAVULANATE POTASSIUM 875; 125 MG/1; MG/1
1 TABLET, FILM COATED ORAL 2 TIMES DAILY
Qty: 20 TABLET | Refills: 0 | Status: SHIPPED | OUTPATIENT
Start: 2021-04-01 | End: 2021-11-16

## 2021-04-01 RX ORDER — HYDROCODONE BITARTRATE AND ACETAMINOPHEN 5; 325 MG/1; MG/1
1 TABLET ORAL EVERY 8 HOURS PRN
Qty: 8 TABLET | Refills: 0 | Status: SHIPPED | OUTPATIENT
Start: 2021-04-01 | End: 2021-11-16

## 2021-04-01 RX ADMIN — GLYCERIN 1 G: 1 SUPPOSITORY RECTAL at 08:14

## 2021-04-01 RX ADMIN — BISACODYL 10 MG: 5 TABLET ORAL at 07:01

## 2021-04-01 RX ADMIN — IOPAMIDOL 100 ML: 612 INJECTION, SOLUTION INTRAVENOUS at 08:17

## 2021-04-01 NOTE — ED NOTES
Pt to ct with rad tech by raúl, will administer medication upon return. Pt reports she did have 1 small bowel movement at approx 0745.     Rebeca Moreno, RN  04/01/21 8154

## 2021-04-01 NOTE — ED PROVIDER NOTES
Subjective   History of Present Illness  This 60-year-old female came to the emergency department [facility] for evaluation of abdominal pain.  She has a history of chronic constipation she ran out of her daily laxative a few days ago she did not have a bowel movement for the past 2 days.  She feels like she is straining to poop but nothing is coming out she is having abdominal cramping she is not having any vomiting.  No fever no chills no chest pain no syncope no palpitations no leg swelling no dysuria.    Review of Systems   Constitutional: Negative.  Negative for chills and fever.   HENT: Negative.    Eyes: Negative.    Respiratory: Negative.    Cardiovascular: Negative.    Gastrointestinal: Positive for abdominal pain and constipation. Negative for nausea and vomiting.   Endocrine: Negative.    Genitourinary: Negative.    Musculoskeletal: Negative.    Skin: Negative.    Allergic/Immunologic: Negative.    Neurological: Negative.    Hematological: Negative.    Psychiatric/Behavioral: Negative.        Past Medical History:   Diagnosis Date   • Dyslipidemia    • GERD (gastroesophageal reflux disease)    • Hypertension    • Hypokalemia    • Palpitations        Allergies   Allergen Reactions   • Coreg [Carvedilol] Shortness Of Breath   • Meloxicam Other (See Comments)     Blister on lip   • Metoprolol Hives   • Atorvastatin Myalgia     leg cramps   • Dexamethasone Rash   • Ketorolac Rash   • Ketorolac Tromethamine Rash   • Levothyroxine Sodium Other (See Comments)     Blister on lip  bust my lips open       Past Surgical History:   Procedure Laterality Date   • BLADDER SURGERY     • BREAST BIOPSY Right 8 YRS AGO   • CHOLECYSTECTOMY     • COLONOSCOPY      Negative   • CORONARY ANGIOPLASTY Left 2000   • HYSTERECTOMY         Family History   Problem Relation Age of Onset   • Breast cancer Sister 66   • Coronary artery disease Sister 50   • Osteoarthritis Sister    • Osteoporosis Sister    • Rheum arthritis Sister    •  Cancer Sister    • Diabetes Sister    • Hypertension Sister    • Osteoarthritis Mother    • Osteoarthritis Father    • Heart disease Father    • Hypertension Father    • Osteoarthritis Brother    • Gout Brother    • Hypertension Brother        Social History     Socioeconomic History   • Marital status: Legally      Spouse name: Not on file   • Number of children: Not on file   • Years of education: Not on file   • Highest education level: Not on file   Tobacco Use   • Smoking status: Current Every Day Smoker     Types: Electronic Cigarette   • Smokeless tobacco: Never Used   Substance and Sexual Activity   • Alcohol use: No   • Drug use: No           Objective   Physical Exam  Vitals and nursing note reviewed. Exam conducted with a chaperone present.   Constitutional:       Appearance: Normal appearance.   HENT:      Head: Normocephalic and atraumatic.      Right Ear: External ear normal.      Left Ear: External ear normal.      Nose: Nose normal.      Mouth/Throat:      Mouth: Mucous membranes are moist.      Pharynx: Oropharynx is clear.   Eyes:      Extraocular Movements: Extraocular movements intact.      Pupils: Pupils are equal, round, and reactive to light.   Cardiovascular:      Rate and Rhythm: Normal rate and regular rhythm.      Pulses: Normal pulses.      Heart sounds: Normal heart sounds.   Pulmonary:      Effort: Pulmonary effort is normal.      Breath sounds: Normal breath sounds.   Abdominal:      General: Abdomen is flat. Bowel sounds are normal.      Palpations: Abdomen is soft.      Comments: Abdomen is nondistended and nontympanic.  Normal active bowel sounds.  No rebound no guarding.  Nayan wall rigidity   Musculoskeletal:         General: Normal range of motion.      Cervical back: Normal range of motion and neck supple.   Skin:     General: Skin is warm and dry.      Capillary Refill: Capillary refill takes less than 2 seconds.   Neurological:      General: No focal deficit present.       Mental Status: She is alert and oriented to person, place, and time.   Psychiatric:         Mood and Affect: Mood normal.         Behavior: Behavior normal.         Thought Content: Thought content normal.         Judgment: Judgment normal.         Procedures           ED Course  ED Course as of Apr 01 0849   Thu Apr 01, 2021   0713 EKG normal sinus rhythm ventricular at 77 parable 174 QRS 86  occasional PVC.    [BB]   0813 Patient's lactic acid TSH magnesium unremarkable.    [BB]   0841 CT Abdomen Pelvis With Contrast    Result Date: 4/1/2021  1.  Long segment wall thickening of the descending colon and sigmoid colon in the setting of diverticulosis. Considerations include acute diverticulitis versus colitis. 2.  No perforation or pneumatosis. 3.  Underlying constipation noted. 4.  Fatty infiltration of liver. 5.  Other nonacute findings as above.  This report was finalized on 4/1/2021 8:32 AM by Dr. Jayme Chirinos MD.      XR Chest 1 View    Result Date: 4/1/2021    Unremarkable exam. No acute cardiopulmonary findings identified.  This report was finalized on 4/1/2021 8:29 AM by Dr. Jayme Chirinos MD.      XR Abdomen KUB    Result Date: 4/1/2021  Moderate constipation. Otherwise unremarkable exam.  This report was finalized on 4/1/2021 8:29 AM by Dr. Jayme Chirinos MD.          [BB]   0847 [facility] [id number]    [BB]   0847 Patient with findings of diverticulitis on CT imaging patient is noted to have a nonsurgical abdomen.  Have discussed plans with patient I discussed warning symptoms with emergency room have discussed    [BB]   0848  need follow-up primary provider patient verbalized understanding.    [BB]      ED Course User Index  [BB] Gonzalez Carrera MD                                           MDM  Number of Diagnoses or Management Options     Amount and/or Complexity of Data Reviewed  Clinical lab tests: ordered and reviewed  Tests in the radiology section of CPT®: ordered and reviewed  Tests  in the medicine section of CPT®: ordered and reviewed  Decide to obtain previous medical records or to obtain history from someone other than the patient: yes  Obtain history from someone other than the patient: yes  Review and summarize past medical records: yes  Independent visualization of images, tracings, or specimens: yes    Risk of Complications, Morbidity, and/or Mortality  Presenting problems: moderate  Diagnostic procedures: moderate  Management options: moderate    Patient Progress  Patient progress: stable      Final diagnoses:   Diverticulitis       ED Disposition  ED Disposition     ED Disposition Condition Comment    Discharge Stable           Renetta Aguirre APRN  [address] [facility] [id number] [phone]    In 2 days           Medication List      New Prescriptions    amoxicillin-clavulanate 875-125 MG per tablet  Commonly known as: AUGMENTIN  Take 1 tablet by mouth 2 (Two) Times a Day.     HYDROcodone-acetaminophen 5-325 MG per tablet  Commonly known as: NORCO  Take 1 tablet by mouth Every 8 (Eight) Hours As Needed for Moderate Pain .           Where to Get Your Medications      These medications were sent to [facility] [address] [place] [address] [phone] [facility] [phone] [address] [address]    Phone: [phone]   · HYDROcodone-acetaminophen 5-325 MG per tablet     You can get these medications from any pharmacy    Bring a paper prescription for each of these medications  · amoxicillin-clavulanate 875-125 MG per tablet          Gonzalez Carrera MD  04/01/21 0849

## 2021-04-01 NOTE — DISCHARGE INSTRUCTIONS
Diverticulitis    Diverticulitis is when small pouches in your colon (large intestine) get infected or swollen. This causes pain in the belly (abdomen) and watery poop (diarrhea).  These pouches are called diverticula. The pouches form in people who have a condition called diverticulosis.  What are the causes?  This condition may be caused by poop (stool) that gets trapped in the pouches in your colon. The poop lets germs (bacteria) grow in the pouches. This causes the infection.  What increases the risk?  You are more likely to get this condition if you have small pouches in your colon. The risk is higher if:  · You are overweight or very overweight (obese).  · You do not exercise enough.  · You drink alcohol.  · You smoke or use products with tobacco in them.  · You eat a diet that has a lot of red meat such as beef, pork, or lamb.  · You eat a diet that does not have enough fiber in it.  · You are older than 40 years of age.  What are the signs or symptoms?  · Pain in the belly. Pain is often on the left side, but it may be in other areas.  · Fever and feeling cold.  · Feeling like you may vomit.  · Vomiting.  · Having cramps.  · Feeling full.  · Changes to how often you poop.  · Blood in your poop.  How is this treated?  Most cases are treated at home by:  · Taking over-the-counter pain medicines.  · Following a clear liquid diet.  · Taking antibiotic medicines.  · Resting.  Very bad cases may need to be treated at a hospital. This may include:  · Not eating or drinking.  · Taking prescription pain medicine.  · Getting antibiotic medicines through an IV tube.  · Getting fluid and food through an IV tube.  · Having surgery.  When you are feeling better, your doctor may tell you to have a test to check your colon (colonoscopy).  Follow these instructions at home:  Medicines  · Take over-the-counter and prescription medicines only as told by your doctor. These include:  ? Antibiotics.  ? Pain medicines.  ? Fiber  pills.  ? Probiotics.  ? Stool softeners.  · If you were prescribed an antibiotic medicine, take it as told by your doctor. Do not stop taking the antibiotic even if you start to feel better.  · Ask your doctor if the medicine prescribed to you requires you to avoid driving or using machinery.  Eating and drinking    · Follow a diet as told by your doctor.  · When you feel better, your doctor may tell you to change your diet. You may need to eat a lot of fiber. Fiber makes it easier to poop (have a bowel movement). Foods with fiber include:  ? Berries.  ? Beans.  ? Lentils.  ? Green vegetables.  · Avoid eating red meat.  General instructions  · Do not use any products that contain nicotine or tobacco, such as cigarettes, e-cigarettes, and chewing tobacco. If you need help quitting, ask your doctor.  · Exercise 3 or more times a week. Try to get 30 minutes each time. Exercise enough to sweat and make your heart beat faster.  · Keep all follow-up visits as told by your doctor. This is important.  Contact a doctor if:  · Your pain does not get better.  · You are not pooping like normal.  Get help right away if:  · Your pain gets worse.  · Your symptoms do not get better.  · Your symptoms get worse very fast.  · You have a fever.  · You vomit more than one time.  · You have poop that is:  ? Bloody.  ? Black.  ? Tarry.  Summary  · This condition happens when small pouches in your colon get infected or swollen.  · Take medicines only as told by your doctor.  · Follow a diet as told by your doctor.  · Keep all follow-up visits as told by your doctor. This is important.  This information is not intended to replace advice given to you by your health care provider. Make sure you discuss any questions you have with your health care provider.  Document Revised: 09/28/2020 Document Reviewed: 09/28/2020  Elsevier Patient Education © 2021 [employer]

## 2021-04-01 NOTE — ED NOTES
Pt reports 2 bowel movements. Pt reports feels better. Pt is alert and oriented, skin pwd, no resp distress.      Rebeca Moreno, UMAIR  04/01/21 [id number]

## 2021-04-02 ENCOUNTER — EPISODE CHANGES (OUTPATIENT)
Dept: CASE MANAGEMENT | Facility: OTHER | Age: 61
End: 2021-04-02

## 2021-04-06 ENCOUNTER — PATIENT OUTREACH (OUTPATIENT)
Dept: CASE MANAGEMENT | Facility: OTHER | Age: 61
End: 2021-04-06

## 2021-04-06 LAB
BACTERIA SPEC AEROBE CULT: NORMAL
BACTERIA SPEC AEROBE CULT: NORMAL

## 2021-04-06 NOTE — OUTREACH NOTE
Patient Outreach Note    Patient was seen in the ED 4/1/21 with complaints of abdominal pain and constipation. Patient was diagnosed with diverticulitis. Patient reports having a long history of GI issues, particularly constipation, and states she knew she had diverticulosis but she has never had an episode of diverticulitis. Patient states she noticed blood in her stool the night she got home from the ED as well as the next morning, but not since then. ACM advised that this was likely due to the constipation as it was bright red. ACM provided education on mgmt of diverticulosis including foods to avoid such as nuts, seeds, popcorn; pt states she does eat a lot of popcorn. ACM discussed constipation mgmt; pt states she takes the strongest dose available of Linzess and still has to take stool softeners to have a BM; pt follows with Dr. Guzman at [facility], states her providers there have assessed her as well as her medication list and it is undetermined as to why her constipation has become so bad. ACM asked about hydration habits; pt reports she does not drink much water. Pt states she drinks a strawberry water drink and coffee. ACM encouraged increased water and fiber intake; pt voiced understanding. AVS instructions reviewed, ACM advised on 24/7 nurse line number, pt voiced understanding.     Radha Guidry, RN  Ambulatory     4/6/2021, 15:46 EDT

## 2021-04-16 ENCOUNTER — LAB (OUTPATIENT)
Dept: LAB | Facility: HOSPITAL | Age: 61
End: 2021-04-16

## 2021-04-16 ENCOUNTER — TRANSCRIBE ORDERS (OUTPATIENT)
Dept: ADMINISTRATIVE | Facility: HOSPITAL | Age: 61
End: 2021-04-16

## 2021-04-16 DIAGNOSIS — I10 ESSENTIAL HYPERTENSION, MALIGNANT: Primary | ICD-10-CM

## 2021-04-16 DIAGNOSIS — E53.8 VITAMIN B 12 DEFICIENCY: ICD-10-CM

## 2021-04-16 DIAGNOSIS — E78.5 HYPERLIPIDEMIA, UNSPECIFIED HYPERLIPIDEMIA TYPE: ICD-10-CM

## 2021-04-16 DIAGNOSIS — E55.9 VITAMIN D DEFICIENCY: ICD-10-CM

## 2021-04-16 DIAGNOSIS — R53.83 TIREDNESS: ICD-10-CM

## 2021-04-16 DIAGNOSIS — I10 ESSENTIAL HYPERTENSION, MALIGNANT: ICD-10-CM

## 2021-04-16 DIAGNOSIS — E11.9 DIABETES MELLITUS WITHOUT COMPLICATION (HCC): ICD-10-CM

## 2021-04-16 PROCEDURE — 84443 ASSAY THYROID STIM HORMONE: CPT

## 2021-04-16 PROCEDURE — 82306 VITAMIN D 25 HYDROXY: CPT

## 2021-04-16 PROCEDURE — 83036 HEMOGLOBIN GLYCOSYLATED A1C: CPT

## 2021-04-16 PROCEDURE — 82043 UR ALBUMIN QUANTITATIVE: CPT

## 2021-04-16 PROCEDURE — 36415 COLL VENOUS BLD VENIPUNCTURE: CPT

## 2021-04-16 PROCEDURE — 80061 LIPID PANEL: CPT

## 2021-04-16 PROCEDURE — 82570 ASSAY OF URINE CREATININE: CPT

## 2021-04-16 PROCEDURE — 84439 ASSAY OF FREE THYROXINE: CPT

## 2021-04-16 PROCEDURE — 82746 ASSAY OF FOLIC ACID SERUM: CPT

## 2021-04-16 PROCEDURE — 82607 VITAMIN B-12: CPT

## 2021-04-16 PROCEDURE — 85025 COMPLETE CBC W/AUTO DIFF WBC: CPT

## 2021-04-16 PROCEDURE — 80053 COMPREHEN METABOLIC PANEL: CPT

## 2021-04-17 LAB
25(OH)D3 SERPL-MCNC: 56.5 NG/ML (ref 30–100)
ALBUMIN SERPL-MCNC: 4 G/DL (ref 3.5–5.2)
ALBUMIN UR-MCNC: <1.2 MG/DL
ALBUMIN/GLOB SERPL: 1.4 G/DL
ALP SERPL-CCNC: 82 U/L (ref 39–117)
ALT SERPL W P-5'-P-CCNC: 23 U/L (ref 1–33)
ANION GAP SERPL CALCULATED.3IONS-SCNC: 10.9 MMOL/L (ref 5–15)
AST SERPL-CCNC: 19 U/L (ref 1–32)
BASOPHILS # BLD AUTO: 0.04 10*3/MM3 (ref 0–0.2)
BASOPHILS NFR BLD AUTO: 0.5 % (ref 0–1.5)
BILIRUB SERPL-MCNC: 0.2 MG/DL (ref 0–1.2)
BUN SERPL-MCNC: 15 MG/DL (ref 8–23)
BUN/CREAT SERPL: 18.5 (ref 7–25)
CALCIUM SPEC-SCNC: 9.4 MG/DL (ref 8.6–10.5)
CHLORIDE SERPL-SCNC: 99 MMOL/L (ref 98–107)
CHOLEST SERPL-MCNC: 249 MG/DL (ref 0–200)
CO2 SERPL-SCNC: 29.1 MMOL/L (ref 22–29)
CREAT SERPL-MCNC: 0.81 MG/DL (ref 0.57–1)
CREAT UR-MCNC: 146.7 MG/DL
DEPRECATED RDW RBC AUTO: 39.1 FL (ref 37–54)
EOSINOPHIL # BLD AUTO: 0.51 10*3/MM3 (ref 0–0.4)
EOSINOPHIL NFR BLD AUTO: 6.2 % (ref 0.3–6.2)
ERYTHROCYTE [DISTWIDTH] IN BLOOD BY AUTOMATED COUNT: 13.1 % (ref 12.3–15.4)
FOLATE SERPL-MCNC: 6.76 NG/ML (ref 4.78–24.2)
GFR SERPL CREATININE-BSD FRML MDRD: 72 ML/MIN/1.73
GLOBULIN UR ELPH-MCNC: 2.8 GM/DL
GLUCOSE SERPL-MCNC: 103 MG/DL (ref 65–99)
HBA1C MFR BLD: 6.1 % (ref 4.8–5.6)
HCT VFR BLD AUTO: 44.5 % (ref 34–46.6)
HDLC SERPL-MCNC: 47 MG/DL (ref 40–60)
HGB BLD-MCNC: 14.6 G/DL (ref 12–15.9)
IMM GRANULOCYTES # BLD AUTO: 0.03 10*3/MM3 (ref 0–0.05)
IMM GRANULOCYTES NFR BLD AUTO: 0.4 % (ref 0–0.5)
LDLC SERPL CALC-MCNC: 177 MG/DL (ref 0–100)
LDLC/HDLC SERPL: 3.71 {RATIO}
LYMPHOCYTES # BLD AUTO: 2.88 10*3/MM3 (ref 0.7–3.1)
LYMPHOCYTES NFR BLD AUTO: 34.7 % (ref 19.6–45.3)
MCH RBC QN AUTO: 26.9 PG (ref 26.6–33)
MCHC RBC AUTO-ENTMCNC: 32.8 G/DL (ref 31.5–35.7)
MCV RBC AUTO: 82 FL (ref 79–97)
MICROALBUMIN/CREAT UR: NORMAL MG/G{CREAT}
MONOCYTES # BLD AUTO: 0.65 10*3/MM3 (ref 0.1–0.9)
MONOCYTES NFR BLD AUTO: 7.8 % (ref 5–12)
NEUTROPHILS NFR BLD AUTO: 4.18 10*3/MM3 (ref 1.7–7)
NEUTROPHILS NFR BLD AUTO: 50.4 % (ref 42.7–76)
NRBC BLD AUTO-RTO: 0 /100 WBC (ref 0–0.2)
PLATELET # BLD AUTO: 275 10*3/MM3 (ref 140–450)
PMV BLD AUTO: 11.5 FL (ref 6–12)
POTASSIUM SERPL-SCNC: 4.2 MMOL/L (ref 3.5–5.2)
PROT SERPL-MCNC: 6.8 G/DL (ref 6–8.5)
RBC # BLD AUTO: 5.43 10*6/MM3 (ref 3.77–5.28)
SODIUM SERPL-SCNC: 139 MMOL/L (ref 136–145)
T4 FREE SERPL-MCNC: 1.05 NG/DL (ref 0.93–1.7)
TRIGL SERPL-MCNC: 137 MG/DL (ref 0–150)
TSH SERPL DL<=0.05 MIU/L-ACNC: 1.38 UIU/ML (ref 0.27–4.2)
VIT B12 BLD-MCNC: 570 PG/ML (ref 211–946)
VLDLC SERPL-MCNC: 25 MG/DL (ref 5–40)
WBC # BLD AUTO: 8.29 10*3/MM3 (ref 3.4–10.8)

## 2021-05-03 ENCOUNTER — OFFICE VISIT (OUTPATIENT)
Dept: CARDIOLOGY | Facility: CLINIC | Age: 61
End: 2021-05-03

## 2021-05-03 VITALS
HEIGHT: 65 IN | BODY MASS INDEX: 34.26 KG/M2 | TEMPERATURE: 97.4 F | WEIGHT: 205.6 LBS | OXYGEN SATURATION: 98 % | DIASTOLIC BLOOD PRESSURE: 75 MMHG | SYSTOLIC BLOOD PRESSURE: 110 MMHG | HEART RATE: 69 BPM

## 2021-05-03 DIAGNOSIS — R00.2 PALPITATIONS: ICD-10-CM

## 2021-05-03 DIAGNOSIS — E78.5 DYSLIPIDEMIA: ICD-10-CM

## 2021-05-03 DIAGNOSIS — I10 ESSENTIAL HYPERTENSION: Primary | ICD-10-CM

## 2021-05-03 PROCEDURE — 99213 OFFICE O/P EST LOW 20 MIN: CPT | Performed by: PHYSICIAN ASSISTANT

## 2021-05-03 NOTE — PROGRESS NOTES
Renetta Aguirre APRN  Em Marrero  1960  05/03/2021    Patient Active Problem List   Diagnosis   • Palpitations   • Dyslipidemia   • Essential hypertension   • Gastroesophageal reflux disease without esophagitis   • Pulmonary emphysema (CMS/HCC)   • Arthritis   • Hives   • Neck pain   • Bruit of right carotid artery   • Precordial pain       Dear Renetta Aguirre APRN:    Subjective     History of Present Illness:    Chief Complaint   Patient presents with   • Med Management   • Syncope     Had diverticulitis   • Palpitations       Em Marrero is a pleasant 60 y.o. female with a past medical history significant for no known coronary artery disease but does have history of essential hypertension, dyslipidemia, COPD, and history of palpitations.  She comes in today for routine cardiology follow-up.    Ms. Marrero was recently in the emergency room on 4/1/2021 where she was found to have acute diverticulitis thankfully this was a mild case and was able to be treated with oral antibiotics and was discharged home.  Today she reports that her symptoms have since resolved.  She denies any chest pains today, shortness of breath, dizziness, or syncope.  She does have chronic palpitations but reports these have not been affecting her ADLs.        Allergies   Allergen Reactions   • Coreg [Carvedilol] Shortness Of Breath   • Meloxicam Other (See Comments)     Blister on lip   • Metoprolol Hives   • Atorvastatin Myalgia     leg cramps   • Dexamethasone Rash   • Ketorolac Rash   • Ketorolac Tromethamine Rash   • Levothyroxine Sodium Other (See Comments)     Blister on lip  bust my lips open   :      Current Outpatient Medications:   •  acetaminophen (TYLENOL) 500 MG tablet, Take 500 mg by mouth Every 6 (Six) Hours As Needed for Mild Pain ., Disp: , Rfl:   •  amitriptyline (ELAVIL) 10 MG tablet, Take 1 tablet by mouth Every Night., Disp: 30 tablet, Rfl: 2  •  ANORO ELLIPTA 62.5-25 MCG/INH aerosol powder , INL 1 PUFF PO D, Disp:  , Rfl: 2  •  baclofen (LIORESAL) 10 MG tablet, Take 10 mg by mouth 2 (Two) Times a Day. 2 tabs of the night., Disp: , Rfl:   •  dilTIAZem CD (CARDIZEM CD) 300 MG 24 hr capsule, TAKE 1 CAPSULE BY MOUTH DAILY, Disp: 30 capsule, Rfl: 3  •  docusate sodium (COLACE) 100 MG capsule, Take 100 mg by mouth Every Night. 3 tabs at night., Disp: , Rfl:   •  furosemide (LASIX) 20 MG tablet, Take 1 tablet by mouth As Needed (LE edema)., Disp: 30 tablet, Rfl: 6  •  hydrochlorothiazide (MICROZIDE) 12.5 MG capsule, Take 12.5 mg by mouth daily., Disp: , Rfl:   •  ipratropium-albuterol (COMBIVENT RESPIMAT)  MCG/ACT inhaler, Inhale 1 puff., Disp: , Rfl:   •  linaclotide (LINZESS) 290 MCG capsule capsule, Take 290 mcg by mouth Every Morning Before Breakfast., Disp: , Rfl:   •  nitroglycerin (NITROSTAT) 0.4 MG SL tablet, 1 under the tongue as needed for angina, may repeat q5mins for up three doses, Disp: 100 tablet, Rfl: 11  •  O2 (OXYGEN), Inhale 2 L/min 1 (one) time, Disp: , Rfl:   •  omeprazole (priLOSEC) 40 MG capsule, Take 40 mg by mouth 2 (two) times a day., Disp: , Rfl:   •  ondansetron (ZOFRAN) 8 MG tablet, Take 1 tablet by mouth every 8 (eight) hours as needed for nausea or vomiting., Disp: 25 tablet, Rfl: 1  •  potassium chloride 10 MEQ CR tablet, TAKE 1 TABLET BY MOUTH TWICE DAILY, Disp: 180 tablet, Rfl: 3  •  SITagliptin (JANUVIA) 100 MG tablet, Take 100 mg by mouth Daily., Disp: , Rfl:   •  vitamin D (ERGOCALCIFEROL) 80796 units capsule capsule, Take 5,000 Units by mouth Daily., Disp: , Rfl:   •  amoxicillin-clavulanate (AUGMENTIN) 875-125 MG per tablet, Take 1 tablet by mouth 2 (Two) Times a Day., Disp: 20 tablet, Rfl: 0  •  estradiol (ESTRACE) 1 MG tablet, Take 1 mg by mouth Daily., Disp: , Rfl:   •  HYDROcodone-acetaminophen (NORCO) 5-325 MG per tablet, Take 1 tablet by mouth Every 8 (Eight) Hours As Needed for Moderate Pain ., Disp: 8 tablet, Rfl: 0    The following portions of the patient's history were reviewed  "and updated as appropriate: allergies, current medications, past family history, past medical history, past social history, past surgical history and problem list.    Social History     Tobacco Use   • Smoking status: Current Every Day Smoker     Types: Electronic Cigarette   • Smokeless tobacco: Never Used   Substance Use Topics   • Alcohol use: No   • Drug use: No         Objective   Vitals:    05/03/21 0928   BP: 110/75   BP Location: Left arm   Patient Position: Sitting   Cuff Size: Adult   Pulse: 69   Temp: 97.4 °F (36.3 °C)   TempSrc: Infrared   SpO2: 98%   Weight: 93.3 kg (205 lb 9.6 oz)   Height: 165.1 cm (65\")     Body mass index is 34.21 kg/m².    Constitutional:       General: Not in acute distress.     Appearance: Healthy appearance. Well-developed and not in distress. Not diaphoretic.   Eyes:      Conjunctiva/sclera: Conjunctivae normal.      Pupils: Pupils are equal, round, and reactive to light.   HENT:      Head: Normocephalic and atraumatic.   Neck:      Vascular: No carotid bruit or JVD.   Pulmonary:      Effort: Pulmonary effort is normal. No respiratory distress.      Breath sounds: Normal breath sounds.   Cardiovascular:      Normal rate. Regular rhythm.   Skin:     General: Skin is cool.   Neurological:      Mental Status: Alert, oriented to person, place, and time and oriented to person, place and time.       Lab Results   Component Value Date     04/16/2021    K 4.2 04/16/2021    CL 99 04/16/2021    CO2 29.1 (H) 04/16/2021    BUN 15 04/16/2021    CREATININE 0.81 04/16/2021    GLUCOSE 103 (H) 04/16/2021    CALCIUM 9.4 04/16/2021    AST 19 04/16/2021    ALT 23 04/16/2021    ALKPHOS 82 04/16/2021    LABIL2 1.3 (L) 04/13/2016     Lab Results   Component Value Date    CKTOTAL 130 12/23/2014     Lab Results   Component Value Date    WBC 8.29 04/16/2021    HGB 14.6 04/16/2021    HCT 44.5 04/16/2021     04/16/2021     Lab Results   Component Value Date    INR <0.90 11/24/2014    INR 0.94 " 08/16/2014     Lab Results   Component Value Date    MG 2.3 04/01/2021     Lab Results   Component Value Date    TSH 1.380 04/16/2021    CHLPL 215 (H) 04/13/2016    TRIG 137 04/16/2021    HDL 47 04/16/2021     (H) 04/16/2021      Lab Results   Component Value Date    BNP 7 11/24/2014       During this visit the following were done:  Labs Reviewed [x]    Labs Ordered []    Radiology Reports Reviewed []    Radiology Ordered []    PCP Records Reviewed []    Referring Provider Records Reviewed []    ER Records Reviewed []    Hospital Records Reviewed []    History Obtained From Family []    Radiology Images Reviewed []    Other Reviewed []    Records Requested []       Procedures    Assessment/Plan    Diagnosis Plan   1. Essential hypertension     2. Dyslipidemia     3. Palpitations              Recommendations:  1. Essential hypertension  1. Well controlled currently. I will continue dilitizem, HCTZ.  2. Dyslipidemia  1. patient has tried and failed lipitor, pravastatin, and crestor. She also thinks she has tried a couple others as well. All of these will cause her to have myalgias and she cannot tolerate them. I did offer to try repaatha which was agreeable to try so I will try to set this up given her elevated LDL.       Return in about 3 months (around 8/3/2021).    As always, I appreciate very much the opportunity to participate in the cardiovascular care of your patients.      With Best Regards,    López Bowles PA-C

## 2021-05-04 ENCOUNTER — DISEASE STATE MANAGEMENT VISIT (OUTPATIENT)
Dept: PHARMACY | Facility: HOSPITAL | Age: 61
End: 2021-05-04

## 2021-05-04 RX ORDER — EVOLOCUMAB 140 MG/ML
140 INJECTION, SOLUTION SUBCUTANEOUS
Qty: 2 ML | Refills: 5 | Status: SHIPPED | OUTPATIENT
Start: 2021-05-04 | End: 2021-06-30

## 2021-05-04 NOTE — PROGRESS NOTES
Pharmacy Note  Medication: Repatha   Dose: 140mg every 2 weeks  Referring Provider: López Bowles  Start Date: TBD  Last Injection:    PA status/Time Period: TBD    Diagnosis Details: Hyperlipidemia    Past Medical History:   Diagnosis Date   • Dyslipidemia    • GERD (gastroesophageal reflux disease)    • Hypertension    • Hypokalemia    • Palpitations      Social History     Socioeconomic History   • Marital status: Legally      Spouse name: Not on file   • Number of children: Not on file   • Years of education: Not on file   • Highest education level: Not on file   Tobacco Use   • Smoking status: Current Every Day Smoker     Types: Electronic Cigarette   • Smokeless tobacco: Never Used   Substance and Sexual Activity   • Alcohol use: No   • Drug use: No     Allergies   Allergen Reactions   • Coreg [Carvedilol] Shortness Of Breath   • Meloxicam Other (See Comments)     Blister on lip   • Metoprolol Hives   • Atorvastatin Myalgia     leg cramps   • Dexamethasone Rash   • Ketorolac Rash   • Ketorolac Tromethamine Rash   • Levothyroxine Sodium Other (See Comments)     Blister on lip  bust my lips open     Current Outpatient Medications   Medication Sig Dispense Refill   • acetaminophen (TYLENOL) 500 MG tablet Take 500 mg by mouth Every 6 (Six) Hours As Needed for Mild Pain .     • amitriptyline (ELAVIL) 10 MG tablet Take 1 tablet by mouth Every Night. 30 tablet 2   • amoxicillin-clavulanate (AUGMENTIN) 875-125 MG per tablet Take 1 tablet by mouth 2 (Two) Times a Day. 20 tablet 0   • ANORO ELLIPTA 62.5-25 MCG/INH aerosol powder  INL 1 PUFF PO D  2   • baclofen (LIORESAL) 10 MG tablet Take 10 mg by mouth 2 (Two) Times a Day. 2 tabs of the night.     • dilTIAZem CD (CARDIZEM CD) 300 MG 24 hr capsule TAKE 1 CAPSULE BY MOUTH DAILY 30 capsule 3   • docusate sodium (COLACE) 100 MG capsule Take 100 mg by mouth Every Night. 3 tabs at night.     • estradiol (ESTRACE) 1 MG tablet Take 1 mg by mouth Daily.     •  Evolocumab (Repatha SureClick) solution auto-injector SureClick injection Inject 1 mL under the skin into the appropriate area as directed Every 14 (Fourteen) Days. 2 mL 5   • furosemide (LASIX) 20 MG tablet Take 1 tablet by mouth As Needed (LE edema). 30 tablet 6   • hydrochlorothiazide (MICROZIDE) 12.5 MG capsule Take 12.5 mg by mouth daily.     • HYDROcodone-acetaminophen (NORCO) 5-325 MG per tablet Take 1 tablet by mouth Every 8 (Eight) Hours As Needed for Moderate Pain . 8 tablet 0   • ipratropium-albuterol (COMBIVENT RESPIMAT)  MCG/ACT inhaler Inhale 1 puff.     • linaclotide (LINZESS) 290 MCG capsule capsule Take 290 mcg by mouth Every Morning Before Breakfast.     • nitroglycerin (NITROSTAT) 0.4 MG SL tablet 1 under the tongue as needed for angina, may repeat q5mins for up three doses 100 tablet 11   • O2 (OXYGEN) Inhale 2 L/min 1 (one) time     • omeprazole (priLOSEC) 40 MG capsule Take 40 mg by mouth 2 (two) times a day.     • ondansetron (ZOFRAN) 8 MG tablet Take 1 tablet by mouth every 8 (eight) hours as needed for nausea or vomiting. 25 tablet 1   • potassium chloride 10 MEQ CR tablet TAKE 1 TABLET BY MOUTH TWICE DAILY 180 tablet 3   • SITagliptin (JANUVIA) 100 MG tablet Take 100 mg by mouth Daily.     • vitamin D (ERGOCALCIFEROL) 07432 units capsule capsule Take 5,000 Units by mouth Daily.       No current facility-administered medications for this visit.       Labs  Lab Results   Component Value Date    CHOL 249 (H) 04/16/2021    CHLPL 215 (H) 04/13/2016    TRIG 137 04/16/2021    HDL 47 04/16/2021     (H) 04/16/2021       Assessment     Drug Dates Notes   Current Lipid-lowering Therapy        Previous Lipid-lowering Therapy Lipitor 7/10/18-5/3/21 myalgias    Pravastatin 1/24/18-7/10/18 myalgias    Crestor 1/10/18-1/24/18 myalgias               Pt was assessed today for PCSK9 inhibitor therapy.  The patient denies any allergies to the medication or latex. Patient has hyperlipidemia and  most recent LDL was 177.  Pt cannot tolerate statins due to myalgias.     Plan    Will order Repatha 140mg every 2 weeks and begin the prior authorization, if applicable.  Will see patient in clinic once prior authorization is obtained for injection training and medication counseling and follow-up with patient as necessary.          Savannah Cross, PharmD  05/04/21  12:07 EDT

## 2021-05-10 ENCOUNTER — DISEASE STATE MANAGEMENT VISIT (OUTPATIENT)
Dept: PHARMACY | Facility: HOSPITAL | Age: 61
End: 2021-05-10

## 2021-05-10 DIAGNOSIS — E78.5 HYPERLIPIDEMIA, UNSPECIFIED HYPERLIPIDEMIA TYPE: Primary | ICD-10-CM

## 2021-05-10 NOTE — PROGRESS NOTES
Pharmacy Note  Medication: Repatha   Dose: 140mg every 2 weeks  Referring Provider: López Bowles  Start Date: TBD  Last Injection:    PA status/Time Period: TBD    Diagnosis Details: Hyperlipidemia    Past Medical History:   Diagnosis Date   • Dyslipidemia    • GERD (gastroesophageal reflux disease)    • Hypertension    • Hypokalemia    • Palpitations      Social History     Socioeconomic History   • Marital status: Legally      Spouse name: Not on file   • Number of children: Not on file   • Years of education: Not on file   • Highest education level: Not on file   Tobacco Use   • Smoking status: Current Every Day Smoker     Types: Electronic Cigarette   • Smokeless tobacco: Never Used   Substance and Sexual Activity   • Alcohol use: No   • Drug use: No     Allergies   Allergen Reactions   • Coreg [Carvedilol] Shortness Of Breath   • Meloxicam Other (See Comments)     Blister on lip   • Metoprolol Hives   • Atorvastatin Myalgia     leg cramps   • Dexamethasone Rash   • Ketorolac Rash   • Ketorolac Tromethamine Rash   • Levothyroxine Sodium Other (See Comments)     Blister on lip  bust my lips open     Current Outpatient Medications   Medication Sig Dispense Refill   • acetaminophen (TYLENOL) 500 MG tablet Take 500 mg by mouth Every 6 (Six) Hours As Needed for Mild Pain .     • amitriptyline (ELAVIL) 10 MG tablet Take 1 tablet by mouth Every Night. 30 tablet 2   • amoxicillin-clavulanate (AUGMENTIN) 875-125 MG per tablet Take 1 tablet by mouth 2 (Two) Times a Day. 20 tablet 0   • ANORO ELLIPTA 62.5-25 MCG/INH aerosol powder  INL 1 PUFF PO D  2   • baclofen (LIORESAL) 10 MG tablet Take 10 mg by mouth 2 (Two) Times a Day. 2 tabs of the night.     • dilTIAZem CD (CARDIZEM CD) 300 MG 24 hr capsule TAKE 1 CAPSULE BY MOUTH DAILY 30 capsule 3   • docusate sodium (COLACE) 100 MG capsule Take 100 mg by mouth Every Night. 3 tabs at night.     • estradiol (ESTRACE) 1 MG tablet Take 1 mg by mouth Daily.     •  Evolocumab (Repatha SureClick) solution auto-injector SureClick injection Inject 1 mL under the skin into the appropriate area as directed Every 14 (Fourteen) Days. 2 mL 5   • furosemide (LASIX) 20 MG tablet Take 1 tablet by mouth As Needed (LE edema). 30 tablet 6   • hydrochlorothiazide (MICROZIDE) 12.5 MG capsule Take 12.5 mg by mouth daily.     • HYDROcodone-acetaminophen (NORCO) 5-325 MG per tablet Take 1 tablet by mouth Every 8 (Eight) Hours As Needed for Moderate Pain . 8 tablet 0   • ipratropium-albuterol (COMBIVENT RESPIMAT)  MCG/ACT inhaler Inhale 1 puff.     • linaclotide (LINZESS) 290 MCG capsule capsule Take 290 mcg by mouth Every Morning Before Breakfast.     • nitroglycerin (NITROSTAT) 0.4 MG SL tablet 1 under the tongue as needed for angina, may repeat q5mins for up three doses 100 tablet 11   • O2 (OXYGEN) Inhale 2 L/min 1 (one) time     • omeprazole (priLOSEC) 40 MG capsule Take 40 mg by mouth 2 (two) times a day.     • ondansetron (ZOFRAN) 8 MG tablet Take 1 tablet by mouth every 8 (eight) hours as needed for nausea or vomiting. 25 tablet 1   • potassium chloride 10 MEQ CR tablet TAKE 1 TABLET BY MOUTH TWICE DAILY 180 tablet 3   • SITagliptin (JANUVIA) 100 MG tablet Take 100 mg by mouth Daily.     • vitamin D (ERGOCALCIFEROL) 81595 units capsule capsule Take 5,000 Units by mouth Daily.       No current facility-administered medications for this visit.       Labs  Lab Results   Component Value Date    CHOL 249 (H) 04/16/2021    CHLPL 215 (H) 04/13/2016    TRIG 137 04/16/2021    HDL 47 04/16/2021     (H) 04/16/2021       Assessment     Drug Dates Notes   Current Lipid-lowering Therapy        Previous Lipid-lowering Therapy Lipitor 7/10/18-5/3/21 myalgias    Pravastatin 1/24/18-7/10/18 myalgias    Crestor 1/10/18-1/24/18 myalgias               Pt was assessed today for PCSK9 inhibitor therapy.  The patient denies any allergies to the medication or latex. Patient has hyperlipidemia and  most recent LDL was 177.  Pt cannot tolerate statins due to myalgias.     Plan    Will order Repatha 140mg every 2 weeks and begin the prior authorization, if applicable.  Will see patient in clinic once prior authorization is obtained for injection training and medication counseling and follow-up with patient as necessary.        The patient was provided education and demonstration of proper administration, storage, and common side effects. The patient expressed understanding of information. Patient attempted injection in her abdomen, however, she was unsuccessful. Patient was having a difficult time keeping the autoinjector pushed firm against her skin when she went to hit the gray start button.  Since we were unsure if the autoinjector would still provide an injection, she asked that I go ahead this visit and give the injection. Injection was given successfully in the abdomen. Patient requests to return in 2 weeks to the clinic for 2nd injection and reported that she may attempt in her thigh at that time. Patient was requested to stay on campus at least 15 minutes post-injection. When patient left clinic she reported feeling well and no issues.     When/if patient feels she is comfortable injecting at home, she would like to be placed in the specialty mail out program.  Once patient has made this decision she will need to be placed on mail out program.     Patient was asked to have follow-up Lipid Panel in 4-8 weeks. Order placed.      Staci Arenas. Cassandra, PharmD  05/10/21  10:19 EDT

## 2021-05-24 ENCOUNTER — DISEASE STATE MANAGEMENT VISIT (OUTPATIENT)
Dept: PHARMACY | Facility: HOSPITAL | Age: 61
End: 2021-05-24

## 2021-05-24 NOTE — PROGRESS NOTES
Pharmacy Note  Medication: Repatha   Dose: 140mg every 2 weeks  Referring Provider: López Bowles  Start Date: TBD  Last Injection:  5/24/21 - right thigh   PA status/Time Period: TBD    Diagnosis Details: Hyperlipidemia    Past Medical History:   Diagnosis Date   • Dyslipidemia    • GERD (gastroesophageal reflux disease)    • Hypertension    • Hypokalemia    • Palpitations      Social History     Socioeconomic History   • Marital status: Legally      Spouse name: Not on file   • Number of children: Not on file   • Years of education: Not on file   • Highest education level: Not on file   Tobacco Use   • Smoking status: Current Every Day Smoker     Types: Electronic Cigarette   • Smokeless tobacco: Never Used   Substance and Sexual Activity   • Alcohol use: No   • Drug use: No     Allergies   Allergen Reactions   • Coreg [Carvedilol] Shortness Of Breath   • Meloxicam Other (See Comments)     Blister on lip   • Metoprolol Hives   • Atorvastatin Myalgia     leg cramps   • Dexamethasone Rash   • Ketorolac Rash   • Ketorolac Tromethamine Rash   • Levothyroxine Sodium Other (See Comments)     Blister on lip  bust my lips open     Current Outpatient Medications   Medication Sig Dispense Refill   • acetaminophen (TYLENOL) 500 MG tablet Take 500 mg by mouth Every 6 (Six) Hours As Needed for Mild Pain .     • amitriptyline (ELAVIL) 10 MG tablet Take 1 tablet by mouth Every Night. 30 tablet 2   • amoxicillin-clavulanate (AUGMENTIN) 875-125 MG per tablet Take 1 tablet by mouth 2 (Two) Times a Day. 20 tablet 0   • ANORO ELLIPTA 62.5-25 MCG/INH aerosol powder  INL 1 PUFF PO D  2   • baclofen (LIORESAL) 10 MG tablet Take 10 mg by mouth 2 (Two) Times a Day. 2 tabs of the night.     • dilTIAZem CD (CARDIZEM CD) 300 MG 24 hr capsule TAKE 1 CAPSULE BY MOUTH DAILY 30 capsule 3   • docusate sodium (COLACE) 100 MG capsule Take 100 mg by mouth Every Night. 3 tabs at night.     • estradiol (ESTRACE) 1 MG tablet Take 1 mg by mouth  Daily.     • Evolocumab (Repatha SureClick) solution auto-injector SureClick injection Inject 1 mL under the skin into the appropriate area as directed Every 14 (Fourteen) Days. 2 mL 5   • furosemide (LASIX) 20 MG tablet Take 1 tablet by mouth As Needed (LE edema). 30 tablet 6   • hydrochlorothiazide (MICROZIDE) 12.5 MG capsule Take 12.5 mg by mouth daily.     • HYDROcodone-acetaminophen (NORCO) 5-325 MG per tablet Take 1 tablet by mouth Every 8 (Eight) Hours As Needed for Moderate Pain . 8 tablet 0   • ipratropium-albuterol (COMBIVENT RESPIMAT)  MCG/ACT inhaler Inhale 1 puff.     • linaclotide (LINZESS) 290 MCG capsule capsule Take 290 mcg by mouth Every Morning Before Breakfast.     • nitroglycerin (NITROSTAT) 0.4 MG SL tablet 1 under the tongue as needed for angina, may repeat q5mins for up three doses 100 tablet 11   • O2 (OXYGEN) Inhale 2 L/min 1 (one) time     • omeprazole (priLOSEC) 40 MG capsule Take 40 mg by mouth 2 (two) times a day.     • ondansetron (ZOFRAN) 8 MG tablet Take 1 tablet by mouth every 8 (eight) hours as needed for nausea or vomiting. 25 tablet 1   • potassium chloride 10 MEQ CR tablet TAKE 1 TABLET BY MOUTH TWICE DAILY 180 tablet 3   • SITagliptin (JANUVIA) 100 MG tablet Take 100 mg by mouth Daily.     • vitamin D (ERGOCALCIFEROL) 57142 units capsule capsule Take 5,000 Units by mouth Daily.       No current facility-administered medications for this visit.       Labs  Lab Results   Component Value Date    CHOL 249 (H) 04/16/2021    CHLPL 215 (H) 04/13/2016    TRIG 137 04/16/2021    HDL 47 04/16/2021     (H) 04/16/2021       Assessment     Drug Dates Notes   Current Lipid-lowering Therapy        Previous Lipid-lowering Therapy Lipitor 7/10/18-5/3/21 myalgias    Pravastatin 1/24/18-7/10/18 myalgias    Crestor 1/10/18-1/24/18 myalgias               Pt was assessed today for PCSK9 inhibitor therapy.  The patient denies any allergies to the medication or latex. Patient has  hyperlipidemia and most recent LDL was 177.  Pt cannot tolerate statins due to myalgias.     Plan    Will order Repatha 140mg every 2 weeks and begin the prior authorization, if applicable.  Will see patient in clinic once prior authorization is obtained for injection training and medication counseling and follow-up with patient as necessary.        The patient was provided education and demonstration of proper administration, storage, and common side effects. The patient expressed understanding of information. Patient provided herself the injection in her right thigh and feels confident that she can administer injections at home.  Patient was requested to stay on campus at least 15 minutes post-injection. When patient left clinic she reported feeling well and no issues. Patient denies any issues after first injection 2 weeks ago.     Patient would like to be placed on specialty mail out program. Vicki has notified Jaclyn.     Patient was asked to have follow-up Lipid Panel in 4-8 weeks. Order placed at previous visit. Patient reminded of this at todays visit.       Staci Arenas. Cassandra, Justin  05/24/21  10:40 EDT

## 2021-06-03 RX ORDER — DILTIAZEM HYDROCHLORIDE 300 MG/1
CAPSULE, COATED, EXTENDED RELEASE ORAL
Qty: 30 CAPSULE | Refills: 3 | Status: SHIPPED | OUTPATIENT
Start: 2021-06-03 | End: 2021-10-01

## 2021-06-03 NOTE — PROGRESS NOTES
Specialty Pharmacy Note      Name:  Em Marrero  :  1960  Date:  6/3/2021         Past Medical History:   Diagnosis Date   • Dyslipidemia    • GERD (gastroesophageal reflux disease)    • Hypertension    • Hypokalemia    • Palpitations        Past Surgical History:   Procedure Laterality Date   • BLADDER SURGERY     • BREAST BIOPSY Right 8 YRS AGO   • CHOLECYSTECTOMY     • COLONOSCOPY      Negative   • CORONARY ANGIOPLASTY Left    • HYSTERECTOMY         Social History     Socioeconomic History   • Marital status: Legally      Spouse name: Not on file   • Number of children: Not on file   • Years of education: Not on file   • Highest education level: Not on file   Tobacco Use   • Smoking status: Current Every Day Smoker     Types: Electronic Cigarette   • Smokeless tobacco: Never Used   Substance and Sexual Activity   • Alcohol use: No   • Drug use: No       Family History   Problem Relation Age of Onset   • Breast cancer Sister 66   • Coronary artery disease Sister 50   • Osteoarthritis Sister    • Osteoporosis Sister    • Rheum arthritis Sister    • Cancer Sister    • Diabetes Sister    • Hypertension Sister    • Osteoarthritis Mother    • Osteoarthritis Father    • Heart disease Father    • Hypertension Father    • Osteoarthritis Brother    • Gout Brother    • Hypertension Brother        Allergies   Allergen Reactions   • Coreg [Carvedilol] Shortness Of Breath   • Meloxicam Other (See Comments)     Blister on lip   • Metoprolol Hives   • Atorvastatin Myalgia     leg cramps   • Dexamethasone Rash   • Ketorolac Rash   • Ketorolac Tromethamine Rash   • Levothyroxine Sodium Other (See Comments)     Blister on lip  bust my lips open       Current Outpatient Medications   Medication Sig Dispense Refill   • acetaminophen (TYLENOL) 500 MG tablet Take 500 mg by mouth Every 6 (Six) Hours As Needed for Mild Pain .     • amitriptyline (ELAVIL) 10 MG tablet Take 1 tablet by mouth Every Night. 30  tablet 2   • amoxicillin-clavulanate (AUGMENTIN) 875-125 MG per tablet Take 1 tablet by mouth 2 (Two) Times a Day. 20 tablet 0   • ANORO ELLIPTA 62.5-25 MCG/INH aerosol powder  INL 1 PUFF PO D  2   • baclofen (LIORESAL) 10 MG tablet Take 10 mg by mouth 2 (Two) Times a Day. 2 tabs of the night.     • dilTIAZem CD (CARDIZEM CD) 300 MG 24 hr capsule TAKE 1 CAPSULE BY MOUTH DAILY 30 capsule 3   • docusate sodium (COLACE) 100 MG capsule Take 100 mg by mouth Every Night. 3 tabs at night.     • estradiol (ESTRACE) 1 MG tablet Take 1 mg by mouth Daily.     • Evolocumab (Repatha SureClick) solution auto-injector SureClick injection Inject 1 mL under the skin into the appropriate area as directed Every 14 (Fourteen) Days. 2 mL 5   • furosemide (LASIX) 20 MG tablet Take 1 tablet by mouth As Needed (LE edema). 30 tablet 6   • hydrochlorothiazide (MICROZIDE) 12.5 MG capsule Take 12.5 mg by mouth daily.     • HYDROcodone-acetaminophen (NORCO) 5-325 MG per tablet Take 1 tablet by mouth Every 8 (Eight) Hours As Needed for Moderate Pain . 8 tablet 0   • ipratropium-albuterol (COMBIVENT RESPIMAT)  MCG/ACT inhaler Inhale 1 puff.     • linaclotide (LINZESS) 290 MCG capsule capsule Take 290 mcg by mouth Every Morning Before Breakfast.     • nitroglycerin (NITROSTAT) 0.4 MG SL tablet 1 under the tongue as needed for angina, may repeat q5mins for up three doses 100 tablet 11   • O2 (OXYGEN) Inhale 2 L/min 1 (one) time     • omeprazole (priLOSEC) 40 MG capsule Take 40 mg by mouth 2 (two) times a day.     • ondansetron (ZOFRAN) 8 MG tablet Take 1 tablet by mouth every 8 (eight) hours as needed for nausea or vomiting. 25 tablet 1   • potassium chloride 10 MEQ CR tablet TAKE 1 TABLET BY MOUTH TWICE DAILY 180 tablet 3   • SITagliptin (JANUVIA) 100 MG tablet Take 100 mg by mouth Daily.     • vitamin D (ERGOCALCIFEROL) 09292 units capsule capsule Take 5,000 Units by mouth Daily.       No current facility-administered medications for this  visit.         LABORATORY:    Lab Results   Component Value Date    WBC 8.29 04/16/2021    HGB 14.6 04/16/2021    HCT 44.5 04/16/2021    MCV 82.0 04/16/2021    RDW 13.1 04/16/2021     04/16/2021    NEUTRORELPCT 50.4 04/16/2021    LYMPHORELPCT 34.7 04/16/2021    MONORELPCT 7.8 04/16/2021    EOSRELPCT 6.2 04/16/2021    BASORELPCT 0.5 04/16/2021    NEUTROABS 4.18 04/16/2021    LYMPHSABS 2.88 04/16/2021       Lab Results   Component Value Date     04/16/2021    K 4.2 04/16/2021    CO2 29.1 (H) 04/16/2021    CL 99 04/16/2021    BUN 15 04/16/2021    CREATININE 0.81 04/16/2021    GLUCOSE 103 (H) 04/16/2021    CALCIUM 9.4 04/16/2021    ALKPHOS 82 04/16/2021    AST 19 04/16/2021    ALT 23 04/16/2021    BILITOT 0.2 04/16/2021    ALBUMIN 4.00 04/16/2021    PROTEINTOT 6.8 04/16/2021    MG 2.3 04/01/2021       Lab Results   Component Value Date    URICACID 6.9 (H) 12/23/2014        Imaging Results (Last 24 Hours)     ** No results found for the last 24 hours. **          ASSESSMENT/PLAN:    Patient Update Assessment (new medications, allergies, medical history): No new updates    Medication(s): Repatha    Currently Taking Medication(s): As directed    Effectiveness of Medication: Working as directed    Experiencing Side Effects: None reported    Prior Authorization Status: Approved    Financial Assistance Status: None needed    Any Issues Identified: None identified    Appropriate to Process Prescription(s): Refill due - patient's next dose scheduled for Monday  Counseling Offered: Yes declined    Next Specialty Pharmacy Visit: ~28 days                   Oculoplastic Surgeon Procedure Text (C): After obtaining clear surgical margins the patient was sent to oculoplastics for surgical repair.  The patient understands they will receive post-surgical care and follow-up from the referring physician's office.

## 2021-06-07 ENCOUNTER — SPECIALTY PHARMACY (OUTPATIENT)
Dept: PHARMACY | Facility: HOSPITAL | Age: 61
End: 2021-06-07

## 2021-06-29 ENCOUNTER — SPECIALTY PHARMACY (OUTPATIENT)
Dept: PHARMACY | Facility: HOSPITAL | Age: 61
End: 2021-06-29

## 2021-06-30 ENCOUNTER — DISEASE STATE MANAGEMENT VISIT (OUTPATIENT)
Dept: PHARMACY | Facility: HOSPITAL | Age: 61
End: 2021-06-30

## 2021-06-30 RX ORDER — ALIROCUMAB 75 MG/ML
75 INJECTION, SOLUTION SUBCUTANEOUS
Qty: 2 ML | Refills: 5 | Status: SHIPPED | OUTPATIENT
Start: 2021-06-30 | End: 2021-11-16 | Stop reason: SDUPTHER

## 2021-06-30 NOTE — PROGRESS NOTES
Pharmacy Note  Medication: Repatha   Dose: 140mg every 2 weeks  Referring Provider: López Bowles  Start Date: TBD  Last Injection:  5/24/21 - right thigh   PA status/Time Period: TBD    Diagnosis Details: Hyperlipidemia    Past Medical History:   Diagnosis Date   • Dyslipidemia    • GERD (gastroesophageal reflux disease)    • Hypertension    • Hypokalemia    • Palpitations      Social History     Socioeconomic History   • Marital status: Legally      Spouse name: Not on file   • Number of children: Not on file   • Years of education: Not on file   • Highest education level: Not on file   Tobacco Use   • Smoking status: Current Every Day Smoker     Types: Electronic Cigarette   • Smokeless tobacco: Never Used   Substance and Sexual Activity   • Alcohol use: No   • Drug use: No     Allergies   Allergen Reactions   • Coreg [Carvedilol] Shortness Of Breath   • Meloxicam Other (See Comments)     Blister on lip   • Metoprolol Hives   • Atorvastatin Myalgia     leg cramps   • Dexamethasone Rash   • Ketorolac Rash   • Ketorolac Tromethamine Rash   • Levothyroxine Sodium Other (See Comments)     Blister on lip  bust my lips open     Current Outpatient Medications   Medication Sig Dispense Refill   • acetaminophen (TYLENOL) 500 MG tablet Take 500 mg by mouth Every 6 (Six) Hours As Needed for Mild Pain .     • amitriptyline (ELAVIL) 10 MG tablet Take 1 tablet by mouth Every Night. 30 tablet 2   • amoxicillin-clavulanate (AUGMENTIN) 875-125 MG per tablet Take 1 tablet by mouth 2 (Two) Times a Day. 20 tablet 0   • ANORO ELLIPTA 62.5-25 MCG/INH aerosol powder  INL 1 PUFF PO D  2   • baclofen (LIORESAL) 10 MG tablet Take 10 mg by mouth 2 (Two) Times a Day. 2 tabs of the night.     • dilTIAZem CD (CARDIZEM CD) 300 MG 24 hr capsule TAKE 1 CAPSULE BY MOUTH DAILY 30 capsule 3   • docusate sodium (COLACE) 100 MG capsule Take 100 mg by mouth Every Night. 3 tabs at night.     • estradiol (ESTRACE) 1 MG tablet Take 1 mg by mouth  Daily.     • Evolocumab (Repatha SureClick) solution auto-injector SureClick injection Inject 1 mL under the skin into the appropriate area as directed Every 14 (Fourteen) Days. 2 mL 5   • furosemide (LASIX) 20 MG tablet Take 1 tablet by mouth As Needed (LE edema). 30 tablet 6   • hydrochlorothiazide (MICROZIDE) 12.5 MG capsule Take 12.5 mg by mouth daily.     • HYDROcodone-acetaminophen (NORCO) 5-325 MG per tablet Take 1 tablet by mouth Every 8 (Eight) Hours As Needed for Moderate Pain . 8 tablet 0   • ipratropium-albuterol (COMBIVENT RESPIMAT)  MCG/ACT inhaler Inhale 1 puff.     • linaclotide (LINZESS) 290 MCG capsule capsule Take 290 mcg by mouth Every Morning Before Breakfast.     • nitroglycerin (NITROSTAT) 0.4 MG SL tablet 1 under the tongue as needed for angina, may repeat q5mins for up three doses 100 tablet 11   • O2 (OXYGEN) Inhale 2 L/min 1 (one) time     • omeprazole (priLOSEC) 40 MG capsule Take 40 mg by mouth 2 (two) times a day.     • ondansetron (ZOFRAN) 8 MG tablet Take 1 tablet by mouth every 8 (eight) hours as needed for nausea or vomiting. 25 tablet 1   • potassium chloride 10 MEQ CR tablet TAKE 1 TABLET BY MOUTH TWICE DAILY 180 tablet 3   • SITagliptin (JANUVIA) 100 MG tablet Take 100 mg by mouth Daily.     • vitamin D (ERGOCALCIFEROL) 66981 units capsule capsule Take 5,000 Units by mouth Daily.       No current facility-administered medications for this visit.       Labs  Lab Results   Component Value Date    CHOL 249 (H) 04/16/2021    CHLPL 215 (H) 04/13/2016    TRIG 137 04/16/2021    HDL 47 04/16/2021     (H) 04/16/2021       Assessment     Drug Dates Notes   Current Lipid-lowering Therapy        Previous Lipid-lowering Therapy Lipitor 7/10/18-5/3/21 myalgias    Pravastatin 1/24/18-7/10/18 myalgias    Crestor 1/10/18-1/24/18 myalgias               Pt was assessed today for PCSK9 inhibitor therapy.  The patient denies any allergies to the medication or latex. Patient has  hyperlipidemia and most recent LDL was 177.  Pt cannot tolerate statins due to myalgias.      Plan    Pt's insurance is no longer covering Repatha. Will order Praluent 75 mg every 2 weeks and begin the prior authorization, if applicable.  Once prior authorization is obtained will contact patient for medication counseling and follow-up with as necessary.  Patient would like to be placed on specialty mail out program, Ted wu.     The patient was provided education and demonstration of proper administration, storage, and common side effects. The patient expressed understanding of information. Patient provided herself the injection in her right thigh and feels confident that she can administer injections at home.  Patient was requested to stay on campus at least 15 minutes post-injection. When patient left clinic she reported feeling well and no issues. Patient denies any issues after first injection 2 weeks ago.     Patient was asked to have follow-up Lipid Panel in 4-8 weeks. Order placed at previous visit. Patient reminded of this at todays visit.        Debbie Calzada PharmD  06/30/21  15:07 EDT

## 2021-07-06 NOTE — PROGRESS NOTES
Specialty Pharmacy Note      Name:  Em Marrero  :  1960  Date:  2021         Past Medical History:   Diagnosis Date   • Dyslipidemia    • GERD (gastroesophageal reflux disease)    • Hypertension    • Hypokalemia    • Palpitations        Past Surgical History:   Procedure Laterality Date   • BLADDER SURGERY     • BREAST BIOPSY Right 8 YRS AGO   • CHOLECYSTECTOMY     • COLONOSCOPY      Negative   • CORONARY ANGIOPLASTY Left    • HYSTERECTOMY         Social History     Socioeconomic History   • Marital status: Legally      Spouse name: Not on file   • Number of children: Not on file   • Years of education: Not on file   • Highest education level: Not on file   Tobacco Use   • Smoking status: Current Every Day Smoker     Types: Electronic Cigarette   • Smokeless tobacco: Never Used   Substance and Sexual Activity   • Alcohol use: No   • Drug use: No       Family History   Problem Relation Age of Onset   • Breast cancer Sister 66   • Coronary artery disease Sister 50   • Osteoarthritis Sister    • Osteoporosis Sister    • Rheum arthritis Sister    • Cancer Sister    • Diabetes Sister    • Hypertension Sister    • Osteoarthritis Mother    • Osteoarthritis Father    • Heart disease Father    • Hypertension Father    • Osteoarthritis Brother    • Gout Brother    • Hypertension Brother        Allergies   Allergen Reactions   • Coreg [Carvedilol] Shortness Of Breath   • Meloxicam Other (See Comments)     Blister on lip   • Metoprolol Hives   • Atorvastatin Myalgia     leg cramps   • Dexamethasone Rash   • Ketorolac Rash   • Ketorolac Tromethamine Rash   • Levothyroxine Sodium Other (See Comments)     Blister on lip  bust my lips open       Current Outpatient Medications   Medication Sig Dispense Refill   • acetaminophen (TYLENOL) 500 MG tablet Take 500 mg by mouth Every 6 (Six) Hours As Needed for Mild Pain .     • Alirocumab (Praluent) 75 MG/ML solution auto-injector Inject 75 mg under the  skin into the appropriate area as directed Every 14 (Fourteen) Days. 2 mL 5   • amitriptyline (ELAVIL) 10 MG tablet Take 1 tablet by mouth Every Night. 30 tablet 2   • amoxicillin-clavulanate (AUGMENTIN) 875-125 MG per tablet Take 1 tablet by mouth 2 (Two) Times a Day. 20 tablet 0   • ANORO ELLIPTA 62.5-25 MCG/INH aerosol powder  INL 1 PUFF PO D  2   • baclofen (LIORESAL) 10 MG tablet Take 10 mg by mouth 2 (Two) Times a Day. 2 tabs of the night.     • dilTIAZem CD (CARDIZEM CD) 300 MG 24 hr capsule TAKE 1 CAPSULE BY MOUTH DAILY 30 capsule 3   • docusate sodium (COLACE) 100 MG capsule Take 100 mg by mouth Every Night. 3 tabs at night.     • estradiol (ESTRACE) 1 MG tablet Take 1 mg by mouth Daily.     • furosemide (LASIX) 20 MG tablet Take 1 tablet by mouth As Needed (LE edema). 30 tablet 6   • hydrochlorothiazide (MICROZIDE) 12.5 MG capsule Take 12.5 mg by mouth daily.     • HYDROcodone-acetaminophen (NORCO) 5-325 MG per tablet Take 1 tablet by mouth Every 8 (Eight) Hours As Needed for Moderate Pain . 8 tablet 0   • ipratropium-albuterol (COMBIVENT RESPIMAT)  MCG/ACT inhaler Inhale 1 puff.     • linaclotide (LINZESS) 290 MCG capsule capsule Take 290 mcg by mouth Every Morning Before Breakfast.     • nitroglycerin (NITROSTAT) 0.4 MG SL tablet 1 under the tongue as needed for angina, may repeat q5mins for up three doses 100 tablet 11   • O2 (OXYGEN) Inhale 2 L/min 1 (one) time     • omeprazole (priLOSEC) 40 MG capsule Take 40 mg by mouth 2 (two) times a day.     • ondansetron (ZOFRAN) 8 MG tablet Take 1 tablet by mouth every 8 (eight) hours as needed for nausea or vomiting. 25 tablet 1   • potassium chloride 10 MEQ CR tablet TAKE 1 TABLET BY MOUTH TWICE DAILY 180 tablet 3   • SITagliptin (JANUVIA) 100 MG tablet Take 100 mg by mouth Daily.     • vitamin D (ERGOCALCIFEROL) 19202 units capsule capsule Take 5,000 Units by mouth Daily.       No current facility-administered medications for this visit.          LABORATORY:    Lab Results   Component Value Date    WBC 8.29 04/16/2021    HGB 14.6 04/16/2021    HCT 44.5 04/16/2021    MCV 82.0 04/16/2021    RDW 13.1 04/16/2021     04/16/2021    NEUTRORELPCT 50.4 04/16/2021    LYMPHORELPCT 34.7 04/16/2021    MONORELPCT 7.8 04/16/2021    EOSRELPCT 6.2 04/16/2021    BASORELPCT 0.5 04/16/2021    NEUTROABS 4.18 04/16/2021    LYMPHSABS 2.88 04/16/2021       Lab Results   Component Value Date     04/16/2021    K 4.2 04/16/2021    CO2 29.1 (H) 04/16/2021    CL 99 04/16/2021    BUN 15 04/16/2021    CREATININE 0.81 04/16/2021    GLUCOSE 103 (H) 04/16/2021    CALCIUM 9.4 04/16/2021    ALKPHOS 82 04/16/2021    AST 19 04/16/2021    ALT 23 04/16/2021    BILITOT 0.2 04/16/2021    ALBUMIN 4.00 04/16/2021    PROTEINTOT 6.8 04/16/2021    MG 2.3 04/01/2021       Lab Results   Component Value Date    URICACID 6.9 (H) 12/23/2014        Imaging Results (Last 24 Hours)     ** No results found for the last 24 hours. **          ASSESSMENT/PLAN:    Patient Update Assessment (new medications, allergies, medical history): Assessed    Medication(s): Praluent    Prior Authorization Status: Approved    Financial Assistance Status: None needed - $0 co-pay    Any Issues Identified: None    Appropriate to Process Prescription(s): First fill    Counseling Offered: Declined, Medication to be administered in Infusion Clinic on Thursday 7/8 at 11:30 am    Next Specialty Pharmacy Visit: ~28 days

## 2021-07-08 ENCOUNTER — DISEASE STATE MANAGEMENT VISIT (OUTPATIENT)
Dept: PHARMACY | Facility: HOSPITAL | Age: 61
End: 2021-07-08

## 2021-07-08 NOTE — PROGRESS NOTES
Pharmacy Note  Medication: Repatha   Dose: 140mg every 2 weeks  Referring Provider: López Bowles  Start Date: TBD  Last Injection:  5/24/21 - right thigh   PA status/Time Period: TBD    Diagnosis Details: Hyperlipidemia    Past Medical History:   Diagnosis Date   • Dyslipidemia    • GERD (gastroesophageal reflux disease)    • Hypertension    • Hypokalemia    • Palpitations      Social History     Socioeconomic History   • Marital status: Legally      Spouse name: Not on file   • Number of children: Not on file   • Years of education: Not on file   • Highest education level: Not on file   Tobacco Use   • Smoking status: Current Every Day Smoker     Types: Electronic Cigarette   • Smokeless tobacco: Never Used   Substance and Sexual Activity   • Alcohol use: No   • Drug use: No     Allergies   Allergen Reactions   • Coreg [Carvedilol] Shortness Of Breath   • Meloxicam Other (See Comments)     Blister on lip   • Metoprolol Hives   • Atorvastatin Myalgia     leg cramps   • Dexamethasone Rash   • Ketorolac Rash   • Ketorolac Tromethamine Rash   • Levothyroxine Sodium Other (See Comments)     Blister on lip  bust my lips open     Current Outpatient Medications   Medication Sig Dispense Refill   • acetaminophen (TYLENOL) 500 MG tablet Take 500 mg by mouth Every 6 (Six) Hours As Needed for Mild Pain .     • Alirocumab (Praluent) 75 MG/ML solution auto-injector Inject 75 mg under the skin into the appropriate area as directed Every 14 (Fourteen) Days. 2 mL 5   • amitriptyline (ELAVIL) 10 MG tablet Take 1 tablet by mouth Every Night. 30 tablet 2   • amoxicillin-clavulanate (AUGMENTIN) 875-125 MG per tablet Take 1 tablet by mouth 2 (Two) Times a Day. 20 tablet 0   • ANORO ELLIPTA 62.5-25 MCG/INH aerosol powder  INL 1 PUFF PO D  2   • baclofen (LIORESAL) 10 MG tablet Take 10 mg by mouth 2 (Two) Times a Day. 2 tabs of the night.     • dilTIAZem CD (CARDIZEM CD) 300 MG 24 hr capsule TAKE 1 CAPSULE BY MOUTH DAILY 30  capsule 3   • docusate sodium (COLACE) 100 MG capsule Take 100 mg by mouth Every Night. 3 tabs at night.     • estradiol (ESTRACE) 1 MG tablet Take 1 mg by mouth Daily.     • furosemide (LASIX) 20 MG tablet Take 1 tablet by mouth As Needed (LE edema). 30 tablet 6   • hydrochlorothiazide (MICROZIDE) 12.5 MG capsule Take 12.5 mg by mouth daily.     • HYDROcodone-acetaminophen (NORCO) 5-325 MG per tablet Take 1 tablet by mouth Every 8 (Eight) Hours As Needed for Moderate Pain . 8 tablet 0   • ipratropium-albuterol (COMBIVENT RESPIMAT)  MCG/ACT inhaler Inhale 1 puff.     • linaclotide (LINZESS) 290 MCG capsule capsule Take 290 mcg by mouth Every Morning Before Breakfast.     • nitroglycerin (NITROSTAT) 0.4 MG SL tablet 1 under the tongue as needed for angina, may repeat q5mins for up three doses 100 tablet 11   • O2 (OXYGEN) Inhale 2 L/min 1 (one) time     • omeprazole (priLOSEC) 40 MG capsule Take 40 mg by mouth 2 (two) times a day.     • ondansetron (ZOFRAN) 8 MG tablet Take 1 tablet by mouth every 8 (eight) hours as needed for nausea or vomiting. 25 tablet 1   • potassium chloride 10 MEQ CR tablet TAKE 1 TABLET BY MOUTH TWICE DAILY 180 tablet 3   • SITagliptin (JANUVIA) 100 MG tablet Take 100 mg by mouth Daily.     • vitamin D (ERGOCALCIFEROL) 09729 units capsule capsule Take 5,000 Units by mouth Daily.       No current facility-administered medications for this visit.       Labs  Lab Results   Component Value Date    CHOL 249 (H) 04/16/2021    CHLPL 215 (H) 04/13/2016    TRIG 137 04/16/2021    HDL 47 04/16/2021     (H) 04/16/2021       Assessment     Drug Dates Notes   Current Lipid-lowering Therapy        Previous Lipid-lowering Therapy Lipitor 7/10/18-5/3/21 myalgias    Pravastatin 1/24/18-7/10/18 myalgias    Crestor 1/10/18-1/24/18 myalgias               Pt was assessed today for PCSK9 inhibitor therapy.  The patient denies any allergies to the medication or latex. Patient has hyperlipidemia and most  recent LDL was 177.  Pt cannot tolerate statins due to myalgias.      Plan    Pt's insurance is no longer covering Repatha. Will order Praluent 75 mg every 2 weeks and begin the prior authorization, if applicable.  Once prior authorization is obtained will contact patient for medication counseling and follow-up with as necessary.  Patient would like to be placed on specialty mail out program, [facility].     Patient came in for first Praluent injection today. The patient was provided education and demonstration of proper administration, storage, and common side effects. The patient expressed understanding of information. Patient provided herself the injection in her right thigh and feels confident that she can administer injections at home.  Patient was requested to stay on campus at least 15 minutes post-injection. When patient left clinic she reported feeling well and no issues. Patient denies any issues after previous Repatha injections.     Patient was asked to have follow-up Lipid Panel in 4-8 weeks. Order placed at previous visit. Patient reminded of this at todays visit.        Debbie Calzada PharmD  07/08/21  12:04 EDT

## 2021-08-03 NOTE — PROGRESS NOTES
Specialty Pharmacy Note      Name:  Em Marrero  :  1960  Date:  8/3/2021         Past Medical History:   Diagnosis Date   • Dyslipidemia    • GERD (gastroesophageal reflux disease)    • Hypertension    • Hypokalemia    • Palpitations        Past Surgical History:   Procedure Laterality Date   • BLADDER SURGERY     • BREAST BIOPSY Right 8 YRS AGO   • CHOLECYSTECTOMY     • COLONOSCOPY      Negative   • CORONARY ANGIOPLASTY Left    • HYSTERECTOMY         Social History     Socioeconomic History   • Marital status: Legally      Spouse name: Not on file   • Number of children: Not on file   • Years of education: Not on file   • Highest education level: Not on file   Tobacco Use   • Smoking status: Current Every Day Smoker     Types: Electronic Cigarette   • Smokeless tobacco: Never Used   Substance and Sexual Activity   • Alcohol use: No   • Drug use: No       Family History   Problem Relation Age of Onset   • Breast cancer Sister 66   • Coronary artery disease Sister 50   • Osteoarthritis Sister    • Osteoporosis Sister    • Rheum arthritis Sister    • Cancer Sister    • Diabetes Sister    • Hypertension Sister    • Osteoarthritis Mother    • Osteoarthritis Father    • Heart disease Father    • Hypertension Father    • Osteoarthritis Brother    • Gout Brother    • Hypertension Brother        Allergies   Allergen Reactions   • Coreg [Carvedilol] Shortness Of Breath   • Meloxicam Other (See Comments)     Blister on lip   • Metoprolol Hives   • Atorvastatin Myalgia     leg cramps   • Dexamethasone Rash   • Ketorolac Rash   • Ketorolac Tromethamine Rash   • Levothyroxine Sodium Other (See Comments)     Blister on lip  bust my lips open       Current Outpatient Medications   Medication Sig Dispense Refill   • acetaminophen (TYLENOL) 500 MG tablet Take 500 mg by mouth Every 6 (Six) Hours As Needed for Mild Pain .     • Alirocumab (Praluent) 75 MG/ML solution auto-injector Inject 75 mg under the  skin into the appropriate area as directed Every 14 (Fourteen) Days. 2 mL 5   • amitriptyline (ELAVIL) 10 MG tablet Take 1 tablet by mouth Every Night. 30 tablet 2   • amoxicillin-clavulanate (AUGMENTIN) 875-125 MG per tablet Take 1 tablet by mouth 2 (Two) Times a Day. 20 tablet 0   • ANORO ELLIPTA 62.5-25 MCG/INH aerosol powder  INL 1 PUFF PO D  2   • baclofen (LIORESAL) 10 MG tablet Take 10 mg by mouth 2 (Two) Times a Day. 2 tabs of the night.     • dilTIAZem CD (CARDIZEM CD) 300 MG 24 hr capsule TAKE 1 CAPSULE BY MOUTH DAILY 30 capsule 3   • docusate sodium (COLACE) 100 MG capsule Take 100 mg by mouth Every Night. 3 tabs at night.     • estradiol (ESTRACE) 1 MG tablet Take 1 mg by mouth Daily.     • furosemide (LASIX) 20 MG tablet Take 1 tablet by mouth As Needed (LE edema). 30 tablet 6   • hydrochlorothiazide (MICROZIDE) 12.5 MG capsule Take 12.5 mg by mouth daily.     • HYDROcodone-acetaminophen (NORCO) 5-325 MG per tablet Take 1 tablet by mouth Every 8 (Eight) Hours As Needed for Moderate Pain . 8 tablet 0   • ipratropium-albuterol (COMBIVENT RESPIMAT)  MCG/ACT inhaler Inhale 1 puff.     • linaclotide (LINZESS) 290 MCG capsule capsule Take 290 mcg by mouth Every Morning Before Breakfast.     • nitroglycerin (NITROSTAT) 0.4 MG SL tablet 1 under the tongue as needed for angina, may repeat q5mins for up three doses 100 tablet 11   • O2 (OXYGEN) Inhale 2 L/min 1 (one) time     • omeprazole (priLOSEC) 40 MG capsule Take 40 mg by mouth 2 (two) times a day.     • ondansetron (ZOFRAN) 8 MG tablet Take 1 tablet by mouth every 8 (eight) hours as needed for nausea or vomiting. 25 tablet 1   • potassium chloride 10 MEQ CR tablet TAKE 1 TABLET BY MOUTH TWICE DAILY 180 tablet 3   • SITagliptin (JANUVIA) 100 MG tablet Take 100 mg by mouth Daily.     • vitamin D (ERGOCALCIFEROL) 96939 units capsule capsule Take 5,000 Units by mouth Daily.       No current facility-administered medications for this visit.          LABORATORY:    Lab Results   Component Value Date    WBC 8.29 04/16/2021    HGB 14.6 04/16/2021    HCT 44.5 04/16/2021    MCV 82.0 04/16/2021    RDW 13.1 04/16/2021     04/16/2021    NEUTRORELPCT 50.4 04/16/2021    LYMPHORELPCT 34.7 04/16/2021    MONORELPCT 7.8 04/16/2021    EOSRELPCT 6.2 04/16/2021    BASORELPCT 0.5 04/16/2021    NEUTROABS 4.18 04/16/2021    LYMPHSABS 2.88 04/16/2021       Lab Results   Component Value Date     04/16/2021    K 4.2 04/16/2021    CO2 29.1 (H) 04/16/2021    CL 99 04/16/2021    BUN 15 04/16/2021    CREATININE 0.81 04/16/2021    GLUCOSE 103 (H) 04/16/2021    CALCIUM 9.4 04/16/2021    ALKPHOS 82 04/16/2021    AST 19 04/16/2021    ALT 23 04/16/2021    BILITOT 0.2 04/16/2021    ALBUMIN 4.00 04/16/2021    PROTEINTOT 6.8 04/16/2021    MG 2.3 04/01/2021       Lab Results   Component Value Date    URICACID 6.9 (H) 12/23/2014        Imaging Results (Last 24 Hours)     ** No results found for the last 24 hours. **          ASSESSMENT/PLAN:    Patient Update Assessment (new medications, allergies, medical history): No updates reported    Medication(s): Praluent    Currently Taking Medication(s): As directed    Effectiveness of Medication: Medication is working as expected    Experiencing Side Effects: None reported    Prior Authorization Status: Approved    Financial Assistance Status: None needed - $0 co-pay    Any Issues Identified: None    Appropriate to Process Prescription(s): Refill due    Counseling Offered: Declined    Next Specialty Pharmacy Visit: ~28 days

## 2021-08-04 ENCOUNTER — SPECIALTY PHARMACY (OUTPATIENT)
Dept: PHARMACY | Facility: HOSPITAL | Age: 61
End: 2021-08-04

## 2021-08-26 ENCOUNTER — APPOINTMENT (OUTPATIENT)
Dept: BONE DENSITY | Facility: HOSPITAL | Age: 61
End: 2021-08-26

## 2021-08-30 ENCOUNTER — SPECIALTY PHARMACY (OUTPATIENT)
Dept: PHARMACY | Facility: HOSPITAL | Age: 61
End: 2021-08-30

## 2021-09-24 NOTE — PROGRESS NOTES
Specialty Pharmacy Note      Name:  Em Marrero  :  1960  Date:  2021         Past Medical History:   Diagnosis Date   • Dyslipidemia    • GERD (gastroesophageal reflux disease)    • Hypertension    • Hypokalemia    • Palpitations        Past Surgical History:   Procedure Laterality Date   • BLADDER SURGERY     • BREAST BIOPSY Right 8 YRS AGO   • CHOLECYSTECTOMY     • COLONOSCOPY      Negative   • CORONARY ANGIOPLASTY Left    • HYSTERECTOMY         Social History     Socioeconomic History   • Marital status: Legally      Spouse name: Not on file   • Number of children: Not on file   • Years of education: Not on file   • Highest education level: Not on file   Tobacco Use   • Smoking status: Current Every Day Smoker     Types: Electronic Cigarette   • Smokeless tobacco: Never Used   Substance and Sexual Activity   • Alcohol use: No   • Drug use: No       Family History   Problem Relation Age of Onset   • Breast cancer Sister 66   • Coronary artery disease Sister 50   • Osteoarthritis Sister    • Osteoporosis Sister    • Rheum arthritis Sister    • Cancer Sister    • Diabetes Sister    • Hypertension Sister    • Osteoarthritis Mother    • Osteoarthritis Father    • Heart disease Father    • Hypertension Father    • Osteoarthritis Brother    • Gout Brother    • Hypertension Brother        Allergies   Allergen Reactions   • Coreg [Carvedilol] Shortness Of Breath   • Meloxicam Other (See Comments)     Blister on lip   • Metoprolol Hives   • Atorvastatin Myalgia     leg cramps   • Dexamethasone Rash   • Ketorolac Rash   • Ketorolac Tromethamine Rash   • Levothyroxine Sodium Other (See Comments)     Blister on lip  bust my lips open       Current Outpatient Medications   Medication Sig Dispense Refill   • acetaminophen (TYLENOL) 500 MG tablet Take 500 mg by mouth Every 6 (Six) Hours As Needed for Mild Pain .     • Alirocumab (Praluent) 75 MG/ML solution auto-injector Inject 75 mg under the  skin into the appropriate area as directed Every 14 (Fourteen) Days. 2 mL 5   • amitriptyline (ELAVIL) 10 MG tablet Take 1 tablet by mouth Every Night. 30 tablet 2   • amoxicillin-clavulanate (AUGMENTIN) 875-125 MG per tablet Take 1 tablet by mouth 2 (Two) Times a Day. 20 tablet 0   • ANORO ELLIPTA 62.5-25 MCG/INH aerosol powder  INL 1 PUFF PO D  2   • baclofen (LIORESAL) 10 MG tablet Take 10 mg by mouth 2 (Two) Times a Day. 2 tabs of the night.     • dilTIAZem CD (CARDIZEM CD) 300 MG 24 hr capsule TAKE 1 CAPSULE BY MOUTH DAILY 30 capsule 3   • docusate sodium (COLACE) 100 MG capsule Take 100 mg by mouth Every Night. 3 tabs at night.     • estradiol (ESTRACE) 1 MG tablet Take 1 mg by mouth Daily.     • furosemide (LASIX) 20 MG tablet Take 1 tablet by mouth As Needed (LE edema). 30 tablet 6   • hydrochlorothiazide (MICROZIDE) 12.5 MG capsule Take 12.5 mg by mouth daily.     • HYDROcodone-acetaminophen (NORCO) 5-325 MG per tablet Take 1 tablet by mouth Every 8 (Eight) Hours As Needed for Moderate Pain . 8 tablet 0   • ipratropium-albuterol (COMBIVENT RESPIMAT)  MCG/ACT inhaler Inhale 1 puff.     • linaclotide (LINZESS) 290 MCG capsule capsule Take 290 mcg by mouth Every Morning Before Breakfast.     • nitroglycerin (NITROSTAT) 0.4 MG SL tablet 1 under the tongue as needed for angina, may repeat q5mins for up three doses 100 tablet 11   • O2 (OXYGEN) Inhale 2 L/min 1 (one) time     • omeprazole (priLOSEC) 40 MG capsule Take 40 mg by mouth 2 (two) times a day.     • ondansetron (ZOFRAN) 8 MG tablet Take 1 tablet by mouth every 8 (eight) hours as needed for nausea or vomiting. 25 tablet 1   • potassium chloride 10 MEQ CR tablet TAKE 1 TABLET BY MOUTH TWICE DAILY 180 tablet 3   • SITagliptin (JANUVIA) 100 MG tablet Take 100 mg by mouth Daily.     • vitamin D (ERGOCALCIFEROL) 17466 units capsule capsule Take 5,000 Units by mouth Daily.       No current facility-administered medications for this visit.          LABORATORY:    Lab Results   Component Value Date    WBC 8.29 04/16/2021    HGB 14.6 04/16/2021    HCT 44.5 04/16/2021    MCV 82.0 04/16/2021    RDW 13.1 04/16/2021     04/16/2021    NEUTRORELPCT 50.4 04/16/2021    LYMPHORELPCT 34.7 04/16/2021    MONORELPCT 7.8 04/16/2021    EOSRELPCT 6.2 04/16/2021    BASORELPCT 0.5 04/16/2021    NEUTROABS 4.18 04/16/2021    LYMPHSABS 2.88 04/16/2021       Lab Results   Component Value Date     04/16/2021    K 4.2 04/16/2021    CO2 29.1 (H) 04/16/2021    CL 99 04/16/2021    BUN 15 04/16/2021    CREATININE 0.81 04/16/2021    GLUCOSE 103 (H) 04/16/2021    CALCIUM 9.4 04/16/2021    ALKPHOS 82 04/16/2021    AST 19 04/16/2021    ALT 23 04/16/2021    BILITOT 0.2 04/16/2021    ALBUMIN 4.00 04/16/2021    PROTEINTOT 6.8 04/16/2021    MG 2.3 04/01/2021       Lab Results   Component Value Date    URICACID 6.9 (H) 12/23/2014        Imaging Results (Last 24 Hours)     ** No results found for the last 24 hours. **          ASSESSMENT/PLAN:    Patient Update Assessment (new medications, allergies, medical history): No updates reported      Medication(s): Praluent 75 Mg/mL    Currently Taking Medication(s): Patient is currently taking medication every 14 days as prescribed.    Effectiveness of Medication: Effective    Experiencing Side Effects: No side effects reported at this time.    Prior Authorization Status: Approved    Financial Assistance Status: None needed - $0 patient co-pay    Any Issues Identified: None    Appropriate to Process Prescription(s): Patient reported next dose is scheduled for Thursday 9/30/2021.  Medication refill scheduled to be delivered to patient's home address on Wednesday 9/29/2021 via [employer].    Counseling Offered: Declined    Next Specialty Pharmacy Visit: ~28 days

## 2021-09-27 ENCOUNTER — SPECIALTY PHARMACY (OUTPATIENT)
Dept: PHARMACY | Facility: HOSPITAL | Age: 61
End: 2021-09-27

## 2021-10-01 RX ORDER — DILTIAZEM HYDROCHLORIDE 300 MG/1
CAPSULE, COATED, EXTENDED RELEASE ORAL
Qty: 30 CAPSULE | Refills: 3 | Status: SHIPPED | OUTPATIENT
Start: 2021-10-01 | End: 2022-01-31

## 2021-10-04 ENCOUNTER — APPOINTMENT (OUTPATIENT)
Dept: BONE DENSITY | Facility: HOSPITAL | Age: 61
End: 2021-10-04

## 2021-10-22 ENCOUNTER — HOSPITAL ENCOUNTER (OUTPATIENT)
Dept: BONE DENSITY | Facility: HOSPITAL | Age: 61
Discharge: HOME | End: 2021-10-22
Admitting: FAMILY MEDICINE

## 2021-10-22 DIAGNOSIS — M85.851 OSTEOPENIA OF BOTH HIPS: ICD-10-CM

## 2021-10-22 DIAGNOSIS — M85.852 OSTEOPENIA OF BOTH HIPS: ICD-10-CM

## 2021-10-22 PROCEDURE — 77080 DXA BONE DENSITY AXIAL: CPT

## 2021-10-22 PROCEDURE — 77080 DXA BONE DENSITY AXIAL: CPT | Performed by: RADIOLOGY

## 2021-10-22 NOTE — PROGRESS NOTES
Specialty Pharmacy Note      Name:  Em Marrero  :  1960  Date:  10/22/2021         Past Medical History:   Diagnosis Date   • Dyslipidemia    • GERD (gastroesophageal reflux disease)    • Hypertension    • Hypokalemia    • Palpitations        Past Surgical History:   Procedure Laterality Date   • BLADDER SURGERY     • BREAST BIOPSY Right 8 YRS AGO   • CHOLECYSTECTOMY     • COLONOSCOPY      Negative   • CORONARY ANGIOPLASTY Left    • HYSTERECTOMY         Social History     Socioeconomic History   • Marital status: Legally    Tobacco Use   • Smoking status: Current Every Day Smoker     Types: Electronic Cigarette   • Smokeless tobacco: Never Used   Substance and Sexual Activity   • Alcohol use: No   • Drug use: No       Family History   Problem Relation Age of Onset   • Breast cancer Sister 66   • Coronary artery disease Sister 50   • Osteoarthritis Sister    • Osteoporosis Sister    • Rheum arthritis Sister    • Cancer Sister    • Diabetes Sister    • Hypertension Sister    • Osteoarthritis Mother    • Osteoarthritis Father    • Heart disease Father    • Hypertension Father    • Osteoarthritis Brother    • Gout Brother    • Hypertension Brother        Allergies   Allergen Reactions   • Coreg [Carvedilol] Shortness Of Breath   • Meloxicam Other (See Comments)     Blister on lip   • Metoprolol Hives   • Atorvastatin Myalgia     leg cramps   • Dexamethasone Rash   • Ketorolac Rash   • Ketorolac Tromethamine Rash   • Levothyroxine Sodium Other (See Comments)     Blister on lip  bust my lips open       Current Outpatient Medications   Medication Sig Dispense Refill   • acetaminophen (TYLENOL) 500 MG tablet Take 500 mg by mouth Every 6 (Six) Hours As Needed for Mild Pain .     • Alirocumab (Praluent) 75 MG/ML solution auto-injector Inject 75 mg under the skin into the appropriate area as directed Every 14 (Fourteen) Days. 2 mL 5   • amitriptyline (ELAVIL) 10 MG tablet Take 1 tablet by mouth  Every Night. 30 tablet 2   • amoxicillin-clavulanate (AUGMENTIN) 875-125 MG per tablet Take 1 tablet by mouth 2 (Two) Times a Day. 20 tablet 0   • ANORO ELLIPTA 62.5-25 MCG/INH aerosol powder  INL 1 PUFF PO D  2   • baclofen (LIORESAL) 10 MG tablet Take 10 mg by mouth 2 (Two) Times a Day. 2 tabs of the night.     • dilTIAZem CD (CARDIZEM CD) 300 MG 24 hr capsule TAKE 1 CAPSULE BY MOUTH DAILY 30 capsule 3   • docusate sodium (COLACE) 100 MG capsule Take 100 mg by mouth Every Night. 3 tabs at night.     • estradiol (ESTRACE) 1 MG tablet Take 1 mg by mouth Daily.     • furosemide (LASIX) 20 MG tablet Take 1 tablet by mouth As Needed (LE edema). 30 tablet 6   • hydrochlorothiazide (MICROZIDE) 12.5 MG capsule Take 12.5 mg by mouth daily.     • HYDROcodone-acetaminophen (NORCO) 5-325 MG per tablet Take 1 tablet by mouth Every 8 (Eight) Hours As Needed for Moderate Pain . 8 tablet 0   • ipratropium-albuterol (COMBIVENT RESPIMAT)  MCG/ACT inhaler Inhale 1 puff.     • linaclotide (LINZESS) 290 MCG capsule capsule Take 290 mcg by mouth Every Morning Before Breakfast.     • nitroglycerin (NITROSTAT) 0.4 MG SL tablet 1 under the tongue as needed for angina, may repeat q5mins for up three doses 100 tablet 11   • O2 (OXYGEN) Inhale 2 L/min 1 (one) time     • omeprazole (priLOSEC) 40 MG capsule Take 40 mg by mouth 2 (two) times a day.     • ondansetron (ZOFRAN) 8 MG tablet Take 1 tablet by mouth every 8 (eight) hours as needed for nausea or vomiting. 25 tablet 1   • potassium chloride 10 MEQ CR tablet TAKE 1 TABLET BY MOUTH TWICE DAILY 180 tablet 3   • SITagliptin (JANUVIA) 100 MG tablet Take 100 mg by mouth Daily.     • vitamin D (ERGOCALCIFEROL) 14168 units capsule capsule Take 5,000 Units by mouth Daily.       No current facility-administered medications for this visit.         LABORATORY:    Lab Results   Component Value Date    WBC 8.29 04/16/2021    HGB 14.6 04/16/2021    HCT 44.5 04/16/2021    MCV 82.0 04/16/2021     RDW 13.1 04/16/2021     04/16/2021    NEUTRORELPCT 50.4 04/16/2021    LYMPHORELPCT 34.7 04/16/2021    MONORELPCT 7.8 04/16/2021    EOSRELPCT 6.2 04/16/2021    BASORELPCT 0.5 04/16/2021    NEUTROABS 4.18 04/16/2021    LYMPHSABS 2.88 04/16/2021       Lab Results   Component Value Date     04/16/2021    K 4.2 04/16/2021    CO2 29.1 (H) 04/16/2021    CL 99 04/16/2021    BUN 15 04/16/2021    CREATININE 0.81 04/16/2021    GLUCOSE 103 (H) 04/16/2021    CALCIUM 9.4 04/16/2021    ALKPHOS 82 04/16/2021    AST 19 04/16/2021    ALT 23 04/16/2021    BILITOT 0.2 04/16/2021    ALBUMIN 4.00 04/16/2021    PROTEINTOT 6.8 04/16/2021    MG 2.3 04/01/2021       Lab Results   Component Value Date    URICACID 6.9 (H) 12/23/2014        Imaging Results (Last 24 Hours)     ** No results found for the last 24 hours. **          ASSESSMENT/PLAN:    Patient Update Assessment (new medications, allergies, medical history): No updates reported at this time.    Medication(s): Praluent 75 mg/mL auto-injector    Currently Taking Medication(s): Patient is currently taking medication as prescribed.    Effectiveness of Medication: Effective    Experiencing Side Effects: No side effects reported at this time.    Prior Authorization Status: Approved    Financial Assistance Status: None needed at this time.    Any Issues Identified: None    Appropriate to Process Prescription(s): Yes, medication refill is due.    Counseling Offered: Declined    Next Specialty Pharmacy Visit: ~28 days

## 2021-10-25 ENCOUNTER — SPECIALTY PHARMACY (OUTPATIENT)
Dept: PHARMACY | Facility: HOSPITAL | Age: 61
End: 2021-10-25

## 2021-11-04 ENCOUNTER — SPECIALTY PHARMACY (OUTPATIENT)
Dept: PHARMACY | Facility: HOSPITAL | Age: 61
End: 2021-11-04

## 2021-11-16 ENCOUNTER — SPECIALTY PHARMACY (OUTPATIENT)
Dept: PHARMACY | Facility: HOSPITAL | Age: 61
End: 2021-11-16

## 2021-11-16 RX ORDER — GLUCOSAMINE/D3/BOSWELLIA SERRA 1500MG-400
5000 TABLET ORAL DAILY
COMMUNITY
End: 2022-09-12

## 2021-11-16 RX ORDER — LOSARTAN POTASSIUM 25 MG/1
25 TABLET ORAL DAILY
COMMUNITY
End: 2023-02-17

## 2021-11-16 RX ORDER — CALCIUM POLYCARBOPHIL 625 MG
625 TABLET ORAL DAILY PRN
COMMUNITY
End: 2022-09-12

## 2021-11-16 RX ORDER — PLECANATIDE 3 MG/1
3 TABLET ORAL DAILY
COMMUNITY

## 2021-11-16 RX ORDER — ALIROCUMAB 75 MG/ML
75 INJECTION, SOLUTION SUBCUTANEOUS
Qty: 6 ML | Refills: 1 | Status: SHIPPED | OUTPATIENT
Start: 2021-11-16 | End: 2022-03-31 | Stop reason: SDUPTHER

## 2021-11-16 RX ORDER — ESOMEPRAZOLE MAGNESIUM 40 MG/1
40 CAPSULE, DELAYED RELEASE ORAL
COMMUNITY
End: 2022-03-31

## 2021-11-16 RX ORDER — CYCLOBENZAPRINE HCL 10 MG
10 TABLET ORAL DAILY
COMMUNITY

## 2021-11-16 NOTE — PROGRESS NOTES
Pharmacy Note  Medication: Repatha   Dose: 140mg every 2 weeks  Referring Provider: López Bowles  Start Date: 5/10/21  Last Injection:  11/11/21  PA status/Time Period: Current    Diagnosis Details: Hyperlipidemia    Past Medical History:   Diagnosis Date   • Dyslipidemia    • GERD (gastroesophageal reflux disease)    • Hypertension    • Hypokalemia    • Palpitations      Social History     Socioeconomic History   • Marital status: Legally    Tobacco Use   • Smoking status: Current Every Day Smoker     Types: Electronic Cigarette   • Smokeless tobacco: Never Used   Substance and Sexual Activity   • Alcohol use: No   • Drug use: No     Allergies   Allergen Reactions   • Coreg [Carvedilol] Shortness Of Breath   • Meloxicam Other (See Comments)     Blister on lip   • Metoprolol Hives   • Atorvastatin Myalgia     leg cramps   • Dexamethasone Rash   • Ketorolac Rash   • Ketorolac Tromethamine Rash   • Levothyroxine Sodium Other (See Comments)     Blister on lip  bust my lips open     Current Outpatient Medications   Medication Sig Dispense Refill   • acetaminophen (TYLENOL) 500 MG tablet Take 500 mg by mouth Every 6 (Six) Hours As Needed for Mild Pain .     • Alirocumab (Praluent) 75 MG/ML solution auto-injector Inject 75 mg under the skin into the appropriate area as directed Every 14 (Fourteen) Days. 6 mL 1   • amitriptyline (ELAVIL) 10 MG tablet Take 1 tablet by mouth Every Night. 30 tablet 2   • ANORO ELLIPTA 62.5-25 MCG/INH aerosol powder  INL 1 PUFF PO D  2   • Biotin 32099 MCG tablet Take 10 mg by mouth Daily.     • cyclobenzaprine (FLEXERIL) 10 MG tablet Take 10 mg by mouth Daily.     • dilTIAZem CD (CARDIZEM CD) 300 MG 24 hr capsule TAKE 1 CAPSULE BY MOUTH DAILY 30 capsule 3   • docusate sodium (COLACE) 100 MG capsule Take 100 mg by mouth Every Night.     • esomeprazole (nexIUM) 40 MG capsule Take 40 mg by mouth Every Morning Before Breakfast.     • furosemide (LASIX) 20 MG tablet Take 1 tablet by  mouth As Needed (LE edema). 30 tablet 6   • ipratropium-albuterol (COMBIVENT RESPIMAT)  MCG/ACT inhaler Inhale 1 puff.     • losartan (COZAAR) 25 MG tablet Take 25 mg by mouth Daily.     • nitroglycerin (NITROSTAT) 0.4 MG SL tablet 1 under the tongue as needed for angina, may repeat q5mins for up three doses 100 tablet 11   • O2 (OXYGEN) Inhale 2 L/min 1 (one) time     • ondansetron (ZOFRAN) 8 MG tablet Take 1 tablet by mouth every 8 (eight) hours as needed for nausea or vomiting. 25 tablet 1   • Plecanatide (Trulance) 3 MG tablet Take 3 mg by mouth Daily.     • polycarbophil (calcium polycarbophil) 625 MG tablet tablet Take 625 mg by mouth Daily.     • SITagliptin (JANUVIA) 100 MG tablet Take 100 mg by mouth Daily.     • vitamin D3 125 MCG (5000 UT) capsule capsule Take 5,000 Units by mouth Daily.     • potassium chloride 10 MEQ CR tablet TAKE 1 TABLET BY MOUTH TWICE DAILY 180 tablet 3     No current facility-administered medications for this visit.       Labs  Lab Results   Component Value Date    CHOL 249 (H) 04/16/2021    CHLPL 215 (H) 04/13/2016    TRIG 137 04/16/2021    HDL 47 04/16/2021     (H) 04/16/2021     LDL 77 on 9/30/21 see media    Assessment     Drug Dates Notes   Current Lipid-lowering Therapy Praluent        Previous Lipid-lowering Therapy Lipitor 7/10/18-5/3/21 myalgias    Pravastatin 1/24/18-7/10/18 myalgias    Crestor 1/10/18-1/24/18 myalgias               Pt was assessed today for PCSK9 inhibitor therapy.  The patient denies any allergies to the medication or latex. Patient has hyperlipidemia and most recent LDL was 77 while on Praluent.  Pt cannot tolerate statins due to myalgias.  Pt denies any issues with the medication and denies any missed doses.     Plan    Continue Praluent 75mg every 2 weeks.  Pt would like 3 months supply if possible. Pt would like to continue mail out with specialty pharmacy.  Will follow-up in 6 months, or sooner if needed.     Savannah Cross,  PharmD  11/16/21  15:57 EST

## 2021-11-19 ENCOUNTER — SPECIALTY PHARMACY (OUTPATIENT)
Dept: PHARMACY | Facility: HOSPITAL | Age: 61
End: 2021-11-19

## 2021-11-19 NOTE — PROGRESS NOTES
Specialty Pharmacy Note      Name:  Em Marrero  :  1960  Date:  2021         Past Medical History:   Diagnosis Date   • Dyslipidemia    • GERD (gastroesophageal reflux disease)    • Hypertension    • Hypokalemia    • Palpitations        Past Surgical History:   Procedure Laterality Date   • BLADDER SURGERY     • BREAST BIOPSY Right 8 YRS AGO   • CHOLECYSTECTOMY     • COLONOSCOPY      Negative   • CORONARY ANGIOPLASTY Left    • HYSTERECTOMY         Social History     Socioeconomic History   • Marital status: Legally    Tobacco Use   • Smoking status: Current Every Day Smoker     Types: Electronic Cigarette   • Smokeless tobacco: Never Used   Substance and Sexual Activity   • Alcohol use: No   • Drug use: No       Family History   Problem Relation Age of Onset   • Breast cancer Sister 66   • Coronary artery disease Sister 50   • Osteoarthritis Sister    • Osteoporosis Sister    • Rheum arthritis Sister    • Cancer Sister    • Diabetes Sister    • Hypertension Sister    • Osteoarthritis Mother    • Osteoarthritis Father    • Heart disease Father    • Hypertension Father    • Osteoarthritis Brother    • Gout Brother    • Hypertension Brother        Allergies   Allergen Reactions   • Coreg [Carvedilol] Shortness Of Breath   • Meloxicam Other (See Comments)     Blister on lip   • Metoprolol Hives   • Atorvastatin Myalgia     leg cramps   • Dexamethasone Rash   • Ketorolac Rash   • Ketorolac Tromethamine Rash   • Levothyroxine Sodium Other (See Comments)     Blister on lip  bust my lips open       Current Outpatient Medications   Medication Sig Dispense Refill   • acetaminophen (TYLENOL) 500 MG tablet Take 500 mg by mouth Every 6 (Six) Hours As Needed for Mild Pain .     • Alirocumab (Praluent) 75 MG/ML solution auto-injector Inject 75 mg under the skin into the appropriate area as directed Every 14 (Fourteen) Days. 6 mL 1   • amitriptyline (ELAVIL) 10 MG tablet Take 1 tablet by mouth  Every Night. 30 tablet 2   • ANORO ELLIPTA 62.5-25 MCG/INH aerosol powder  INL 1 PUFF PO D  2   • Biotin 84915 MCG tablet Take 10 mg by mouth Daily.     • cyclobenzaprine (FLEXERIL) 10 MG tablet Take 10 mg by mouth Daily.     • dilTIAZem CD (CARDIZEM CD) 300 MG 24 hr capsule TAKE 1 CAPSULE BY MOUTH DAILY 30 capsule 3   • docusate sodium (COLACE) 100 MG capsule Take 100 mg by mouth Every Night.     • esomeprazole (nexIUM) 40 MG capsule Take 40 mg by mouth Every Morning Before Breakfast.     • furosemide (LASIX) 20 MG tablet Take 1 tablet by mouth As Needed (LE edema). 30 tablet 6   • ipratropium-albuterol (COMBIVENT RESPIMAT)  MCG/ACT inhaler Inhale 1 puff.     • losartan (COZAAR) 25 MG tablet Take 25 mg by mouth Daily.     • nitroglycerin (NITROSTAT) 0.4 MG SL tablet 1 under the tongue as needed for angina, may repeat q5mins for up three doses 100 tablet 11   • O2 (OXYGEN) Inhale 2 L/min 1 (one) time     • ondansetron (ZOFRAN) 8 MG tablet Take 1 tablet by mouth every 8 (eight) hours as needed for nausea or vomiting. 25 tablet 1   • Plecanatide (Trulance) 3 MG tablet Take 3 mg by mouth Daily.     • polycarbophil (calcium polycarbophil) 625 MG tablet tablet Take 625 mg by mouth Daily.     • potassium chloride 10 MEQ CR tablet TAKE 1 TABLET BY MOUTH TWICE DAILY 180 tablet 3   • SITagliptin (JANUVIA) 100 MG tablet Take 100 mg by mouth Daily.     • vitamin D3 125 MCG (5000 UT) capsule capsule Take 5,000 Units by mouth Daily.       No current facility-administered medications for this visit.         LABORATORY:    Lab Results   Component Value Date    WBC 8.29 04/16/2021    HGB 14.6 04/16/2021    HCT 44.5 04/16/2021    MCV 82.0 04/16/2021    RDW 13.1 04/16/2021     04/16/2021    NEUTRORELPCT 50.4 04/16/2021    LYMPHORELPCT 34.7 04/16/2021    MONORELPCT 7.8 04/16/2021    EOSRELPCT 6.2 04/16/2021    BASORELPCT 0.5 04/16/2021    NEUTROABS 4.18 04/16/2021    LYMPHSABS 2.88 04/16/2021       Lab Results   Component  Value Date     04/16/2021    K 4.2 04/16/2021    CO2 29.1 (H) 04/16/2021    CL 99 04/16/2021    BUN 15 04/16/2021    CREATININE 0.81 04/16/2021    GLUCOSE 103 (H) 04/16/2021    CALCIUM 9.4 04/16/2021    ALKPHOS 82 04/16/2021    AST 19 04/16/2021    ALT 23 04/16/2021    BILITOT 0.2 04/16/2021    ALBUMIN 4.00 04/16/2021    PROTEINTOT 6.8 04/16/2021    MG 2.3 04/01/2021       Lab Results   Component Value Date    URICACID 6.9 (H) 12/23/2014        Imaging Results (Last 24 Hours)     ** No results found for the last 24 hours. **          ASSESSMENT/PLAN:    Patient Update Assessment (new medications, allergies, medical history): No updates reported at this time.    Medication(s): Praluent 75 mg/ml solution auto-injector    Currently Taking Medication(s): Patient is currently taking medication as prescribed.    Effectiveness of Medication: Effective    Experiencing Side Effects: No side effects reported at this time.    Prior Authorization Status: Approved    Financial Assistance Status: None needed at this time. ($0 patient co-pay)    Any Issues Identified: None    Appropriate to Process Prescription(s): Yes, medication refill is due.    Counseling Offered: Declined    Next Specialty Pharmacy Visit: ~84 days                Specialty Pharmacy Refill Coordination Note     Em is a 61 y.o. female contacted today regarding refills of  One specialty medication(s).    Reviewed and verified with patient: Allergies  Meds       Specialty medication(s) and dose(s) confirmed: yes    Refill Questions      Most Recent Value   Changes to allergies? No   Changes to medications? No   New conditions since last clinic visit No   How does patient/caregiver feel medication is working? Very good   Financial problems or insurance changes  No          Delivery Questions      Most Recent Value   Delivery method [employer]   Delivery address correct? Yes   Preferred delivery time? Anytime   Number of medications in delivery 1   Medication  being filled and delivered Repatha   Is there any medication that is due not being filled? No   Supplies needed? No supplies needed   Cooler needed? No   Do any medications need mixed or dated? No   Questions or concerns for the pharmacist? No   Are any medications first time fills? No            Medication Adherence    Any gaps in refill history greater than 2 weeks in the last 3 months: no  Demonstrates understanding of importance of adherence: yes  Informant: patient  Reliability of informant: reliable  Provider-estimated medication adherence level: %  Reasons for non-adherence: no problems identified  Adherence tools used: calendar  Support network for adherence: family member          Follow-up: 84 day(s)     Ted Lisa, Pharmacy Technician  Specialty Pharmacy Technician

## 2021-12-07 ENCOUNTER — OFFICE VISIT (OUTPATIENT)
Dept: CARDIOLOGY | Facility: CLINIC | Age: 61
End: 2021-12-07

## 2021-12-07 VITALS
DIASTOLIC BLOOD PRESSURE: 75 MMHG | BODY MASS INDEX: 35.22 KG/M2 | TEMPERATURE: 97.1 F | HEIGHT: 65 IN | SYSTOLIC BLOOD PRESSURE: 120 MMHG | WEIGHT: 211.4 LBS | HEART RATE: 82 BPM | RESPIRATION RATE: 16 BRPM

## 2021-12-07 DIAGNOSIS — E78.5 DYSLIPIDEMIA: ICD-10-CM

## 2021-12-07 DIAGNOSIS — R00.2 PALPITATIONS: ICD-10-CM

## 2021-12-07 DIAGNOSIS — I10 ESSENTIAL HYPERTENSION: Primary | ICD-10-CM

## 2021-12-07 PROCEDURE — 99213 OFFICE O/P EST LOW 20 MIN: CPT | Performed by: PHYSICIAN ASSISTANT

## 2021-12-07 PROCEDURE — 93000 ELECTROCARDIOGRAM COMPLETE: CPT | Performed by: PHYSICIAN ASSISTANT

## 2021-12-07 NOTE — PROGRESS NOTES
Beatriz Amaya DO  Em Marrero  1960  12/07/2021    Patient Active Problem List   Diagnosis   • Palpitations   • Dyslipidemia   • Essential hypertension   • Gastroesophageal reflux disease without esophagitis   • Pulmonary emphysema (HCC)   • Arthritis   • Hives   • Neck pain   • Bruit of right carotid artery   • Precordial pain       Dear Beatriz Amaya DO:    Subjective     History of Present Illness:    Chief Complaint   Patient presents with   • Palpitations     6 mos follow   • Med Management     list provided       Em Marrero is a pleasant 61 y.o. female with a past medical history significant for  no known coronary artery disease but does have history of essential hypertension, dyslipidemia, COPD, and history of palpitations.  She comes in today for routine cardiology follow-up.    Ms. Marrero does report that she had cholesterol checked at her PCPs office with excellent improvement in her LDL since being started on Praluent she reports that her LDL dropped from 150-77. Currently I do not have these records with me today. She does report that she had a colonoscopy performed as well during which she tells me that her heart rate did drop and was bradycardic at times but thankfully no sequelae occurred and after she recovered from anesthesia had no issues. She does deny any syncope, near syncope, or dizziness. She does report for few days after the procedure she had some racing heart rate. Otherwise she also denies any chest pains or shortness of breath.      Allergies   Allergen Reactions   • Coreg [Carvedilol] Shortness Of Breath   • Meloxicam Other (See Comments)     Blister on lip   • Metoprolol Hives   • Atorvastatin Myalgia     leg cramps   • Dexamethasone Rash   • Ketorolac Rash   • Ketorolac Tromethamine Rash   • Levothyroxine Sodium Other (See Comments)     Blister on lip  bust my lips open   :      Current Outpatient Medications:   •  acetaminophen (TYLENOL) 500 MG tablet, Take 500 mg by  mouth Every 6 (Six) Hours As Needed for Mild Pain ., Disp: , Rfl:   •  Alirocumab (Praluent) 75 MG/ML solution auto-injector, Inject 75 mg under the skin into the appropriate area as directed Every 14 (Fourteen) Days., Disp: 6 mL, Rfl: 1  •  amitriptyline (ELAVIL) 10 MG tablet, Take 1 tablet by mouth Every Night., Disp: 30 tablet, Rfl: 2  •  ANORO ELLIPTA 62.5-25 MCG/INH aerosol powder , INL 1 PUFF PO D, Disp: , Rfl: 2  •  Biotin 12228 MCG tablet, Take 5,000 mcg by mouth Daily., Disp: , Rfl:   •  cyclobenzaprine (FLEXERIL) 10 MG tablet, Take 10 mg by mouth Daily., Disp: , Rfl:   •  dilTIAZem CD (CARDIZEM CD) 300 MG 24 hr capsule, TAKE 1 CAPSULE BY MOUTH DAILY, Disp: 30 capsule, Rfl: 3  •  esomeprazole (nexIUM) 40 MG capsule, Take 40 mg by mouth Every Morning Before Breakfast., Disp: , Rfl:   •  furosemide (LASIX) 20 MG tablet, Take 1 tablet by mouth As Needed (LE edema)., Disp: 30 tablet, Rfl: 6  •  ipratropium-albuterol (COMBIVENT RESPIMAT)  MCG/ACT inhaler, Inhale 1 puff., Disp: , Rfl:   •  losartan (COZAAR) 25 MG tablet, Take 25 mg by mouth Daily., Disp: , Rfl:   •  nitroglycerin (NITROSTAT) 0.4 MG SL tablet, 1 under the tongue as needed for angina, may repeat q5mins for up three doses, Disp: 100 tablet, Rfl: 11  •  O2 (OXYGEN), Inhale 2 L/min Every Night., Disp: , Rfl:   •  ondansetron (ZOFRAN) 8 MG tablet, Take 1 tablet by mouth every 8 (eight) hours as needed for nausea or vomiting., Disp: 25 tablet, Rfl: 1  •  Plecanatide (Trulance) 3 MG tablet, Take 3 mg by mouth Daily., Disp: , Rfl:   •  polycarbophil (calcium polycarbophil) 625 MG tablet tablet, Take 625 mg by mouth Daily., Disp: , Rfl:   •  potassium chloride 10 MEQ CR tablet, TAKE 1 TABLET BY MOUTH TWICE DAILY (Patient taking differently: As Needed.), Disp: 180 tablet, Rfl: 3  •  SITagliptin (JANUVIA) 100 MG tablet, Take 100 mg by mouth Daily., Disp: , Rfl:   •  vitamin D3 125 MCG (5000 UT) capsule capsule, Take 5,000 Units by mouth Daily., Disp: ,  "Rfl:   •  docusate sodium (COLACE) 100 MG capsule, Take 100 mg by mouth Every Night., Disp: , Rfl:     The following portions of the patient's history were reviewed and updated as appropriate: allergies, current medications, past family history, past medical history, past social history, past surgical history and problem list.    Social History     Tobacco Use   • Smoking status: Current Every Day Smoker     Types: Electronic Cigarette   • Smokeless tobacco: Never Used   Substance Use Topics   • Alcohol use: No   • Drug use: No         Objective   Vitals:    12/07/21 1106   BP: 120/75   Pulse: 82   Resp: 16   Temp: 97.1 °F (36.2 °C)   Weight: 95.9 kg (211 lb 6.4 oz)   Height: 165.1 cm (65\")     Body mass index is 35.18 kg/m².    Constitutional:       General: Not in acute distress.     Appearance: Healthy appearance. Well-developed and not in distress. Not diaphoretic.   Eyes:      Conjunctiva/sclera: Conjunctivae normal.      Pupils: Pupils are equal, round, and reactive to light.   HENT:      Head: Normocephalic and atraumatic.   Neck:      Vascular: No carotid bruit or JVD.   Pulmonary:      Effort: Pulmonary effort is normal. No respiratory distress.      Breath sounds: Normal breath sounds.   Cardiovascular:      Normal rate. Regular rhythm.   Skin:     General: Skin is cool.   Neurological:      Mental Status: Alert, oriented to person, place, and time and oriented to person, place and time.         Lab Results   Component Value Date     04/16/2021    K 4.2 04/16/2021    CL 99 04/16/2021    CO2 29.1 (H) 04/16/2021    BUN 15 04/16/2021    CREATININE 0.81 04/16/2021    GLUCOSE 103 (H) 04/16/2021    CALCIUM 9.4 04/16/2021    AST 19 04/16/2021    ALT 23 04/16/2021    ALKPHOS 82 04/16/2021    LABIL2 1.3 (L) 04/13/2016     Lab Results   Component Value Date    CKTOTAL 130 12/23/2014     Lab Results   Component Value Date    WBC 8.29 04/16/2021    HGB 14.6 04/16/2021    HCT 44.5 04/16/2021     04/16/2021 "     Lab Results   Component Value Date    INR <0.90 11/24/2014    INR 0.94 08/16/2014     Lab Results   Component Value Date    MG 2.3 04/01/2021     Lab Results   Component Value Date    TSH 1.380 04/16/2021    CHLPL 215 (H) 04/13/2016    TRIG 137 04/16/2021    HDL 47 04/16/2021     (H) 04/16/2021      Lab Results   Component Value Date    BNP 7 11/24/2014       During this visit the following were done:  Labs Reviewed []    Labs Ordered []    Radiology Reports Reviewed []    Radiology Ordered []    PCP Records Reviewed []    Referring Provider Records Reviewed []    ER Records Reviewed []    Hospital Records Reviewed []    History Obtained From Family []    Radiology Images Reviewed []    Other Reviewed []    Records Requested []         ECG 12 Lead    Date/Time: 12/7/2021 11:07 AM  Performed by: López Bowles PA-C  Authorized by: López Bowles PA-C   Comparison: compared with previous ECG   Similar to previous ECG  Rhythm: sinus rhythm  Ectopy: multifocal PVCs  ST Segments: ST segments normal    Clinical impression: normal ECG            Assessment/Plan    Diagnosis Plan   1. Essential hypertension     2. Dyslipidemia     3. Palpitations              Recommendations:  1. Palpitations  1. Since her colonoscopy she has been essentially asymptomatic tolerating diltiazem well she does have known PVCs as a cause of her palpitations I will continue this for now however I did inform her that if she becomes symptomatic such as dizziness, syncope, or presyncope to contact our office and we can adjust dose.  2. Essential hypertension  1. Currently well controlled      No follow-ups on file.    As always, I appreciate very much the opportunity to participate in the cardiovascular care of your patients.      With Best Regards,    López Bowles PA-C

## 2022-01-31 ENCOUNTER — SPECIALTY PHARMACY (OUTPATIENT)
Dept: PHARMACY | Facility: HOSPITAL | Age: 62
End: 2022-01-31

## 2022-01-31 RX ORDER — DILTIAZEM HYDROCHLORIDE 300 MG/1
CAPSULE, COATED, EXTENDED RELEASE ORAL
Qty: 30 CAPSULE | Refills: 3 | Status: SHIPPED | OUTPATIENT
Start: 2022-01-31 | End: 2022-06-01 | Stop reason: SDUPTHER

## 2022-02-01 NOTE — PROGRESS NOTES
Specialty Pharmacy Refill Coordination Note     Em is a 61 y.o. female contacted today regarding refills of one specialty medication(s).    Reviewed and verified with patient:       Specialty medication(s) and dose(s) confirmed: yes    Refill Questions      Most Recent Value   Changes to allergies? No   Changes to medications? No   New conditions since last clinic visit No   Unplanned office visit, urgent care, ED, or hospital admission in the last 4 weeks  No   How does patient/caregiver feel medication is working? Very good   Financial problems or insurance changes  No   If yes, describe changes in insurance or financial issues. None   How many doses of your specialty medications were missed in the last 4 weeks? 0   Why were doses missed? NA   Does this patient require a clinical escalation to a pharmacist? No                Medication Adherence    Adherence tools used: calendar  Support network for adherence: family member          Follow-up: 84 day(s)     Ted Lisa, Pharmacy Technician  Specialty Pharmacy Technician

## 2022-03-31 ENCOUNTER — SPECIALTY PHARMACY (OUTPATIENT)
Dept: PHARMACY | Facility: HOSPITAL | Age: 62
End: 2022-03-31

## 2022-03-31 RX ORDER — OMEPRAZOLE 40 MG/1
40 CAPSULE, DELAYED RELEASE ORAL 2 TIMES DAILY
COMMUNITY

## 2022-03-31 RX ORDER — ALBUTEROL SULFATE 90 UG/1
2 AEROSOL, METERED RESPIRATORY (INHALATION) EVERY 4 HOURS PRN
COMMUNITY

## 2022-03-31 RX ORDER — ALIROCUMAB 75 MG/ML
75 INJECTION, SOLUTION SUBCUTANEOUS
Qty: 6 ML | Refills: 1 | Status: SHIPPED | OUTPATIENT
Start: 2022-03-31 | End: 2022-09-12 | Stop reason: SDUPTHER

## 2022-03-31 NOTE — PROGRESS NOTES
[facility]  Lipid Management Program - PCSK9i       Em Marrero is a 61 y.o. female referred to the [facility] by López Bowles for clinical pharmacy and specialty pharmacy management of PCSK9i.  Em Marrero is  treated for hyperlipidemia and currently takes Praluent 75mg SC every 2 week.  In the past, Pt has tried Lipitor, Crestor, and pravastatin and could not tolerate due to myalgias. The patient denies any allergies to latex.  She denies any adverse effects to the medication, denies missing any doses and has no trouble giving herself the injection.     Relevant Past Medical History and Comorbidities  Past Medical History:   Diagnosis Date   • Dyslipidemia    • GERD (gastroesophageal reflux disease)    • Hypertension    • Hypokalemia    • Palpitations      Social History     Socioeconomic History   • Marital status: Legally    Tobacco Use   • Smoking status: Current Every Day Smoker     Types: Electronic Cigarette   • Smokeless tobacco: Never Used   Substance and Sexual Activity   • Alcohol use: No   • Drug use: No       Allergies  Coreg [carvedilol], Meloxicam, Metoprolol, Atorvastatin, Dexamethasone, Ketorolac, Ketorolac tromethamine, and Levothyroxine sodium    Current Medication List    Current Outpatient Medications:   •  acetaminophen (TYLENOL) 500 MG tablet, Take 500 mg by mouth Every 6 (Six) Hours As Needed for Mild Pain ., Disp: , Rfl:   •  albuterol sulfate  (90 Base) MCG/ACT inhaler, Inhale 2 puffs Every 4 (Four) Hours As Needed for Wheezing., Disp: , Rfl:   •  Alirocumab (Praluent) 75 MG/ML solution auto-injector, Inject 75 mg under the skin into the appropriate area as directed Every 14 (Fourteen) Days., Disp: 6 mL, Rfl: 1  •  amitriptyline (ELAVIL) 10 MG tablet, Take 1 tablet by mouth Every Night., Disp: 30 tablet, Rfl: 2  •  Biotin 82200 MCG tablet, Take 5,000 mcg by mouth Daily., Disp: , Rfl:   •  cyclobenzaprine (FLEXERIL) 10 MG tablet,  Take 10 mg by mouth Daily., Disp: , Rfl:   •  dilTIAZem CD (CARDIZEM CD) 300 MG 24 hr capsule, TAKE 1 CAPSULE BY MOUTH DAILY, Disp: 30 capsule, Rfl: 3  •  docusate sodium (COLACE) 100 MG capsule, Take 100 mg by mouth Every Night., Disp: , Rfl:   •  furosemide (LASIX) 20 MG tablet, Take 1 tablet by mouth As Needed (LE edema)., Disp: 30 tablet, Rfl: 6  •  losartan (COZAAR) 25 MG tablet, Take 25 mg by mouth Daily., Disp: , Rfl:   •  nitroglycerin (NITROSTAT) 0.4 MG SL tablet, 1 under the tongue as needed for angina, may repeat q5mins for up three doses, Disp: 100 tablet, Rfl: 11  •  O2 (OXYGEN), Inhale 2 L/min Every Night., Disp: , Rfl:   •  omeprazole (priLOSEC) 40 MG capsule, Take 40 mg by mouth 2 (Two) Times a Day., Disp: , Rfl:   •  ondansetron (ZOFRAN) 8 MG tablet, Take 1 tablet by mouth every 8 (eight) hours as needed for nausea or vomiting., Disp: 25 tablet, Rfl: 1  •  Plecanatide (Trulance) 3 MG tablet, Take 3 mg by mouth Daily., Disp: , Rfl:   •  polycarbophil 625 MG tablet tablet, Take 625 mg by mouth Daily As Needed., Disp: , Rfl:   •  potassium chloride 10 MEQ CR tablet, TAKE 1 TABLET BY MOUTH TWICE DAILY (Patient taking differently: As Needed.), Disp: 180 tablet, Rfl: 3  •  SITagliptin (JANUVIA) 100 MG tablet, Take 100 mg by mouth Daily., Disp: , Rfl:   •  vitamin D3 125 MCG (5000 UT) capsule capsule, Take 5,000 Units by mouth Daily., Disp: , Rfl:   •  ANORO ELLIPTA 62.5-25 MCG/INH aerosol powder , Inhale 1 puff Daily., Disp: , Rfl: 2    Drug Interactions  None with Repatha    Relevant Laboratory Values      Medication Assessment & Plan    Patient will continue Praluent 75mg every 2 weeks.  She will follow-up with cardiology in June for next appointment.  She would like to continue specialty mail out pharmacy services.     Will follow-up in 6 months, or sooner if needed.     Savannah Cross, PharmD  3/31/2022  16:30 EDT

## 2022-04-25 ENCOUNTER — SPECIALTY PHARMACY (OUTPATIENT)
Dept: PHARMACY | Facility: HOSPITAL | Age: 62
End: 2022-04-25

## 2022-04-25 NOTE — PROGRESS NOTES
Specialty Pharmacy Refill Coordination Note      Name:  Em Marrero  :  1960  Date:  2022         Past Medical History:   Diagnosis Date   • Dyslipidemia    • GERD (gastroesophageal reflux disease)    • Hypertension    • Hypokalemia    • Palpitations        Past Surgical History:   Procedure Laterality Date   • BLADDER SURGERY     • BREAST BIOPSY Right 8 YRS AGO   • CHOLECYSTECTOMY     • COLONOSCOPY      Negative   • CORONARY ANGIOPLASTY Left    • HYSTERECTOMY         Social History     Socioeconomic History   • Marital status: Legally    Tobacco Use   • Smoking status: Current Every Day Smoker     Types: Electronic Cigarette   • Smokeless tobacco: Never Used   Substance and Sexual Activity   • Alcohol use: No   • Drug use: No       Family History   Problem Relation Age of Onset   • Breast cancer Sister 66   • Coronary artery disease Sister 50   • Osteoarthritis Sister    • Osteoporosis Sister    • Rheum arthritis Sister    • Cancer Sister    • Diabetes Sister    • Hypertension Sister    • Osteoarthritis Mother    • Osteoarthritis Father    • Heart disease Father    • Hypertension Father    • Osteoarthritis Brother    • Gout Brother    • Hypertension Brother        Allergies   Allergen Reactions   • Coreg [Carvedilol] Shortness Of Breath   • Meloxicam Other (See Comments)     Blister on lip   • Metoprolol Hives   • Atorvastatin Myalgia     leg cramps   • Dexamethasone Rash   • Ketorolac Rash   • Ketorolac Tromethamine Rash   • Levothyroxine Sodium Other (See Comments)     Blister on lip  bust my lips open       Current Outpatient Medications   Medication Sig Dispense Refill   • acetaminophen (TYLENOL) 500 MG tablet Take 500 mg by mouth Every 6 (Six) Hours As Needed for Mild Pain .     • albuterol sulfate  (90 Base) MCG/ACT inhaler Inhale 2 puffs Every 4 (Four) Hours As Needed for Wheezing.     • Alirocumab (Praluent) 75 MG/ML solution auto-injector Inject 75 mg under the skin  into the appropriate area as directed Every 14 (Fourteen) Days. 6 mL 1   • amitriptyline (ELAVIL) 10 MG tablet Take 1 tablet by mouth Every Night. 30 tablet 2   • ANORO ELLIPTA 62.5-25 MCG/INH aerosol powder  Inhale 1 puff Daily.  2   • Biotin 28051 MCG tablet Take 5,000 mcg by mouth Daily.     • cyclobenzaprine (FLEXERIL) 10 MG tablet Take 10 mg by mouth Daily.     • dilTIAZem CD (CARDIZEM CD) 300 MG 24 hr capsule TAKE 1 CAPSULE BY MOUTH DAILY 30 capsule 3   • docusate sodium (COLACE) 100 MG capsule Take 100 mg by mouth Every Night.     • furosemide (LASIX) 20 MG tablet Take 1 tablet by mouth As Needed (LE edema). 30 tablet 6   • losartan (COZAAR) 25 MG tablet Take 25 mg by mouth Daily.     • nitroglycerin (NITROSTAT) 0.4 MG SL tablet 1 under the tongue as needed for angina, may repeat q5mins for up three doses 100 tablet 11   • O2 (OXYGEN) Inhale 2 L/min Every Night.     • omeprazole (priLOSEC) 40 MG capsule Take 40 mg by mouth 2 (Two) Times a Day.     • ondansetron (ZOFRAN) 8 MG tablet Take 1 tablet by mouth every 8 (eight) hours as needed for nausea or vomiting. 25 tablet 1   • Plecanatide (Trulance) 3 MG tablet Take 3 mg by mouth Daily.     • polycarbophil 625 MG tablet tablet Take 625 mg by mouth Daily As Needed.     • potassium chloride 10 MEQ CR tablet TAKE 1 TABLET BY MOUTH TWICE DAILY (Patient taking differently: As Needed.) 180 tablet 3   • SITagliptin (JANUVIA) 100 MG tablet Take 100 mg by mouth Daily.     • vitamin D3 125 MCG (5000 UT) capsule capsule Take 5,000 Units by mouth Daily.       No current facility-administered medications for this visit.         ASSESSMENT/PLAN:      Em is a 61 y.o. female contacted today regarding refills of  Praluent 75mg  specialty medication(s).    Reviewed and verified with patient:         Specialty medication(s) and dose(s) confirmed: yes    Refill Questions    Flowsheet Row Most Recent Value   Changes to allergies? No   Changes to medications? No   New conditions  since last clinic visit No   Unplanned office visit, urgent care, ED, or hospital admission in the last 4 weeks  No   How does patient/caregiver feel medication is working? Excellent   Financial problems or insurance changes  No   If yes, describe changes in insurance or financial issues. NONE   How many doses of your specialty medications were missed in the last 4 weeks? 0   Why were doses missed? NA   Does this patient require a clinical escalation to a pharmacist? No                Medication Adherence    Adherence tools used: calendar  Support network for adherence: family member          Follow-up: 84 day(s)     Mitra Goodwin, Pharmacy Technician  Specialty Pharmacy Technician

## 2022-06-01 RX ORDER — DILTIAZEM HYDROCHLORIDE 300 MG/1
300 CAPSULE, COATED, EXTENDED RELEASE ORAL DAILY
Qty: 30 CAPSULE | Refills: 3 | Status: SHIPPED | OUTPATIENT
Start: 2022-06-01 | End: 2022-09-28 | Stop reason: SDUPTHER

## 2022-06-07 ENCOUNTER — OFFICE VISIT (OUTPATIENT)
Dept: CARDIOLOGY | Facility: CLINIC | Age: 62
End: 2022-06-07

## 2022-06-07 VITALS
HEART RATE: 87 BPM | WEIGHT: 208.4 LBS | OXYGEN SATURATION: 99 % | BODY MASS INDEX: 34.72 KG/M2 | HEIGHT: 65 IN | SYSTOLIC BLOOD PRESSURE: 135 MMHG | DIASTOLIC BLOOD PRESSURE: 76 MMHG

## 2022-06-07 DIAGNOSIS — E78.5 DYSLIPIDEMIA: ICD-10-CM

## 2022-06-07 DIAGNOSIS — I10 ESSENTIAL HYPERTENSION: Primary | ICD-10-CM

## 2022-06-07 PROCEDURE — 93000 ELECTROCARDIOGRAM COMPLETE: CPT | Performed by: PHYSICIAN ASSISTANT

## 2022-06-07 PROCEDURE — 99213 OFFICE O/P EST LOW 20 MIN: CPT | Performed by: PHYSICIAN ASSISTANT

## 2022-06-07 NOTE — PROGRESS NOTES
Baetriz Amaya DO  Em Marrero  1960  06/07/2022    Patient Active Problem List   Diagnosis   • Palpitations   • Dyslipidemia   • Essential hypertension   • Gastroesophageal reflux disease without esophagitis   • Pulmonary emphysema (HCC)   • Arthritis   • Hives   • Neck pain   • Bruit of right carotid artery   • Precordial pain       Dear Beatriz Amaya DO:    Subjective     History of Present Illness:    Chief Complaint   Patient presents with   • Follow-up     6MO F/U, PT STATES NO COMPLAINTS   • Med Management     VERBALLY       Em Marrero is a pleasant 61 y.o. female with a past medical history significant for no known coronary artery disease but does have history of essential hypertension, dyslipidemia, COPD, and history of palpitations.  She comes in today for routine cardiology follow-up.    Patient denies any chest pain, shortness of breath, palpitations, dizziness, syncope or near syncope.  Cholesterol has remained well controlled since the addition of Praluent.        Allergies   Allergen Reactions   • Coreg [Carvedilol] Shortness Of Breath   • Meloxicam Other (See Comments)     Blister on lip   • Metoprolol Hives   • Atorvastatin Myalgia     leg cramps   • Dexamethasone Rash   • Ketorolac Rash   • Ketorolac Tromethamine Rash   • Levothyroxine Sodium Other (See Comments)     Blister on lip  bust my lips open   :      Current Outpatient Medications:   •  acetaminophen (TYLENOL) 500 MG tablet, Take 500 mg by mouth Every 6 (Six) Hours As Needed for Mild Pain ., Disp: , Rfl:   •  albuterol sulfate  (90 Base) MCG/ACT inhaler, Inhale 2 puffs Every 4 (Four) Hours As Needed for Wheezing., Disp: , Rfl:   •  Alirocumab (Praluent) 75 MG/ML solution auto-injector, Inject 75 mg under the skin into the appropriate area as directed Every 14 (Fourteen) Days., Disp: 6 mL, Rfl: 1  •  amitriptyline (ELAVIL) 10 MG tablet, Take 1 tablet by mouth Every Night., Disp: 30 tablet, Rfl: 2  •  ANORO ELLIPTA  62.5-25 MCG/INH aerosol powder , Inhale 1 puff Daily., Disp: , Rfl: 2  •  cyclobenzaprine (FLEXERIL) 10 MG tablet, Take 10 mg by mouth Daily., Disp: , Rfl:   •  dilTIAZem CD (CARDIZEM CD) 300 MG 24 hr capsule, Take 1 capsule by mouth Daily., Disp: 30 capsule, Rfl: 3  •  docusate sodium (COLACE) 100 MG capsule, Take 100 mg by mouth Every Night., Disp: , Rfl:   •  furosemide (LASIX) 20 MG tablet, Take 1 tablet by mouth As Needed (LE edema)., Disp: 30 tablet, Rfl: 6  •  losartan (COZAAR) 25 MG tablet, Take 25 mg by mouth Daily., Disp: , Rfl:   •  nitroglycerin (NITROSTAT) 0.4 MG SL tablet, 1 under the tongue as needed for angina, may repeat q5mins for up three doses, Disp: 100 tablet, Rfl: 11  •  O2 (OXYGEN), Inhale 2 L/min Every Night., Disp: , Rfl:   •  omeprazole (priLOSEC) 40 MG capsule, Take 40 mg by mouth 2 (Two) Times a Day., Disp: , Rfl:   •  ondansetron (ZOFRAN) 8 MG tablet, Take 1 tablet by mouth every 8 (eight) hours as needed for nausea or vomiting., Disp: 25 tablet, Rfl: 1  •  Plecanatide (Trulance) 3 MG tablet, Take 3 mg by mouth Daily., Disp: , Rfl:   •  potassium chloride 10 MEQ CR tablet, TAKE 1 TABLET BY MOUTH TWICE DAILY (Patient taking differently: As Needed.), Disp: 180 tablet, Rfl: 3  •  SITagliptin (JANUVIA) 100 MG tablet, Take 100 mg by mouth Daily., Disp: , Rfl:   •  vitamin D3 125 MCG (5000 UT) capsule capsule, Take 5,000 Units by mouth Daily., Disp: , Rfl:   •  Biotin 59835 MCG tablet, Take 5,000 mcg by mouth Daily., Disp: , Rfl:   •  polycarbophil 625 MG tablet tablet, Take 625 mg by mouth Daily As Needed., Disp: , Rfl:     The following portions of the patient's history were reviewed and updated as appropriate: allergies, current medications, past family history, past medical history, past social history, past surgical history and problem list.    Social History     Tobacco Use   • Smoking status: Current Every Day Smoker     Types: Electronic Cigarette   • Smokeless tobacco: Never Used  "  Substance Use Topics   • Alcohol use: No   • Drug use: No         Objective   Vitals:    06/07/22 1055   BP: 135/76   Pulse: 87   SpO2: 99%   Weight: 94.5 kg (208 lb 6.4 oz)   Height: 165.1 cm (65\")     Body mass index is 34.68 kg/m².    Constitutional:       General: Not in acute distress.     Appearance: Healthy appearance. Well-developed and not in distress. Not diaphoretic.   Eyes:      Conjunctiva/sclera: Conjunctivae normal.      Pupils: Pupils are equal, round, and reactive to light.   HENT:      Head: Normocephalic and atraumatic.   Neck:      Vascular: No carotid bruit or JVD.   Pulmonary:      Effort: Pulmonary effort is normal. No respiratory distress.      Breath sounds: Normal breath sounds.   Cardiovascular:      Normal rate. Regular rhythm.   Skin:     General: Skin is cool.   Neurological:      Mental Status: Alert, oriented to person, place, and time and oriented to person, place and time.         Lab Results   Component Value Date     04/16/2021    K 4.2 04/16/2021    CL 99 04/16/2021    CO2 29.1 (H) 04/16/2021    BUN 15 04/16/2021    CREATININE 0.81 04/16/2021    GLUCOSE 103 (H) 04/16/2021    CALCIUM 9.4 04/16/2021    AST 19 04/16/2021    ALT 23 04/16/2021    ALKPHOS 82 04/16/2021    LABIL2 1.3 (L) 04/13/2016     Lab Results   Component Value Date    CKTOTAL 130 12/23/2014     Lab Results   Component Value Date    WBC 8.29 04/16/2021    HGB 14.6 04/16/2021    HCT 44.5 04/16/2021     04/16/2021     Lab Results   Component Value Date    INR <0.90 11/24/2014    INR 0.94 08/16/2014     Lab Results   Component Value Date    MG 2.3 04/01/2021     Lab Results   Component Value Date    TSH 1.380 04/16/2021    CHLPL 215 (H) 04/13/2016    TRIG 137 04/16/2021    HDL 47 04/16/2021     (H) 04/16/2021      Lab Results   Component Value Date    BNP 7 11/24/2014       During this visit the following were done:  Labs Reviewed []    Labs Ordered []    Radiology Reports Reviewed []    Radiology " Ordered []    PCP Records Reviewed []    Referring Provider Records Reviewed []    ER Records Reviewed []    Hospital Records Reviewed []    History Obtained From Family []    Radiology Images Reviewed []    Other Reviewed []    Records Requested []         ECG 12 Lead    Date/Time: 6/7/2022 10:59 AM  Performed by: López Bowles PA-C  Authorized by: López Bowles PA-C   Comparison: compared with previous ECG   Similar to previous ECG  Rhythm: sinus rhythm  Conduction: conduction normal  Q waves: III      Clinical impression: non-specific ECG            Assessment & Plan    Diagnosis Plan   1. Essential hypertension     2. Dyslipidemia              Recommendations:  1. Essential hypertension  1. Currently well controlled we will continue current regimen with diltiazem and losartan.  2. Dyslipidemia  1. Well-controlled continue Praluent      Return in about 6 months (around 12/7/2022).    As always, I appreciate very much the opportunity to participate in the cardiovascular care of your patients.      With Best Regards,    López Bowles PA-C

## 2022-07-14 ENCOUNTER — SPECIALTY PHARMACY (OUTPATIENT)
Dept: PHARMACY | Facility: HOSPITAL | Age: 62
End: 2022-07-14

## 2022-07-14 NOTE — PROGRESS NOTES
Specialty Pharmacy Refill Coordination Note      Name:  Em Marrero  :  1960  Date:  2022         Past Medical History:   Diagnosis Date   • Dyslipidemia    • GERD (gastroesophageal reflux disease)    • Hypertension    • Hypokalemia    • Palpitations        Past Surgical History:   Procedure Laterality Date   • BLADDER SURGERY     • BREAST BIOPSY Right 8 YRS AGO   • CHOLECYSTECTOMY     • COLONOSCOPY      Negative   • CORONARY ANGIOPLASTY Left    • HYSTERECTOMY         Social History     Socioeconomic History   • Marital status: Legally    Tobacco Use   • Smoking status: Current Every Day Smoker     Types: Electronic Cigarette   • Smokeless tobacco: Never Used   Substance and Sexual Activity   • Alcohol use: No   • Drug use: No       Family History   Problem Relation Age of Onset   • Breast cancer Sister 66   • Coronary artery disease Sister 50   • Osteoarthritis Sister    • Osteoporosis Sister    • Rheum arthritis Sister    • Cancer Sister    • Diabetes Sister    • Hypertension Sister    • Osteoarthritis Mother    • Osteoarthritis Father    • Heart disease Father    • Hypertension Father    • Osteoarthritis Brother    • Gout Brother    • Hypertension Brother        Allergies   Allergen Reactions   • Coreg [Carvedilol] Shortness Of Breath   • Meloxicam Other (See Comments)     Blister on lip   • Metoprolol Hives   • Atorvastatin Myalgia     leg cramps   • Dexamethasone Rash   • Ketorolac Rash   • Ketorolac Tromethamine Rash   • Levothyroxine Sodium Other (See Comments)     Blister on lip  bust my lips open       Current Outpatient Medications   Medication Sig Dispense Refill   • acetaminophen (TYLENOL) 500 MG tablet Take 500 mg by mouth Every 6 (Six) Hours As Needed for Mild Pain .     • albuterol sulfate  (90 Base) MCG/ACT inhaler Inhale 2 puffs Every 4 (Four) Hours As Needed for Wheezing.     • Alirocumab (Praluent) 75 MG/ML solution auto-injector Inject 75 mg under the skin  into the appropriate area as directed Every 14 (Fourteen) Days. 6 mL 1   • amitriptyline (ELAVIL) 10 MG tablet Take 1 tablet by mouth Every Night. 30 tablet 2   • ANORO ELLIPTA 62.5-25 MCG/INH aerosol powder  Inhale 1 puff Daily.  2   • Biotin 80121 MCG tablet Take 5,000 mcg by mouth Daily.     • cyclobenzaprine (FLEXERIL) 10 MG tablet Take 10 mg by mouth Daily.     • dilTIAZem CD (CARDIZEM CD) 300 MG 24 hr capsule Take 1 capsule by mouth Daily. 30 capsule 3   • docusate sodium (COLACE) 100 MG capsule Take 100 mg by mouth Every Night.     • furosemide (LASIX) 20 MG tablet Take 1 tablet by mouth As Needed (LE edema). 30 tablet 6   • losartan (COZAAR) 25 MG tablet Take 25 mg by mouth Daily.     • nitroglycerin (NITROSTAT) 0.4 MG SL tablet 1 under the tongue as needed for angina, may repeat q5mins for up three doses 100 tablet 11   • O2 (OXYGEN) Inhale 2 L/min Every Night.     • omeprazole (priLOSEC) 40 MG capsule Take 40 mg by mouth 2 (Two) Times a Day.     • ondansetron (ZOFRAN) 8 MG tablet Take 1 tablet by mouth every 8 (eight) hours as needed for nausea or vomiting. 25 tablet 1   • Plecanatide (Trulance) 3 MG tablet Take 3 mg by mouth Daily.     • polycarbophil 625 MG tablet tablet Take 625 mg by mouth Daily As Needed.     • potassium chloride 10 MEQ CR tablet TAKE 1 TABLET BY MOUTH TWICE DAILY (Patient taking differently: As Needed.) 180 tablet 3   • SITagliptin (JANUVIA) 100 MG tablet Take 100 mg by mouth Daily.     • vitamin D3 125 MCG (5000 UT) capsule capsule Take 5,000 Units by mouth Daily.       No current facility-administered medications for this visit.         ASSESSMENT/PLAN:      Em is a 61 y.o. female contacted today regarding refills of  Praluent 75mg/ml specialty medication(s).    Reviewed and verified with patient:         Specialty medication(s) and dose(s) confirmed: yes    Refill Questions    Flowsheet Row Most Recent Value   Changes to allergies? No   Changes to medications? No   New  conditions since last clinic visit No   Unplanned office visit, urgent care, ED, or hospital admission in the last 4 weeks  No   How does patient/caregiver feel medication is working? Very good   Financial problems or insurance changes  No   If yes, describe changes in insurance or financial issues. none   Since the previous refill, were any specialty medication doses or scheduled injections missed or delayed?  No   If yes, please provide the amount 0   Why were doses missed? e   Does this patient require a clinical escalation to a pharmacist? No                Medication Adherence    Adherence tools used: calendar  Support network for adherence: family member          Follow-up: 84 day(s)     Mitra Goodwin, Pharmacy Technician  Specialty Pharmacy Technician

## 2022-09-12 ENCOUNTER — SPECIALTY PHARMACY (OUTPATIENT)
Dept: PHARMACY | Facility: HOSPITAL | Age: 62
End: 2022-09-12

## 2022-09-12 RX ORDER — MONTELUKAST SODIUM 10 MG/1
10 TABLET ORAL NIGHTLY
COMMUNITY

## 2022-09-12 RX ORDER — MELOXICAM 7.5 MG/1
15 TABLET ORAL DAILY
COMMUNITY

## 2022-09-12 RX ORDER — ALIROCUMAB 75 MG/ML
75 INJECTION, SOLUTION SUBCUTANEOUS
Qty: 6 ML | Refills: 1 | Status: SHIPPED | OUTPATIENT
Start: 2022-09-12 | End: 2023-02-17 | Stop reason: SDUPTHER

## 2022-09-12 RX ORDER — SUCRALFATE 1 G/1
1 TABLET ORAL 4 TIMES DAILY PRN
COMMUNITY

## 2022-09-12 NOTE — PROGRESS NOTES
[facility]  Lipid Management Program - PCSK9i       Em Marrero is a 61 y.o. female referred to the [facility] by López Bowles for clinical pharmacy and specialty pharmacy management of PCSK9i.  Em Marrero is  treated for hyperlipidemia and currently takes Praluent 75mg SC every 2 weeks.  In the past, Pt has tried Lipitor, Crestor, and pravastatin and could not tolerate due to myalgias. The patient denies any allergies to latex.  She denies any adverse effects to the medication, denies missing any doses and has no trouble giving herself the injection.     Relevant Past Medical History and Comorbidities  Past Medical History:   Diagnosis Date   • Dyslipidemia    • GERD (gastroesophageal reflux disease)    • Hypertension    • Hypokalemia    • Palpitations      Social History     Socioeconomic History   • Marital status: Legally    Tobacco Use   • Smoking status: Current Every Day Smoker     Types: Electronic Cigarette   • Smokeless tobacco: Never Used   Substance and Sexual Activity   • Alcohol use: No   • Drug use: No       Allergies  Coreg [carvedilol], Meloxicam, Metoprolol, Atorvastatin, Dexamethasone, Ketorolac, Ketorolac tromethamine, and Levothyroxine sodium    Current Medication List    Current Outpatient Medications:   •  acetaminophen (TYLENOL) 500 MG tablet, Take 500 mg by mouth Every 6 (Six) Hours As Needed for Mild Pain ., Disp: , Rfl:   •  albuterol sulfate  (90 Base) MCG/ACT inhaler, Inhale 2 puffs Every 4 (Four) Hours As Needed for Wheezing., Disp: , Rfl:   •  Alirocumab (Praluent) 75 MG/ML solution auto-injector, Inject 75 mg under the skin into the appropriate area as directed Every 14 (Fourteen) Days., Disp: 6 mL, Rfl: 1  •  amitriptyline (ELAVIL) 10 MG tablet, Take 1 tablet by mouth Every Night., Disp: 30 tablet, Rfl: 2  •  ANORO ELLIPTA 62.5-25 MCG/INH aerosol powder , Inhale 1 puff Daily., Disp: , Rfl: 2  •  Biotin 20672 MCG tablet, Take  5,000 mcg by mouth Daily., Disp: , Rfl:   •  cyclobenzaprine (FLEXERIL) 10 MG tablet, Take 10 mg by mouth Daily., Disp: , Rfl:   •  dilTIAZem CD (CARDIZEM CD) 300 MG 24 hr capsule, Take 1 capsule by mouth Daily., Disp: 30 capsule, Rfl: 3  •  docusate sodium (COLACE) 100 MG capsule, Take 100 mg by mouth Every Night., Disp: , Rfl:   •  furosemide (LASIX) 20 MG tablet, Take 1 tablet by mouth As Needed (LE edema)., Disp: 30 tablet, Rfl: 6  •  losartan (COZAAR) 25 MG tablet, Take 25 mg by mouth Daily., Disp: , Rfl:   •  nitroglycerin (NITROSTAT) 0.4 MG SL tablet, 1 under the tongue as needed for angina, may repeat q5mins for up three doses, Disp: 100 tablet, Rfl: 11  •  O2 (OXYGEN), Inhale 2 L/min Every Night., Disp: , Rfl:   •  omeprazole (priLOSEC) 40 MG capsule, Take 40 mg by mouth 2 (Two) Times a Day., Disp: , Rfl:   •  ondansetron (ZOFRAN) 8 MG tablet, Take 1 tablet by mouth every 8 (eight) hours as needed for nausea or vomiting., Disp: 25 tablet, Rfl: 1  •  Plecanatide (Trulance) 3 MG tablet, Take 3 mg by mouth Daily., Disp: , Rfl:   •  polycarbophil 625 MG tablet tablet, Take 625 mg by mouth Daily As Needed., Disp: , Rfl:   •  potassium chloride 10 MEQ CR tablet, TAKE 1 TABLET BY MOUTH TWICE DAILY (Patient taking differently: As Needed.), Disp: 180 tablet, Rfl: 3  •  SITagliptin (JANUVIA) 100 MG tablet, Take 100 mg by mouth Daily., Disp: , Rfl:   •  vitamin D3 125 MCG (5000 UT) capsule capsule, Take 5,000 Units by mouth Daily., Disp: , Rfl:     Drug Interactions  None with Repatha    Relevant Laboratory Values    Most recent LDL 68 on 8/17/22 (verbal from [facility] [employer], awaiting fax)     Medication Assessment & Plan    Patient will continue Praluent 75mg every 2 weeks.  She will follow-up with cardiology in March for next appointment.  She would like to continue specialty mail out pharmacy services.     Will follow-up in 6 months, or sooner if needed.     Savannah Cross, PharmD  9/12/2022  15:35  EDT

## 2022-09-28 RX ORDER — DILTIAZEM HYDROCHLORIDE 300 MG/1
300 CAPSULE, COATED, EXTENDED RELEASE ORAL DAILY
Qty: 30 CAPSULE | Refills: 3 | Status: SHIPPED | OUTPATIENT
Start: 2022-09-28 | End: 2023-02-07

## 2022-12-08 ENCOUNTER — SPECIALTY PHARMACY (OUTPATIENT)
Dept: PHARMACY | Facility: HOSPITAL | Age: 62
End: 2022-12-08

## 2022-12-08 NOTE — PROGRESS NOTES
Specialty Pharmacy Refill Coordination Note      Name:  Em Marrero  :  1960  Date:  2022         Past Medical History:   Diagnosis Date   • Dyslipidemia    • GERD (gastroesophageal reflux disease)    • Hypertension    • Hypokalemia    • Palpitations        Past Surgical History:   Procedure Laterality Date   • BLADDER SURGERY     • BREAST BIOPSY Right 8 YRS AGO   • CHOLECYSTECTOMY     • COLONOSCOPY      Negative   • CORONARY ANGIOPLASTY Left    • HYSTERECTOMY         Social History     Socioeconomic History   • Marital status: Legally    Tobacco Use   • Smoking status: Every Day     Types: Electronic Cigarette   • Smokeless tobacco: Never   Substance and Sexual Activity   • Alcohol use: No   • Drug use: No       Family History   Problem Relation Age of Onset   • Breast cancer Sister 66   • Coronary artery disease Sister 50   • Osteoarthritis Sister    • Osteoporosis Sister    • Rheum arthritis Sister    • Cancer Sister    • Diabetes Sister    • Hypertension Sister    • Osteoarthritis Mother    • Osteoarthritis Father    • Heart disease Father    • Hypertension Father    • Osteoarthritis Brother    • Gout Brother    • Hypertension Brother        Allergies   Allergen Reactions   • Coreg [Carvedilol] Shortness Of Breath   • Meloxicam Other (See Comments)     Blister on lip  Tolerates now (as of 22)    • Metoprolol Hives   • Atorvastatin Myalgia     leg cramps   • Dexamethasone Rash   • Ketorolac Rash   • Ketorolac Tromethamine Rash   • Levothyroxine Sodium Other (See Comments)     Blister on lip  bust my lips open       Current Outpatient Medications   Medication Sig Dispense Refill   • albuterol sulfate  (90 Base) MCG/ACT inhaler Inhale 2 puffs Every 4 (Four) Hours As Needed for Wheezing.     • Alirocumab (Praluent) 75 MG/ML solution auto-injector Inject 1 mL under the skin into the appropriate area as directed Every 14 (Fourteen) Days. 6 mL 1   • amitriptyline (ELAVIL) 10  MG tablet Take 1 tablet by mouth Every Night. (Patient taking differently: Take 50 mg by mouth Every Night.) 30 tablet 2   • ANORO ELLIPTA 62.5-25 MCG/INH aerosol powder  Inhale 1 puff Daily.  2   • cyclobenzaprine (FLEXERIL) 10 MG tablet Take 10 mg by mouth Daily.     • dilTIAZem CD (CARDIZEM CD) 300 MG 24 hr capsule Take 1 capsule by mouth Daily. 30 capsule 3   • docusate sodium (COLACE) 100 MG capsule Take 100 mg by mouth Every Night.     • furosemide (LASIX) 20 MG tablet Take 1 tablet by mouth As Needed (LE edema). 30 tablet 6   • losartan (COZAAR) 25 MG tablet Take 25 mg by mouth Daily.     • meloxicam (MOBIC) 7.5 MG tablet Take 7.5 mg by mouth Daily.     • montelukast (SINGULAIR) 10 MG tablet Take 10 mg by mouth Every Night.     • nitroglycerin (NITROSTAT) 0.4 MG SL tablet 1 under the tongue as needed for angina, may repeat q5mins for up three doses 100 tablet 11   • O2 (OXYGEN) Inhale 2 L/min Every Night.     • omeprazole (priLOSEC) 40 MG capsule Take 40 mg by mouth 2 (Two) Times a Day.     • ondansetron (ZOFRAN) 8 MG tablet Take 1 tablet by mouth every 8 (eight) hours as needed for nausea or vomiting. 25 tablet 1   • Plecanatide (Trulance) 3 MG tablet Take 3 mg by mouth Daily.     • potassium chloride 10 MEQ CR tablet TAKE 1 TABLET BY MOUTH TWICE DAILY (Patient taking differently: As Needed.) 180 tablet 3   • SITagliptin (JANUVIA) 100 MG tablet Take 100 mg by mouth Daily.     • sucralfate (CARAFATE) 1 g tablet Take 1 g by mouth 4 (Four) Times a Day As Needed.     • vitamin D3 125 MCG (5000 UT) capsule capsule Take 5,000 Units by mouth Daily.       No current facility-administered medications for this visit.         ASSESSMENT/PLAN:      Em is a 62 y.o. female contacted today regarding refills of  Praluent 75mg/ml injection specialty medication(s).    Reviewed and verified with patient:       Specialty medication(s) and dose(s) confirmed: yes    Refill Questions    Flowsheet Row Most Recent Value   Changes  to allergies? No   Changes to medications? No   New conditions since last clinic visit No   Unplanned office visit, urgent care, ED, or hospital admission in the last 4 weeks  No   How does patient/caregiver feel medication is working? Very good   Financial problems or insurance changes  No   If yes, describe changes in insurance or financial issues. no   Since the previous refill, were any specialty medication doses or scheduled injections missed or delayed?  No   If yes, please provide the amount 0   Why were doses missed? no   Does this patient require a clinical escalation to a pharmacist? No                Medication Adherence    Adherence tools used: calendar  Support network for adherence: family member          Follow-up: 28 day(s)     Georgina Meyers, Pharmacy Technician  Specialty Pharmacy Technician

## 2023-02-07 RX ORDER — DILTIAZEM HYDROCHLORIDE 300 MG/1
CAPSULE, COATED, EXTENDED RELEASE ORAL
Qty: 30 CAPSULE | Refills: 0 | Status: SHIPPED | OUTPATIENT
Start: 2023-02-07 | End: 2023-03-07 | Stop reason: SDUPTHER

## 2023-02-17 ENCOUNTER — SPECIALTY PHARMACY (OUTPATIENT)
Dept: PHARMACY | Facility: HOSPITAL | Age: 63
End: 2023-02-17
Payer: MEDICARE

## 2023-02-17 RX ORDER — ALIROCUMAB 75 MG/ML
75 INJECTION, SOLUTION SUBCUTANEOUS
Qty: 6 ML | Refills: 1 | Status: SHIPPED | OUTPATIENT
Start: 2023-02-17

## 2023-02-17 NOTE — PROGRESS NOTES
[facility]  Lipid Management Program - PCSK[facility]       Em Marrero is a 62 y.o. female referred to the [facility] by López Bowles for clinical pharmacy and specialty pharmacy management of PCSK[facility].  Em Marrero is  treated for hyperlipidemia and currently takes Praluent 75mg SC every 2 weeks.  In the past, Pt has tried Lipitor, Crestor, and pravastatin and could not tolerate due to myalgias. The patient denies any allergies to latex.  She denies any adverse effects to the medication, denies missing any doses and has no trouble giving herself the injection.     Relevant Past Medical History and Comorbidities  Past Medical History:   Diagnosis Date   • Dyslipidemia    • GERD (gastroesophageal reflux disease)    • Hypertension    • Hypokalemia    • Palpitations      Social History     Socioeconomic History   • Marital status: Legally    Tobacco Use   • Smoking status: Every Day     Types: Electronic Cigarette   • Smokeless tobacco: Never   Substance and Sexual Activity   • Alcohol use: No   • Drug use: No       Allergies  Coreg [carvedilol], Meloxicam, Metoprolol, Atorvastatin, Dexamethasone, Ketorolac, Ketorolac tromethamine, and Levothyroxine sodium    Current Medication List    Current Outpatient Medications:   •  albuterol sulfate  (90 Base) MCG/ACT inhaler, Inhale 2 puffs Every 4 (Four) Hours As Needed for Wheezing., Disp: , Rfl:   •  Alirocumab (Praluent) 75 MG/ML solution auto-injector, Inject 1 mL under the skin into the appropriate area as directed Every 14 (Fourteen) Days., Disp: 6 mL, Rfl: 1  •  amitriptyline (ELAVIL) 10 MG tablet, Take 1 tablet by mouth Every Night. (Patient taking differently: Take 50 mg by mouth Every Night.), Disp: 30 tablet, Rfl: 2  •  ANORO ELLIPTA 62.5-25 MCG/INH aerosol powder , Inhale 1 puff Daily., Disp: , Rfl: 2  •  cyclobenzaprine (FLEXERIL) 10 MG tablet, Take 10 mg by mouth Daily., Disp: , Rfl:   •  dilTIAZem CD (CARDIZEM CD)  300 MG 24 hr capsule, Take 1 capsule by mouth once daily, Disp: 30 capsule, Rfl: 0  •  docusate sodium (COLACE) 100 MG capsule, Take 100 mg by mouth Every Night., Disp: , Rfl:   •  furosemide (LASIX) 20 MG tablet, Take 1 tablet by mouth As Needed (LE edema)., Disp: 30 tablet, Rfl: 6  •  losartan (COZAAR) 25 MG tablet, Take 25 mg by mouth Daily., Disp: , Rfl:   •  meloxicam (MOBIC) 7.5 MG tablet, Take 7.5 mg by mouth Daily., Disp: , Rfl:   •  montelukast (SINGULAIR) 10 MG tablet, Take 10 mg by mouth Every Night., Disp: , Rfl:   •  nitroglycerin (NITROSTAT) 0.4 MG SL tablet, 1 under the tongue as needed for angina, may repeat q5mins for up three doses, Disp: 100 tablet, Rfl: 11  •  O2 (OXYGEN), Inhale 2 L/min Every Night., Disp: , Rfl:   •  omeprazole (priLOSEC) 40 MG capsule, Take 40 mg by mouth 2 (Two) Times a Day., Disp: , Rfl:   •  ondansetron (ZOFRAN) 8 MG tablet, Take 1 tablet by mouth every 8 (eight) hours as needed for nausea or vomiting., Disp: 25 tablet, Rfl: 1  •  Plecanatide (Trulance) 3 MG tablet, Take 3 mg by mouth Daily., Disp: , Rfl:   •  potassium chloride 10 MEQ CR tablet, TAKE 1 TABLET BY MOUTH TWICE DAILY (Patient taking differently: As Needed.), Disp: 180 tablet, Rfl: 3  •  SITagliptin (JANUVIA) 100 MG tablet, Take 100 mg by mouth Daily., Disp: , Rfl:   •  sucralfate (CARAFATE) 1 g tablet, Take 1 g by mouth 4 (Four) Times a Day As Needed., Disp: , Rfl:   •  vitamin D3 125 MCG (5000 UT) capsule capsule, Take 5,000 Units by mouth Daily., Disp: , Rfl:     Drug Interactions  None with Repatha    Relevant Laboratory Values      Medication Assessment & Plan    Patient was last seen by cardiology on 6/7/22 and Praluent was continued. Her most recent LDL was at goal at 68 on 8/17/22.     Patient will continue Praluent 75mg every 2 weeks.  She will follow-up with cardiology in March for next appointment.  She would like to continue specialty mail out pharmacy services.     Will follow-up in 6 months, or  sooner if needed.     Adele Horne RPH  2/17/2023  11:06 EST

## 2023-03-07 ENCOUNTER — LAB (OUTPATIENT)
Dept: LAB | Facility: HOSPITAL | Age: 63
End: 2023-03-07
Payer: MEDICARE

## 2023-03-07 ENCOUNTER — TRANSCRIBE ORDERS (OUTPATIENT)
Dept: ADMINISTRATIVE | Facility: HOSPITAL | Age: 63
End: 2023-03-07
Payer: MEDICARE

## 2023-03-07 ENCOUNTER — OFFICE VISIT (OUTPATIENT)
Dept: CARDIOLOGY | Facility: CLINIC | Age: 63
End: 2023-03-07
Payer: MEDICARE

## 2023-03-07 VITALS
HEART RATE: 87 BPM | SYSTOLIC BLOOD PRESSURE: 146 MMHG | WEIGHT: 212.8 LBS | OXYGEN SATURATION: 94 % | BODY MASS INDEX: 35.45 KG/M2 | DIASTOLIC BLOOD PRESSURE: 86 MMHG | HEIGHT: 65 IN

## 2023-03-07 DIAGNOSIS — I10 ESSENTIAL HYPERTENSION: Primary | ICD-10-CM

## 2023-03-07 DIAGNOSIS — I10 ESSENTIAL HYPERTENSION: ICD-10-CM

## 2023-03-07 DIAGNOSIS — E78.5 DYSLIPIDEMIA: ICD-10-CM

## 2023-03-07 DIAGNOSIS — M51.36 DDD (DEGENERATIVE DISC DISEASE), LUMBAR: Primary | ICD-10-CM

## 2023-03-07 DIAGNOSIS — R09.89 BRUIT OF RIGHT CAROTID ARTERY: ICD-10-CM

## 2023-03-07 PROCEDURE — 93000 ELECTROCARDIOGRAM COMPLETE: CPT | Performed by: PHYSICIAN ASSISTANT

## 2023-03-07 PROCEDURE — 80053 COMPREHEN METABOLIC PANEL: CPT

## 2023-03-07 PROCEDURE — 99214 OFFICE O/P EST MOD 30 MIN: CPT | Performed by: PHYSICIAN ASSISTANT

## 2023-03-07 PROCEDURE — 80061 LIPID PANEL: CPT

## 2023-03-07 PROCEDURE — 36415 COLL VENOUS BLD VENIPUNCTURE: CPT

## 2023-03-07 RX ORDER — AMITRIPTYLINE HYDROCHLORIDE 50 MG/1
50 TABLET, FILM COATED ORAL
COMMUNITY
Start: 2023-02-28

## 2023-03-07 RX ORDER — DILTIAZEM HYDROCHLORIDE 300 MG/1
300 CAPSULE, COATED, EXTENDED RELEASE ORAL DAILY
Qty: 90 CAPSULE | Refills: 3 | Status: SHIPPED | OUTPATIENT
Start: 2023-03-07

## 2023-03-07 RX ORDER — DULAGLUTIDE 0.75 MG/.5ML
INJECTION, SOLUTION SUBCUTANEOUS
COMMUNITY
Start: 2023-02-28

## 2023-03-07 RX ORDER — FUROSEMIDE 20 MG/1
20 TABLET ORAL AS NEEDED
Qty: 30 TABLET | Refills: 6 | Status: SHIPPED | OUTPATIENT
Start: 2023-03-07

## 2023-03-07 NOTE — PROGRESS NOTES
Beatriz Amaya DO  Em Marrero  1960  03/07/2023    Patient Active Problem List   Diagnosis   • Palpitations   • Dyslipidemia   • Essential hypertension   • Gastroesophageal reflux disease without esophagitis   • Pulmonary emphysema (HCC)   • Arthritis   • Hives   • Neck pain   • Bruit of right carotid artery   • Precordial pain       Dear Beatriz Amaya DO:    Subjective     History of Present Illness:    Chief Complaint   Patient presents with   • Follow-up     9 month   • Palpitations       Em Marrero is a pleasant 62 y.o. female with a past medical history significant for no known coronary artery disease but does have history of essential hypertension, dyslipidemia, COPD, and history of palpitations.  She comes in today for routine cardiology follow-up.     Patient denies any chest pain, shortness of breath, palpitations, dizziness, syncope or near syncope.     Allergies   Allergen Reactions   • Coreg [Carvedilol] Shortness Of Breath   • Meloxicam Other (See Comments)     Blister on lip  Tolerates now (as of 9/12/22)    • Metoprolol Hives   • Atorvastatin Myalgia     leg cramps   • Dexamethasone Rash   • Ketorolac Rash   • Ketorolac Tromethamine Rash   • Levothyroxine Sodium Other (See Comments)     Blister on lip  bust my lips open   :      Current Outpatient Medications:   •  albuterol sulfate  (90 Base) MCG/ACT inhaler, Inhale 2 puffs Every 4 (Four) Hours As Needed for Wheezing., Disp: , Rfl:   •  Alirocumab (Praluent) 75 MG/ML solution auto-injector, Inject 1 mL under the skin into the appropriate area as directed Every 14 (Fourteen) Days., Disp: 6 mL, Rfl: 1  •  amitriptyline (ELAVIL) 10 MG tablet, Take 1 tablet by mouth Every Night. (Patient taking differently: Take 5 tablets by mouth Every Night.), Disp: 30 tablet, Rfl: 2  •  amitriptyline (ELAVIL) 50 MG tablet, Take 1 tablet by mouth every night at bedtime., Disp: , Rfl:   •  ANORO ELLIPTA 62.5-25 MCG/INH aerosol powder , Inhale  1 puff Daily., Disp: , Rfl: 2  •  Cholecalciferol 125 MCG (5000 UT) tablet, Take 1 tablet by mouth Daily., Disp: , Rfl:   •  cyclobenzaprine (FLEXERIL) 10 MG tablet, Take 1 tablet by mouth Daily., Disp: , Rfl:   •  dilTIAZem CD (CARDIZEM CD) 300 MG 24 hr capsule, Take 1 capsule by mouth Daily., Disp: 90 capsule, Rfl: 3  •  furosemide (LASIX) 20 MG tablet, Take 1 tablet by mouth As Needed (LE edema)., Disp: 30 tablet, Rfl: 6  •  meloxicam (MOBIC) 7.5 MG tablet, Take 2 tablets by mouth Daily., Disp: , Rfl:   •  montelukast (SINGULAIR) 10 MG tablet, Take 1 tablet by mouth Every Night., Disp: , Rfl:   •  nitroglycerin (NITROSTAT) 0.4 MG SL tablet, 1 under the tongue as needed for angina, may repeat q5mins for up three doses, Disp: 100 tablet, Rfl: 11  •  O2 (OXYGEN), Inhale 2 L/min Every Night., Disp: , Rfl:   •  omeprazole (priLOSEC) 40 MG capsule, Take 1 capsule by mouth 2 (Two) Times a Day., Disp: , Rfl:   •  ondansetron (ZOFRAN) 8 MG tablet, Take 1 tablet by mouth every 8 (eight) hours as needed for nausea or vomiting., Disp: 25 tablet, Rfl: 1  •  Plecanatide (Trulance) 3 MG tablet, Take 1 tablet by mouth Daily., Disp: , Rfl:   •  potassium chloride 10 MEQ CR tablet, TAKE 1 TABLET BY MOUTH TWICE DAILY (Patient taking differently: As Needed.), Disp: 180 tablet, Rfl: 3  •  sucralfate (CARAFATE) 1 g tablet, Take 1 tablet by mouth 4 (Four) Times a Day As Needed., Disp: , Rfl:   •  Trulicity 0.75 MG/0.5ML solution pen-injector, INJECT 0.75 MG SUBCUTANEOUSLY EVERY WEEK, Disp: , Rfl:   •  vitamin D3 125 MCG (5000 UT) capsule capsule, Take 1 capsule by mouth Daily., Disp: , Rfl:   •  SITagliptin (JANUVIA) 100 MG tablet, Take 1 tablet by mouth Daily., Disp: , Rfl:     The following portions of the patient's history were reviewed and updated as appropriate: allergies, current medications, past family history, past medical history, past social history, past surgical history and problem list.    Social History     Tobacco Use  "  • Smoking status: Every Day     Types: Electronic Cigarette   • Smokeless tobacco: Never   Vaping Use   • Vaping Use: Every day   • Substances: Nicotine   • Devices: Pre-filled or refillable cartridge   Substance Use Topics   • Alcohol use: No   • Drug use: No         Objective   Vitals:    03/07/23 1046   BP: 146/86   BP Location: Right arm   Patient Position: Sitting   Cuff Size: Adult   Pulse: 87   SpO2: 94%   Weight: 96.5 kg (212 lb 12.8 oz)   Height: 165.1 cm (65\")     Body mass index is 35.41 kg/m².    Constitutional:       General: Not in acute distress.     Appearance: Healthy appearance. Well-developed and not in distress. Not diaphoretic.   Eyes:      Conjunctiva/sclera: Conjunctivae normal.      Pupils: Pupils are equal, round, and reactive to light.   HENT:      Head: Normocephalic and atraumatic.   Neck:      Vascular: No carotid bruit or JVD.   Pulmonary:      Effort: Pulmonary effort is normal. No respiratory distress.      Breath sounds: Normal breath sounds.   Cardiovascular:      Normal rate. Regular rhythm.   Skin:     General: Skin is cool.   Neurological:      Mental Status: Alert, oriented to person, place, and time and oriented to person, place and time.         Lab Results   Component Value Date     04/16/2021    K 4.2 04/16/2021    CL 99 04/16/2021    CO2 29.1 (H) 04/16/2021    BUN 15 04/16/2021    CREATININE 0.81 04/16/2021    GLUCOSE 103 (H) 04/16/2021    CALCIUM 9.4 04/16/2021    AST 19 04/16/2021    ALT 23 04/16/2021    ALKPHOS 82 04/16/2021    LABIL2 1.3 (L) 04/13/2016     Lab Results   Component Value Date    CKTOTAL 130 12/23/2014     Lab Results   Component Value Date    WBC 8.29 04/16/2021    HGB 14.6 04/16/2021    HCT 44.5 04/16/2021     04/16/2021     Lab Results   Component Value Date    INR <0.90 11/24/2014    INR 0.94 08/16/2014     Lab Results   Component Value Date    MG 2.3 04/01/2021     Lab Results   Component Value Date    TSH 1.380 04/16/2021    CHLPL 215 " (H) 04/13/2016    TRIG 137 04/16/2021    HDL 47 04/16/2021     (H) 04/16/2021      Lab Results   Component Value Date    BNP 7 11/24/2014       During this visit the following were done:  Labs Reviewed []    Labs Ordered []    Radiology Reports Reviewed []    Radiology Ordered []    PCP Records Reviewed []    Referring Provider Records Reviewed []    ER Records Reviewed []    Hospital Records Reviewed []    History Obtained From Family []    Radiology Images Reviewed []    Other Reviewed []    Records Requested []         ECG 12 Lead    Date/Time: 3/7/2023 10:46 AM  Performed by: López Bowles PA-C  Authorized by: López Bowles PA-C   Comparison: compared with previous ECG from 6/7/2022  Similar to previous ECG  Rhythm: sinus rhythm  Conduction: conduction normal    Clinical impression: normal ECG            Assessment & Plan    Diagnosis Plan   1. Essential hypertension  Comprehensive Metabolic Panel    Lipid Panel      2. Dyslipidemia  Comprehensive Metabolic Panel    Lipid Panel               Recommendations:  1. Carotid artery disease  a. Will check with carotid US.  2. dyslipidemia  a. Will check lipid panel and cmp      No follow-ups on file.    As always, I appreciate very much the opportunity to participate in the cardiovascular care of your patients.      With Best Regards,    López Bowles PA-C

## 2023-03-08 ENCOUNTER — TRANSCRIBE ORDERS (OUTPATIENT)
Dept: ADMINISTRATIVE | Facility: HOSPITAL | Age: 63
End: 2023-03-08
Payer: MEDICARE

## 2023-03-08 DIAGNOSIS — Z87.891 PERSONAL HISTORY OF TOBACCO USE, PRESENTING HAZARDS TO HEALTH: Primary | ICD-10-CM

## 2023-03-08 LAB
ALBUMIN SERPL-MCNC: 3.9 G/DL (ref 3.5–5.2)
ALBUMIN/GLOB SERPL: 1.3 G/DL
ALP SERPL-CCNC: 78 U/L (ref 39–117)
ALT SERPL W P-5'-P-CCNC: 33 U/L (ref 1–33)
ANION GAP SERPL CALCULATED.3IONS-SCNC: 8 MMOL/L (ref 5–15)
AST SERPL-CCNC: 21 U/L (ref 1–32)
BILIRUB SERPL-MCNC: <0.2 MG/DL (ref 0–1.2)
BUN SERPL-MCNC: 15 MG/DL (ref 8–23)
BUN/CREAT SERPL: 20 (ref 7–25)
CALCIUM SPEC-SCNC: 9.1 MG/DL (ref 8.6–10.5)
CHLORIDE SERPL-SCNC: 105 MMOL/L (ref 98–107)
CHOLEST SERPL-MCNC: 149 MG/DL (ref 0–200)
CO2 SERPL-SCNC: 27 MMOL/L (ref 22–29)
CREAT SERPL-MCNC: 0.75 MG/DL (ref 0.57–1)
EGFRCR SERPLBLD CKD-EPI 2021: 89.6 ML/MIN/1.73
GLOBULIN UR ELPH-MCNC: 3.1 GM/DL
GLUCOSE SERPL-MCNC: 93 MG/DL (ref 65–99)
HDLC SERPL-MCNC: 48 MG/DL (ref 40–60)
LDLC SERPL CALC-MCNC: 80 MG/DL (ref 0–100)
LDLC/HDLC SERPL: 1.63 {RATIO}
POTASSIUM SERPL-SCNC: 4.5 MMOL/L (ref 3.5–5.2)
PROT SERPL-MCNC: 7 G/DL (ref 6–8.5)
SODIUM SERPL-SCNC: 140 MMOL/L (ref 136–145)
TRIGL SERPL-MCNC: 114 MG/DL (ref 0–150)
VLDLC SERPL-MCNC: 21 MG/DL (ref 5–40)

## 2023-03-16 ENCOUNTER — TELEPHONE (OUTPATIENT)
Dept: CARDIOLOGY | Facility: CLINIC | Age: 63
End: 2023-03-16
Payer: MEDICARE

## 2023-03-16 NOTE — TELEPHONE ENCOUNTER
Hub can read.     Called pt no answer LM.     ----- Message from López Bowles PA-C sent at 3/7/2023 11:26 AM EST -----  Can we let her know that I was reviewing chart after her visit and saw that she has not had a carotid ultrasound since 2017.  I would like to have this checked because at that time it did show minimal carotid artery disease.

## 2023-03-16 NOTE — TELEPHONE ENCOUNTER
Caller: Em Marrero    Relationship to patient: Self    Best call back number: [phone]    Patient is needing: PT CALLED TO REPORT THAT SHE IS SCHEDULED FOR A CAROTID US 4.10.23

## 2023-05-03 ENCOUNTER — HOSPITAL ENCOUNTER (OUTPATIENT)
Dept: CT IMAGING | Facility: HOSPITAL | Age: 63
Discharge: HOME | End: 2023-05-03
Payer: MEDICARE

## 2023-05-03 ENCOUNTER — HOSPITAL ENCOUNTER (OUTPATIENT)
Dept: MRI IMAGING | Facility: HOSPITAL | Age: 63
Discharge: HOME | End: 2023-05-03
Payer: MEDICARE

## 2023-05-03 ENCOUNTER — HOSPITAL ENCOUNTER (OUTPATIENT)
Dept: CARDIOLOGY | Facility: HOSPITAL | Age: 63
Discharge: HOME | End: 2023-05-03
Payer: MEDICARE

## 2023-05-03 DIAGNOSIS — M51.36 DDD (DEGENERATIVE DISC DISEASE), LUMBAR: ICD-10-CM

## 2023-05-03 DIAGNOSIS — I10 ESSENTIAL HYPERTENSION: ICD-10-CM

## 2023-05-03 DIAGNOSIS — R09.89 BRUIT OF RIGHT CAROTID ARTERY: ICD-10-CM

## 2023-05-03 DIAGNOSIS — Z87.891 PERSONAL HISTORY OF TOBACCO USE, PRESENTING HAZARDS TO HEALTH: ICD-10-CM

## 2023-05-03 PROCEDURE — 93880 EXTRACRANIAL BILAT STUDY: CPT

## 2023-05-03 PROCEDURE — 71271 CT THORAX LUNG CANCER SCR C-: CPT | Performed by: RADIOLOGY

## 2023-05-03 PROCEDURE — 93880 EXTRACRANIAL BILAT STUDY: CPT | Performed by: RADIOLOGY

## 2023-05-03 PROCEDURE — 71271 CT THORAX LUNG CANCER SCR C-: CPT

## 2023-05-08 ENCOUNTER — SPECIALTY PHARMACY (OUTPATIENT)
Dept: PHARMACY | Facility: HOSPITAL | Age: 63
End: 2023-05-08
Payer: MEDICARE

## 2023-05-08 NOTE — PROGRESS NOTES
Spoke with patient concerning her recent medication changes. Patient has stopped Prilosec and has replaced with Dexilant and Pepcid. There are no drug-drug interactions with these and Praluent. No further work-up is warranted.     Adele Horne RPH  05/08/23  15:53 EDT

## 2023-05-08 NOTE — PROGRESS NOTES
Specialty Pharmacy Refill Coordination Note      Name:  Em Marrero  :  1960  Date:  2023         Past Medical History:   Diagnosis Date   • Dyslipidemia    • GERD (gastroesophageal reflux disease)    • Hypertension    • Hypokalemia    • Palpitations        Past Surgical History:   Procedure Laterality Date   • BLADDER SURGERY     • BREAST BIOPSY Right 8 YRS AGO   • CHOLECYSTECTOMY     • COLONOSCOPY      Negative   • CORONARY ANGIOPLASTY Left    • HYSTERECTOMY         Social History     Socioeconomic History   • Marital status: Legally    Tobacco Use   • Smoking status: Every Day     Types: Electronic Cigarette   • Smokeless tobacco: Never   Vaping Use   • Vaping Use: Every day   • Substances: Nicotine   • Devices: Pre-filled or refillable cartridge   Substance and Sexual Activity   • Alcohol use: No   • Drug use: No   • Sexual activity: Defer       Family History   Problem Relation Age of Onset   • Breast cancer Sister 66   • Coronary artery disease Sister 50   • Osteoarthritis Sister    • Osteoporosis Sister    • Rheum arthritis Sister    • Cancer Sister    • Diabetes Sister    • Hypertension Sister    • Osteoarthritis Mother    • Osteoarthritis Father    • Heart disease Father    • Hypertension Father    • Osteoarthritis Brother    • Gout Brother    • Hypertension Brother        Allergies   Allergen Reactions   • Coreg [Carvedilol] Shortness Of Breath   • Meloxicam Other (See Comments)     Blister on lip  Tolerates now (as of 22)    • Metoprolol Hives   • Atorvastatin Myalgia     leg cramps   • Dexamethasone Rash   • Ketorolac Rash   • Ketorolac Tromethamine Rash   • Levothyroxine Sodium Other (See Comments)     Blister on lip  bust my lips open       Current Outpatient Medications   Medication Sig Dispense Refill   • albuterol sulfate  (90 Base) MCG/ACT inhaler Inhale 2 puffs Every 4 (Four) Hours As Needed for Wheezing.     • Alirocumab (Praluent) 75 MG/ML solution  auto-injector Inject 1 mL under the skin into the appropriate area as directed Every 14 (Fourteen) Days. 6 mL 1   • amitriptyline (ELAVIL) 10 MG tablet Take 1 tablet by mouth Every Night. (Patient taking differently: Take 5 tablets by mouth Every Night.) 30 tablet 2   • amitriptyline (ELAVIL) 50 MG tablet Take 1 tablet by mouth every night at bedtime.     • ANORO ELLIPTA 62.5-25 MCG/INH aerosol powder  Inhale 1 puff Daily.  2   • Cholecalciferol 125 MCG (5000 UT) tablet Take 1 tablet by mouth Daily.     • cyclobenzaprine (FLEXERIL) 10 MG tablet Take 1 tablet by mouth Daily.     • dilTIAZem CD (CARDIZEM CD) 300 MG 24 hr capsule Take 1 capsule by mouth Daily. 90 capsule 3   • furosemide (LASIX) 20 MG tablet Take 1 tablet by mouth As Needed (LE edema). 30 tablet 6   • meloxicam (MOBIC) 7.5 MG tablet Take 2 tablets by mouth Daily.     • montelukast (SINGULAIR) 10 MG tablet Take 1 tablet by mouth Every Night.     • nitroglycerin (NITROSTAT) 0.4 MG SL tablet 1 under the tongue as needed for angina, may repeat q5mins for up three doses 100 tablet 11   • O2 (OXYGEN) Inhale 2 L/min Every Night.     • omeprazole (priLOSEC) 40 MG capsule Take 1 capsule by mouth 2 (Two) Times a Day.     • ondansetron (ZOFRAN) 8 MG tablet Take 1 tablet by mouth every 8 (eight) hours as needed for nausea or vomiting. 25 tablet 1   • Plecanatide (Trulance) 3 MG tablet Take 1 tablet by mouth Daily.     • potassium chloride 10 MEQ CR tablet TAKE 1 TABLET BY MOUTH TWICE DAILY (Patient taking differently: As Needed.) 180 tablet 3   • SITagliptin (JANUVIA) 100 MG tablet Take 1 tablet by mouth Daily.     • sucralfate (CARAFATE) 1 g tablet Take 1 tablet by mouth 4 (Four) Times a Day As Needed.     • Trulicity 0.75 MG/0.5ML solution pen-injector INJECT 0.75 MG SUBCUTANEOUSLY EVERY WEEK     • vitamin D3 125 MCG (5000 UT) capsule capsule Take 1 capsule by mouth Daily.       No current facility-administered medications for this visit.          ASSESSMENT/PLAN:      Em is a 62 y.o. female contacted today regarding refills of  Praluent 75mg injection specialty medication(s).    Reviewed and verified with patient:       Specialty medication(s) and dose(s) confirmed: yes    Refill Questions    Flowsheet Row Most Recent Value   Changes to allergies? No   Changes to medications? Yes  [Patient started on Pepcid 20 BID and Dexilant 60. D/C Prilosec]   New conditions since last clinic visit No   Unplanned office visit, urgent care, ED, or hospital admission in the last 4 weeks  No   How does patient/caregiver feel medication is working? Very good   Financial problems or insurance changes  No   If yes, describe changes in insurance or financial issues. no   Since the previous refill, were any specialty medication doses or scheduled injections missed or delayed?  No   If yes, please provide the amount no   Why were doses missed? na   Does this patient require a clinical escalation to a pharmacist? Yes                Medication Adherence    Adherence tools used: calendar  Support network for adherence: family member          Follow-up: 84 day(s)     France Alcocer Pharmacy Technician  Specialty Pharmacy Technician

## 2023-07-27 RX ORDER — ALIROCUMAB 75 MG/ML
75 INJECTION, SOLUTION SUBCUTANEOUS
Qty: 6 ML | Refills: 1 | Status: CANCELLED | OUTPATIENT
Start: 2023-07-27

## 2023-07-28 ENCOUNTER — SPECIALTY PHARMACY (OUTPATIENT)
Dept: PHARMACY | Facility: HOSPITAL | Age: 63
End: 2023-07-28
Payer: MEDICARE

## 2023-07-28 RX ORDER — FAMOTIDINE 20 MG/1
20 TABLET, FILM COATED ORAL 2 TIMES DAILY
COMMUNITY
Start: 2023-07-26

## 2023-07-28 RX ORDER — ALIROCUMAB 75 MG/ML
75 INJECTION, SOLUTION SUBCUTANEOUS
Qty: 6 ML | Refills: 1 | Status: SHIPPED | OUTPATIENT
Start: 2023-07-28

## 2023-07-28 RX ORDER — DEXLANSOPRAZOLE 60 MG/1
60 CAPSULE, DELAYED RELEASE ORAL DAILY
COMMUNITY

## 2023-07-28 NOTE — PROGRESS NOTES
[facility]  Lipid Management Program - [facility]       Em Marrero is a 62 y.o. female referred to the [facility] by López Bowles for clinical pharmacy and specialty pharmacy management of [facility].  Em Marrero is  treated for hyperlipidemia and currently takes Praluent 75mg SC every 2 weeks.  In the past, Pt has tried Lipitor, Crestor, and pravastatin and could not tolerate due to myalgias. The patient denies any allergies to latex.  She denies any adverse effects to the medication, denies missing any doses and has no trouble giving herself the injection.     Relevant Past Medical History and Comorbidities  Past Medical History:   Diagnosis Date    Dyslipidemia     GERD (gastroesophageal reflux disease)     Hypertension     Hypokalemia     Palpitations      Social History     Socioeconomic History    Marital status: Legally    Tobacco Use    Smoking status: Every Day     Types: Electronic Cigarette    Smokeless tobacco: Never   Vaping Use    Vaping Use: Every day    Substances: Nicotine    Devices: Pre-filled or refillable cartridge   Substance and Sexual Activity    Alcohol use: No    Drug use: No    Sexual activity: Defer       Allergies  Coreg [carvedilol], Metoprolol, Atorvastatin, Dexamethasone, Ketorolac, Ketorolac tromethamine, and Levothyroxine sodium    Current Medication List    Current Outpatient Medications:     albuterol sulfate  (90 Base) MCG/ACT inhaler, Inhale 2 puffs Every 4 (Four) Hours As Needed for Wheezing., Disp: , Rfl:     Alirocumab (Praluent) 75 MG/ML solution auto-injector, Inject 1 mL under the skin into the appropriate area as directed Every 14 (Fourteen) Days., Disp: 6 mL, Rfl: 1    amitriptyline (ELAVIL) 50 MG tablet, Take 1 tablet by mouth every night at bedtime., Disp: , Rfl:     ANORO ELLIPTA 62.5-25 MCG/INH aerosol powder , Inhale 1 puff Daily., Disp: , Rfl: 2    Cholecalciferol 125 MCG (5000 UT) tablet, Take 1 tablet by mouth  Daily., Disp: , Rfl:     cyclobenzaprine (FLEXERIL) 10 MG tablet, Take 1 tablet by mouth Daily., Disp: , Rfl:     dexlansoprazole (Dexilant) 60 MG capsule, Take 1 capsule by mouth Daily., Disp: , Rfl:     dilTIAZem CD (CARDIZEM CD) 300 MG 24 hr capsule, Take 1 capsule by mouth Daily., Disp: 90 capsule, Rfl: 3    famotidine (PEPCID) 20 MG tablet, Take 1 tablet by mouth 2 (Two) Times a Day., Disp: , Rfl:     furosemide (LASIX) 20 MG tablet, Take 1 tablet by mouth As Needed (LE edema)., Disp: 30 tablet, Rfl: 6    meloxicam (MOBIC) 7.5 MG tablet, Take 2 tablets by mouth Daily., Disp: , Rfl:     montelukast (SINGULAIR) 10 MG tablet, Take 1 tablet by mouth Every Night., Disp: , Rfl:     nitroglycerin (NITROSTAT) 0.4 MG SL tablet, 1 under the tongue as needed for angina, may repeat q5mins for up three doses, Disp: 100 tablet, Rfl: 11    O2 (OXYGEN), Inhale 2 L/min Every Night., Disp: , Rfl:     ondansetron (ZOFRAN) 8 MG tablet, Take 1 tablet by mouth every 8 (eight) hours as needed for nausea or vomiting., Disp: 25 tablet, Rfl: 1    Plecanatide (Trulance) 3 MG tablet, Take 1 tablet by mouth Daily., Disp: , Rfl:     sucralfate (CARAFATE) 1 g tablet, Take 1 tablet by mouth 4 (Four) Times a Day As Needed., Disp: , Rfl:     potassium chloride 10 MEQ CR tablet, TAKE 1 TABLET BY MOUTH TWICE DAILY (Patient not taking: Reported on 7/28/2023), Disp: 180 tablet, Rfl: 3    Trulicity 0.75 MG/0.5ML solution pen-injector, INJECT 0.75 MG SUBCUTANEOUSLY EVERY WEEK (Patient not taking: Reported on 7/28/2023), Disp: , Rfl:     Drug Interactions  None with Repatha    Relevant Laboratory Values  Lipid Panel          3/7/2023    12:21   Lipid Panel   Total Cholesterol 149    Triglycerides 114    HDL Cholesterol 48    VLDL Cholesterol 21    LDL Cholesterol  80    LDL/HDL Ratio 1.63            Medication Assessment & Plan    Patient was last seen by cardiology on 3/7/23 and Praluent was continued. Her most recent LDL was at goal at 80 on 3/7/23.      Patient will continue Praluent 75mg every 2 weeks.  She will follow-up with cardiology in September for next appointment.  She would like to continue specialty mail out pharmacy services.     Will follow-up in 6 months, or sooner if needed.     Adele Horne RPH  7/28/2023  11:52 EDT

## 2023-09-07 ENCOUNTER — OFFICE VISIT (OUTPATIENT)
Dept: CARDIOLOGY | Facility: CLINIC | Age: 63
End: 2023-09-07
Payer: MEDICARE

## 2023-09-07 VITALS
HEART RATE: 90 BPM | RESPIRATION RATE: 16 BRPM | HEIGHT: 65 IN | WEIGHT: 201.6 LBS | OXYGEN SATURATION: 94 % | SYSTOLIC BLOOD PRESSURE: 117 MMHG | BODY MASS INDEX: 33.59 KG/M2 | DIASTOLIC BLOOD PRESSURE: 76 MMHG

## 2023-09-07 DIAGNOSIS — E78.5 DYSLIPIDEMIA: ICD-10-CM

## 2023-09-07 DIAGNOSIS — I10 ESSENTIAL HYPERTENSION: Primary | ICD-10-CM

## 2023-09-07 PROCEDURE — 1160F RVW MEDS BY RX/DR IN RCRD: CPT | Performed by: PHYSICIAN ASSISTANT

## 2023-09-07 PROCEDURE — 1159F MED LIST DOCD IN RCRD: CPT | Performed by: PHYSICIAN ASSISTANT

## 2023-09-07 PROCEDURE — 3078F DIAST BP <80 MM HG: CPT | Performed by: PHYSICIAN ASSISTANT

## 2023-09-07 PROCEDURE — 93000 ELECTROCARDIOGRAM COMPLETE: CPT | Performed by: PHYSICIAN ASSISTANT

## 2023-09-07 PROCEDURE — 99213 OFFICE O/P EST LOW 20 MIN: CPT | Performed by: PHYSICIAN ASSISTANT

## 2023-09-07 PROCEDURE — 3074F SYST BP LT 130 MM HG: CPT | Performed by: PHYSICIAN ASSISTANT

## 2023-09-07 RX ORDER — TIRZEPATIDE 5 MG/.5ML
5 INJECTION, SOLUTION SUBCUTANEOUS WEEKLY
COMMUNITY

## 2023-09-07 NOTE — PROGRESS NOTES
Beatriz Amaya DO  Em Marrero  1960  09/07/2023    Patient Active Problem List   Diagnosis    Palpitations    Dyslipidemia    Essential hypertension    Gastroesophageal reflux disease without esophagitis    Pulmonary emphysema    Arthritis    Hives    Neck pain    Bruit of right carotid artery    Precordial pain       Dear Beatriz Amaya DO:    Subjective     History of Present Illness:    Chief Complaint   Patient presents with    Follow-up     6 mos    Med Management     List provided       Em Marrero is a pleasant 62 y.o. female with a past medical history significant for no known coronary artery disease but does have history of essential hypertension, dyslipidemia, COPD, and history of palpitations.  She comes in today for routine cardiology follow-up.     Patient denies any chest pain, shortness of breath, palpitations, dizziness, syncope or near syncope.  Treatment reports that she has lost some weight recently after being placed on Ozempic/Mounjaro.  Cholesterol is much better controlled with LDL now at 80 previously was 177.    Allergies   Allergen Reactions    Coreg [Carvedilol] Shortness Of Breath    Metoprolol Hives    Atorvastatin Myalgia     leg cramps    Dexamethasone Rash    Ketorolac Rash    Ketorolac Tromethamine Rash    Levothyroxine Sodium Other (See Comments)     Blister on lip  bust my lips open   :      Current Outpatient Medications:     albuterol sulfate  (90 Base) MCG/ACT inhaler, Inhale 2 puffs Every 4 (Four) Hours As Needed for Wheezing., Disp: , Rfl:     Alirocumab (Praluent) 75 MG/ML solution auto-injector, Inject 1 mL under the skin into the appropriate area as directed Every 14 (Fourteen) Days., Disp: 6 mL, Rfl: 1    amitriptyline (ELAVIL) 50 MG tablet, Take 1 tablet by mouth every night at bedtime., Disp: , Rfl:     ANORO ELLIPTA 62.5-25 MCG/INH aerosol powder , Inhale 1 puff Daily., Disp: , Rfl: 2    Cholecalciferol 125 MCG (5000 UT) tablet, Take 1 tablet by  mouth Daily., Disp: , Rfl:     dexlansoprazole (DEXILANT) 60 MG capsule, Take 1 capsule by mouth Daily., Disp: , Rfl:     dilTIAZem CD (CARDIZEM CD) 300 MG 24 hr capsule, Take 1 capsule by mouth Daily., Disp: 90 capsule, Rfl: 3    famotidine (PEPCID) 20 MG tablet, Take 1 tablet by mouth 2 (Two) Times a Day., Disp: , Rfl:     furosemide (LASIX) 20 MG tablet, Take 1 tablet by mouth As Needed (LE edema)., Disp: 30 tablet, Rfl: 6    meloxicam (MOBIC) 7.5 MG tablet, Take 2 tablets by mouth Daily., Disp: , Rfl:     montelukast (SINGULAIR) 10 MG tablet, Take 1 tablet by mouth Every Night., Disp: , Rfl:     nitroglycerin (NITROSTAT) 0.4 MG SL tablet, 1 under the tongue as needed for angina, may repeat q5mins for up three doses, Disp: 100 tablet, Rfl: 11    O2 (OXYGEN), Inhale 2 L/min Every Night., Disp: , Rfl:     ondansetron (ZOFRAN) 8 MG tablet, Take 1 tablet by mouth every 8 (eight) hours as needed for nausea or vomiting., Disp: 25 tablet, Rfl: 1    Plecanatide (Trulance) 3 MG tablet, Take 1 tablet by mouth Daily., Disp: , Rfl:     sucralfate (CARAFATE) 1 g tablet, Take 1 tablet by mouth 4 (Four) Times a Day As Needed., Disp: , Rfl:     Tirzepatide (Mounjaro) 5 MG/0.5ML solution pen-injector, Inject 0.5 mL under the skin into the appropriate area as directed 1 (One) Time Per Week., Disp: , Rfl:     cyclobenzaprine (FLEXERIL) 10 MG tablet, Take 1 tablet by mouth Daily., Disp: , Rfl:     potassium chloride 10 MEQ CR tablet, TAKE 1 TABLET BY MOUTH TWICE DAILY (Patient not taking: Reported on 7/28/2023), Disp: 180 tablet, Rfl: 3    Trulicity 0.75 MG/0.5ML solution pen-injector, INJECT 0.75 MG SUBCUTANEOUSLY EVERY WEEK (Patient not taking: Reported on 7/28/2023), Disp: , Rfl:     The following portions of the patient's history were reviewed and updated as appropriate: allergies, current medications, past family history, past medical history, past social history, past surgical history and problem list.    Social History  "    Tobacco Use    Smoking status: Every Day     Types: Electronic Cigarette    Smokeless tobacco: Never   Vaping Use    Vaping Use: Every day    Substances: Nicotine    Devices: Pre-filled or refillable cartridge   Substance Use Topics    Alcohol use: No    Drug use: No         Objective   Vitals:    09/07/23 1109   BP: 117/76   Pulse: 90   Resp: 16   SpO2: 94%   Weight: 91.4 kg (201 lb 9.6 oz)   Height: 165.1 cm (65\")     Body mass index is 33.55 kg/m².    Constitutional:       General: Not in acute distress.     Appearance: Healthy appearance. Well-developed and not in distress. Not diaphoretic.   Eyes:      Conjunctiva/sclera: Conjunctivae normal.      Pupils: Pupils are equal, round, and reactive to light.   HENT:      Head: Normocephalic and atraumatic.   Neck:      Vascular: No carotid bruit or JVD.   Pulmonary:      Effort: Pulmonary effort is normal. No respiratory distress.      Breath sounds: Normal breath sounds.   Cardiovascular:      Normal rate. Regular rhythm.   Edema:     Peripheral edema absent.   Skin:     General: Skin is cool.   Neurological:      Mental Status: Alert, oriented to person, place, and time and oriented to person, place and time.       Lab Results   Component Value Date     03/07/2023    K 4.5 03/07/2023     03/07/2023    CO2 27.0 03/07/2023    BUN 15 03/07/2023    CREATININE 0.75 03/07/2023    GLUCOSE 93 03/07/2023    CALCIUM 9.1 03/07/2023    AST 21 03/07/2023    ALT 33 03/07/2023    ALKPHOS 78 03/07/2023    LABIL2 1.3 (L) 04/13/2016     Lab Results   Component Value Date    CKTOTAL 130 12/23/2014     Lab Results   Component Value Date    WBC 8.29 04/16/2021    HGB 14.6 04/16/2021    HCT 44.5 04/16/2021     04/16/2021     Lab Results   Component Value Date    INR <0.90 11/24/2014    INR 0.94 08/16/2014     Lab Results   Component Value Date    MG 2.3 04/01/2021     Lab Results   Component Value Date    TSH 1.380 04/16/2021    CHLPL 215 (H) 04/13/2016    TRIG " 114 03/07/2023    HDL 48 03/07/2023    LDL 80 03/07/2023      Lab Results   Component Value Date    BNP 7 11/24/2014       During this visit the following were done:  Labs Reviewed []    Labs Ordered []    Radiology Reports Reviewed []    Radiology Ordered []    PCP Records Reviewed []    Referring Provider Records Reviewed []    ER Records Reviewed []    Hospital Records Reviewed []    History Obtained From Family []    Radiology Images Reviewed []    Other Reviewed []    Records Requested []         ECG 12 Lead    Date/Time: 9/7/2023 11:10 AM  Performed by: López Bowles PA-C  Authorized by: López Bowles PA-C   Comparison: compared with previous ECG   Similar to previous ECG  Rhythm: sinus rhythm  Conduction: conduction normal  ST Segments: ST segments normal    Clinical impression: normal ECG        Assessment & Plan    Diagnosis Plan   1. Essential hypertension        2. Dyslipidemia                 Recommendations:  Carotid artery disease  Discussed recent ultrasound which showed minimal plaquing.  Continue with aspirin and Praluent  Essential hypertension  Well-controlled.    Return in about 1 year (around 9/7/2024).    As always, I appreciate very much the opportunity to participate in the cardiovascular care of your patients.      With Best Regards,    López Bowles PA-C

## 2023-10-23 ENCOUNTER — SPECIALTY PHARMACY (OUTPATIENT)
Dept: PHARMACY | Facility: HOSPITAL | Age: 63
End: 2023-10-23
Payer: MEDICARE

## 2023-10-23 NOTE — PROGRESS NOTES
Specialty Pharmacy Refill Coordination Note      Name:  Em Marrero  :  1960  Date:  10/23/2023         Past Medical History:   Diagnosis Date    Dyslipidemia     GERD (gastroesophageal reflux disease)     Hypertension     Hypokalemia     Palpitations        Past Surgical History:   Procedure Laterality Date    BLADDER SURGERY      BREAST BIOPSY Right 8 YRS AGO    CHOLECYSTECTOMY      COLONOSCOPY      Negative    CORONARY ANGIOPLASTY Left 2000    HYSTERECTOMY         Social History     Socioeconomic History    Marital status: Legally    Tobacco Use    Smoking status: Every Day     Types: Electronic Cigarette    Smokeless tobacco: Never   Vaping Use    Vaping Use: Every day    Substances: Nicotine    Devices: Pre-filled or refillable cartridge   Substance and Sexual Activity    Alcohol use: No    Drug use: No    Sexual activity: Defer       Family History   Problem Relation Age of Onset    Breast cancer Sister 66    Coronary artery disease Sister 50    Osteoarthritis Sister     Osteoporosis Sister     Rheum arthritis Sister     Cancer Sister     Diabetes Sister     Hypertension Sister     Osteoarthritis Mother     Osteoarthritis Father     Heart disease Father     Hypertension Father     Osteoarthritis Brother     Gout Brother     Hypertension Brother        Allergies   Allergen Reactions    Coreg [Carvedilol] Shortness Of Breath    Metoprolol Hives    Atorvastatin Myalgia     leg cramps    Dexamethasone Rash    Ketorolac Rash    Ketorolac Tromethamine Rash    Levothyroxine Sodium Other (See Comments)     Blister on lip  bust my lips open       Current Outpatient Medications   Medication Sig Dispense Refill    albuterol sulfate  (90 Base) MCG/ACT inhaler Inhale 2 puffs Every 4 (Four) Hours As Needed for Wheezing.      Alirocumab (Praluent) 75 MG/ML solution auto-injector Inject 1 mL under the skin into the appropriate area as directed Every 14 (Fourteen) Days. 6 mL 1    amitriptyline  (ELAVIL) 50 MG tablet Take 1 tablet by mouth every night at bedtime.      ANORO ELLIPTA 62.5-25 MCG/INH aerosol powder  Inhale 1 puff Daily.  2    Cholecalciferol 125 MCG (5000 UT) tablet Take 1 tablet by mouth Daily.      cyclobenzaprine (FLEXERIL) 10 MG tablet Take 1 tablet by mouth Daily.      dexlansoprazole (DEXILANT) 60 MG capsule Take 1 capsule by mouth Daily.      dilTIAZem CD (CARDIZEM CD) 300 MG 24 hr capsule Take 1 capsule by mouth Daily. 90 capsule 3    famotidine (PEPCID) 20 MG tablet Take 1 tablet by mouth 2 (Two) Times a Day.      furosemide (LASIX) 20 MG tablet Take 1 tablet by mouth As Needed (LE edema). 30 tablet 6    meloxicam (MOBIC) 7.5 MG tablet Take 2 tablets by mouth Daily.      montelukast (SINGULAIR) 10 MG tablet Take 1 tablet by mouth Every Night.      nitroglycerin (NITROSTAT) 0.4 MG SL tablet 1 under the tongue as needed for angina, may repeat q5mins for up three doses 100 tablet 11    O2 (OXYGEN) Inhale 2 L/min Every Night.      ondansetron (ZOFRAN) 8 MG tablet Take 1 tablet by mouth every 8 (eight) hours as needed for nausea or vomiting. 25 tablet 1    Plecanatide (Trulance) 3 MG tablet Take 1 tablet by mouth Daily.      potassium chloride 10 MEQ CR tablet TAKE 1 TABLET BY MOUTH TWICE DAILY (Patient not taking: Reported on 7/28/2023) 180 tablet 3    sucralfate (CARAFATE) 1 g tablet Take 1 tablet by mouth 4 (Four) Times a Day As Needed.      Tirzepatide (Mounjaro) 5 MG/0.5ML solution pen-injector Inject 0.5 mL under the skin into the appropriate area as directed 1 (One) Time Per Week.      Trulicity 0.75 MG/0.5ML solution pen-injector INJECT 0.75 MG SUBCUTANEOUSLY EVERY WEEK (Patient not taking: Reported on 7/28/2023)       No current facility-administered medications for this visit.         ASSESSMENT/PLAN:      Em is a 63 y.o. female contacted today regarding refills of  Praluent 75mg injection  specialty medication(s).    Reviewed and verified with patient:       Specialty  medication(s) and dose(s) confirmed: yes    Refill Questions      Flowsheet Row Most Recent Value   Changes to allergies? No   Changes to medications? No   New conditions since last clinic visit No   Unplanned office visit, urgent care, ED, or hospital admission in the last 4 weeks  No   How does patient/caregiver feel medication is working? Very good   Financial problems or insurance changes  No   If yes, describe changes in insurance or financial issues. no   Since the previous refill, were any specialty medication doses or scheduled injections missed or delayed?  No   If yes, please provide the amount 0   Why were doses missed? n/a   Does this patient require a clinical escalation to a pharmacist? No                  Medication Adherence          Adherence tools used: calendar      Support network for adherence: family member                Follow-up: 84 day(s)     Georgina Meyers, Pharmacy Technician  Specialty Pharmacy Technician

## 2023-12-29 ENCOUNTER — SPECIALTY PHARMACY (OUTPATIENT)
Dept: PHARMACY | Facility: HOSPITAL | Age: 63
End: 2023-12-29
Payer: MEDICARE

## 2023-12-29 DIAGNOSIS — E78.5 DYSLIPIDEMIA: Primary | ICD-10-CM

## 2023-12-29 RX ORDER — ALIROCUMAB 75 MG/ML
75 INJECTION, SOLUTION SUBCUTANEOUS
Qty: 6 ML | Refills: 1 | Status: SHIPPED | OUTPATIENT
Start: 2023-12-29

## 2023-12-29 RX ORDER — FOLIC ACID 1 MG/1
1 TABLET ORAL DAILY
COMMUNITY

## 2023-12-29 NOTE — PROGRESS NOTES
[facility]  Lipid Management Program - [facility]       Em Marrero is a 63 y.o. female referred to the [facility] by López Bowles for clinical pharmacy and specialty pharmacy management of [facility].  Em Marrero is  treated for hyperlipidemia and currently takes Praluent 75mg SC every 2 weeks.  In the past, Pt has tried Lipitor, Crestor, and pravastatin and could not tolerate due to myalgias. The patient denies any allergies to latex.      She denies any adverse effects to the medication, denies missing any doses and has no trouble giving herself the injection.     Relevant Past Medical History and Comorbidities  Past Medical History:   Diagnosis Date    Dyslipidemia     GERD (gastroesophageal reflux disease)     Hypertension     Hypokalemia     Palpitations      Social History     Socioeconomic History    Marital status: Legally    Tobacco Use    Smoking status: Every Day     Types: Electronic Cigarette    Smokeless tobacco: Never   Vaping Use    Vaping Use: Every day    Substances: Nicotine    Devices: Pre-filled or refillable cartridge   Substance and Sexual Activity    Alcohol use: No    Drug use: No    Sexual activity: Defer       Allergies  Coreg [carvedilol], Metoprolol, Atorvastatin, Dexamethasone, Ketorolac, Ketorolac tromethamine, and Levothyroxine sodium    Current Medication List    Current Outpatient Medications:     albuterol sulfate  (90 Base) MCG/ACT inhaler, Inhale 2 puffs Every 4 (Four) Hours As Needed for Wheezing., Disp: , Rfl:     Alirocumab (Praluent) 75 MG/ML solution auto-injector, Inject 1 mL under the skin into the appropriate area as directed Every 14 (Fourteen) Days., Disp: 6 mL, Rfl: 1    amitriptyline (ELAVIL) 50 MG tablet, Take 1 tablet by mouth every night at bedtime., Disp: , Rfl:     ANORO ELLIPTA 62.5-25 MCG/INH aerosol powder , Inhale 1 puff Daily., Disp: , Rfl: 2    cyclobenzaprine (FLEXERIL) 10 MG tablet, Take 1 tablet by  mouth Daily., Disp: , Rfl:     dexlansoprazole (DEXILANT) 60 MG capsule, Take 1 capsule by mouth Daily., Disp: , Rfl:     dilTIAZem CD (CARDIZEM CD) 300 MG 24 hr capsule, Take 1 capsule by mouth Daily., Disp: 90 capsule, Rfl: 3    famotidine (PEPCID) 20 MG tablet, Take 1 tablet by mouth 2 (Two) Times a Day., Disp: , Rfl:     folic acid (FOLVITE) 1 MG tablet, Take 1 tablet by mouth Daily., Disp: , Rfl:     furosemide (LASIX) 20 MG tablet, Take 1 tablet by mouth As Needed (LE edema)., Disp: 30 tablet, Rfl: 6    meloxicam (MOBIC) 7.5 MG tablet, Take 2 tablets by mouth Daily., Disp: , Rfl:     montelukast (SINGULAIR) 10 MG tablet, Take 1 tablet by mouth Every Night., Disp: , Rfl:     nitroglycerin (NITROSTAT) 0.4 MG SL tablet, 1 under the tongue as needed for angina, may repeat q5mins for up three doses, Disp: 100 tablet, Rfl: 11    O2 (OXYGEN), Inhale 2 L/min Every Night., Disp: , Rfl:     ondansetron (ZOFRAN) 8 MG tablet, Take 1 tablet by mouth every 8 (eight) hours as needed for nausea or vomiting., Disp: 25 tablet, Rfl: 1    Plecanatide (Trulance) 3 MG tablet, Take 1 tablet by mouth Daily., Disp: , Rfl:     sucralfate (CARAFATE) 1 g tablet, Take 1 tablet by mouth 4 (Four) Times a Day As Needed., Disp: , Rfl:     Tirzepatide (Mounjaro) 5 MG/0.5ML solution pen-injector, Inject 1 mL under the skin into the appropriate area as directed 1 (One) Time Per Week., Disp: , Rfl:     Cholecalciferol 125 MCG (5000 UT) tablet, Take 1 tablet by mouth Daily. (Patient not taking: Reported on 12/29/2023), Disp: , Rfl:     potassium chloride 10 MEQ CR tablet, TAKE 1 TABLET BY MOUTH TWICE DAILY (Patient not taking: Reported on 7/28/2023), Disp: 180 tablet, Rfl: 3    Drug Interactions  None with Repatha    Relevant Laboratory Values  Lipid Panel          3/7/2023    12:21   Lipid Panel   Total Cholesterol 149    Triglycerides 114    HDL Cholesterol 48    VLDL Cholesterol 21    LDL Cholesterol  80    LDL/HDL Ratio 1.63             Medication Assessment & Plan    Patient was last seen by cardiology on 9/7/23 and Praluent was continued. Her most recent LDL was at goal at 80 on 3/7/23.     Patient will continue Praluent 75mg every 2 weeks. She would like to continue specialty mail out pharmacy services.     A new lipid panel was ordered for the next year and patient will follow-up with cardiology on 9/9/24.     Will follow-up in 6 months, or sooner if needed.     Adele Horne RPH  12/29/2023  11:41 EST

## 2024-01-10 ENCOUNTER — SPECIALTY PHARMACY (OUTPATIENT)
Dept: PHARMACY | Facility: HOSPITAL | Age: 64
End: 2024-01-10
Payer: MEDICARE

## 2024-01-10 NOTE — PROGRESS NOTES
Specialty Pharmacy Refill Coordination Note     Em is a 63 y.o. female contacted today regarding refills of  Praluent specialty medication(s).    Reviewed and verified with patient:       Specialty medication(s) and dose(s) confirmed: yes    Refill Questions      Flowsheet Row Most Recent Value   Changes to allergies? No   Changes to medications? No   New conditions or infections since last clinic visit No   Unplanned office visit, urgent care, ED, or hospital admission in the last 4 weeks  No   How does patient/caregiver feel medication is working? Very good   Financial problems or insurance changes  No   If yes, describe changes in insurance or financial issues. na   Since the previous refill, were any specialty medication doses or scheduled injections missed or delayed?  No   If yes, please provide the amount na   Why were doses missed? na   Does this patient require a clinical escalation to a pharmacist? No            Delivery Questions      Flowsheet Row Most Recent Value   Copay verified? No   Copay amount 0   Copay form of payment No copayment ($0)              Medication Adherence          Adherence tools used: calendar      Support network for adherence: family member                Follow-up: 30 day(s)     France Alcocer Pharmacy Technician  Specialty Pharmacy Technician

## 2024-02-02 ENCOUNTER — SPECIALTY PHARMACY (OUTPATIENT)
Dept: PHARMACY | Facility: HOSPITAL | Age: 64
End: 2024-02-02
Payer: MEDICARE

## 2024-02-02 NOTE — PROGRESS NOTES
Specialty Pharmacy Refill Coordination Note     Em is a 63 y.o. female contacted today regarding refills of  praluent specialty medication(s).    Reviewed and verified with patient:       Specialty medication(s) and dose(s) confirmed: yes    Refill Questions      Flowsheet Row Most Recent Value   Changes to medications? No   New conditions or infections since last clinic visit No   Unplanned office visit, urgent care, ED, or hospital admission in the last 4 weeks  No   How does patient/caregiver feel medication is working? Very good   Financial problems or insurance changes  No   If yes, describe changes in insurance or financial issues. na   Since the previous refill, were any specialty medication doses or scheduled injections missed or delayed?  No   If yes, please provide the amount na   Why were doses missed? na   Does this patient require a clinical escalation to a pharmacist? No            Delivery Questions      Flowsheet Row Most Recent Value   Copay verified? No   Copay amount na   Copay form of payment No copayment ($0)              Medication Adherence          Adherence tools used: calendar      Support network for adherence: family member                Follow-up: 30 day(s)     France Alcocer Pharmacy Technician  Specialty Pharmacy Technician

## 2024-02-26 RX ORDER — DILTIAZEM HYDROCHLORIDE 300 MG/1
300 CAPSULE, COATED, EXTENDED RELEASE ORAL DAILY
Qty: 90 CAPSULE | Refills: 0 | Status: SHIPPED | OUTPATIENT
Start: 2024-02-26

## 2024-02-29 ENCOUNTER — SPECIALTY PHARMACY (OUTPATIENT)
Dept: PHARMACY | Facility: HOSPITAL | Age: 64
End: 2024-02-29
Payer: MEDICARE

## 2024-02-29 DIAGNOSIS — E78.5 DYSLIPIDEMIA: Primary | ICD-10-CM

## 2024-02-29 NOTE — PROGRESS NOTES
[facility]  Lipid Management Program - PCSK9i       Em Marrero is a 63 y.o. female referred to the [facility] by López Bowles for clinical pharmacy and specialty pharmacy management of PCSK9i.  Em Marrero is  treated for hyperlipidemia and currently takes Praluent 75mg SC every 2 weeks.  In the past, Pt has tried Lipitor, Crestor, and pravastatin and could not tolerate due to myalgias.  Patient has also been on Repatha in the past but had to switch to Praluent due to insurance. The patient denies any allergies to latex.      Patient reports tolerating Praluent well without any issues. She denies any side effects, missed doses, or difficulty administering medication.    Patient will be switching to Repatha now due to insurance coverage.    Relevant Past Medical History and Comorbidities  Past Medical History:   Diagnosis Date    Dyslipidemia     GERD (gastroesophageal reflux disease)     Hypertension     Hypokalemia     Palpitations      Social History     Socioeconomic History    Marital status: Legally    Tobacco Use    Smoking status: Every Day     Types: Electronic Cigarette    Smokeless tobacco: Never   Vaping Use    Vaping Use: Every day    Substances: Nicotine    Devices: Pre-filled or refillable cartridge   Substance and Sexual Activity    Alcohol use: No    Drug use: No    Sexual activity: Defer       Allergies  Coreg [carvedilol], Metoprolol, Atorvastatin, Dexamethasone, Ketorolac, Ketorolac tromethamine, and Levothyroxine sodium    Current Medication List    Current Outpatient Medications:     albuterol sulfate  (90 Base) MCG/ACT inhaler, Inhale 2 puffs Every 4 (Four) Hours As Needed for Wheezing., Disp: , Rfl:     Alirocumab (Praluent) 75 MG/ML solution auto-injector, Inject 1 mL under the skin into the appropriate area as directed Every 14 (Fourteen) Days., Disp: 6 mL, Rfl: 1    amitriptyline (ELAVIL) 50 MG tablet, Take 1 tablet by mouth every  night at bedtime., Disp: , Rfl:     ANORO ELLIPTA 62.5-25 MCG/INH aerosol powder , Inhale 1 puff Daily., Disp: , Rfl: 2    Cholecalciferol 125 MCG (5000 UT) tablet, Take 1 tablet by mouth Daily. (Patient not taking: Reported on 12/29/2023), Disp: , Rfl:     cyclobenzaprine (FLEXERIL) 10 MG tablet, Take 1 tablet by mouth Daily., Disp: , Rfl:     dexlansoprazole (DEXILANT) 60 MG capsule, Take 1 capsule by mouth Daily., Disp: , Rfl:     dilTIAZem CD (CARDIZEM CD) 300 MG 24 hr capsule, Take 1 capsule by mouth once daily, Disp: 90 capsule, Rfl: 0    famotidine (PEPCID) 20 MG tablet, Take 1 tablet by mouth 2 (Two) Times a Day., Disp: , Rfl:     folic acid (FOLVITE) 1 MG tablet, Take 1 tablet by mouth Daily., Disp: , Rfl:     furosemide (LASIX) 20 MG tablet, Take 1 tablet by mouth As Needed (LE edema)., Disp: 30 tablet, Rfl: 6    meloxicam (MOBIC) 7.5 MG tablet, Take 2 tablets by mouth Daily., Disp: , Rfl:     montelukast (SINGULAIR) 10 MG tablet, Take 1 tablet by mouth Every Night., Disp: , Rfl:     nitroglycerin (NITROSTAT) 0.4 MG SL tablet, 1 under the tongue as needed for angina, may repeat q5mins for up three doses, Disp: 100 tablet, Rfl: 11    O2 (OXYGEN), Inhale 2 L/min Every Night., Disp: , Rfl:     ondansetron (ZOFRAN) 8 MG tablet, Take 1 tablet by mouth every 8 (eight) hours as needed for nausea or vomiting., Disp: 25 tablet, Rfl: 1    Plecanatide (Trulance) 3 MG tablet, Take 1 tablet by mouth Daily., Disp: , Rfl:     potassium chloride 10 MEQ CR tablet, TAKE 1 TABLET BY MOUTH TWICE DAILY (Patient not taking: Reported on 7/28/2023), Disp: 180 tablet, Rfl: 3    sucralfate (CARAFATE) 1 g tablet, Take 1 tablet by mouth 4 (Four) Times a Day As Needed., Disp: , Rfl:     Tirzepatide (Mounjaro) 5 MG/0.5ML solution pen-injector, Inject 1 mL under the skin into the appropriate area as directed 1 (One) Time Per Week., Disp: , Rfl:     Drug Interactions  None with Repatha    Relevant Laboratory Values  Lipid Panel           3/7/2023    12:21   Lipid Panel   Total Cholesterol 149    Triglycerides 114    HDL Cholesterol 48    VLDL Cholesterol 21    LDL Cholesterol  80    LDL/HDL Ratio 1.63        Medication Assessment & Plan    Most recent LDL was 80 on 3/7/23. Patient will be switching from Praluent to Repatha due to insurance coverage.    Patient will start Repatha 140 mg every 2 weeks. Instructed patient to stop taking Praluent and to start Repatha when next dose is due. Medication education provided.    Patient will need lipid panel in 6 weeks, order placed.     Patient would like to continue receiving specialty pharmacy mail-out services.    Will follow-up in 6 months, or sooner if needed.     Tammie Keith, PharmD  2/29/2024  14:34 EST

## 2024-05-24 RX ORDER — DILTIAZEM HYDROCHLORIDE 300 MG/1
300 CAPSULE, EXTENDED RELEASE ORAL DAILY
Qty: 90 CAPSULE | Refills: 0 | Status: SHIPPED | OUTPATIENT
Start: 2024-05-24

## 2024-05-28 ENCOUNTER — SPECIALTY PHARMACY (OUTPATIENT)
Dept: PHARMACY | Facility: HOSPITAL | Age: 64
End: 2024-05-28
Payer: MEDICARE

## 2024-05-28 NOTE — PROGRESS NOTES
Specialty Pharmacy Refill Coordination Note     Em is a 63 y.o. female contacted today regarding refills of  Repatha [employer] specialty medication(s).    Reviewed and verified with patient:       Specialty medication(s) and dose(s) confirmed: yes    Refill Questions      Flowsheet Row Most Recent Value   Changes to allergies? No   Changes to medications? No   New conditions or infections since last clinic visit No   Unplanned office visit, urgent care, ED, or hospital admission in the last 4 weeks  No   How does patient/caregiver feel medication is working? Very good   Financial problems or insurance changes  No   If yes, describe changes in insurance or financial issues. na   Since the previous refill, were any specialty medication doses or scheduled injections missed or delayed?  No   If yes, please provide the amount na   Why were doses missed? na   Does this patient require a clinical escalation to a pharmacist? No            Delivery Questions      Flowsheet Row Most Recent Value   Copay verified? Yes   Copay amount 0   Copay form of payment No copayment ($0)              Medication Adherence    Adherence tools used: calendar  Support network for adherence: family member          Follow-up: 84 day(s)     Anjana Mendez, Pharmacy Technician  Specialty Pharmacy Technician

## 2024-06-18 ENCOUNTER — TRANSCRIBE ORDERS (OUTPATIENT)
Dept: ADMINISTRATIVE | Facility: HOSPITAL | Age: 64
End: 2024-06-18
Payer: MEDICARE

## 2024-06-18 DIAGNOSIS — Z78.0 POSTMENOPAUSAL: Primary | ICD-10-CM

## 2024-06-18 DIAGNOSIS — F17.210 NICOTINE DEPENDENCE, CIGARETTES, UNCOMPLICATED: Primary | ICD-10-CM

## 2024-06-27 ENCOUNTER — HOSPITAL ENCOUNTER (OUTPATIENT)
Facility: HOSPITAL | Age: 64
Discharge: HOME | End: 2024-06-27
Payer: MEDICARE

## 2024-06-27 DIAGNOSIS — Z78.0 POSTMENOPAUSAL: ICD-10-CM

## 2024-06-27 DIAGNOSIS — F17.210 NICOTINE DEPENDENCE, CIGARETTES, UNCOMPLICATED: ICD-10-CM

## 2024-06-27 PROCEDURE — 71271 CT THORAX LUNG CANCER SCR C-: CPT

## 2024-06-27 PROCEDURE — 71271 CT THORAX LUNG CANCER SCR C-: CPT | Performed by: RADIOLOGY

## 2024-06-27 PROCEDURE — 77080 DXA BONE DENSITY AXIAL: CPT | Performed by: RADIOLOGY

## 2024-06-27 PROCEDURE — 77080 DXA BONE DENSITY AXIAL: CPT

## 2024-08-21 ENCOUNTER — SPECIALTY PHARMACY (OUTPATIENT)
Dept: PHARMACY | Facility: HOSPITAL | Age: 64
End: 2024-08-21
Payer: MEDICARE

## 2024-08-21 NOTE — PROGRESS NOTES
Specialty Pharmacy Patient Management Program  Lipid Management- Repatha Reassessment     Em Marrero was referred by an their provider to the Lipid management Patient Management program offered by [facility] for hyperlipidemia. A follow-up outreach was conducted, including assessment of continued therapy appropriateness, medication adherence, and side effect incidence and management for repatha 140 mg/ml every 14 days.    Patient was previously taking Praluent 75 mg SC every 2 weeks, and was changed to Repatha 140 mg every 2 weeks on 2/29/24 due to insurance coverage requirements.    Changes to Insurance Coverage or Financial Support  No change    Relevant Past Medical History and Comorbidities  Relevant medical history and concomitant health conditions were discussed with the patient. The patient's chart has been reviewed for relevant past medical history and comorbid health conditions and updated as necessary.   Past Medical History:   Diagnosis Date    Dyslipidemia     GERD (gastroesophageal reflux disease)     Hypertension     Hypokalemia     Palpitations      Social History     Socioeconomic History    Marital status: Legally    Tobacco Use    Smoking status: Every Day     Types: Electronic Cigarette    Smokeless tobacco: Never   Vaping Use    Vaping status: Every Day    Substances: Nicotine    Devices: Pre-filled or refillable cartridge   Substance and Sexual Activity    Alcohol use: No    Drug use: No    Sexual activity: Defer     Problem list reviewed by Allegra Edgar, PharmD on 8/21/2024 at  2:30 PM    Hospitalizations and Urgent Care Since Last Assessment  ED Visits, Admissions, or Hospitalizations: none  Urgent Office Visits: none    Allergies  Known allergies and reactions were discussed with the patient. The patient's chart has been reviewed for allergy information and updated as necessary.   Allergies   Allergen Reactions    Coreg [Carvedilol] Shortness Of  Breath    Metoprolol Hives    Atorvastatin Myalgia     leg cramps    Dexamethasone Rash    Ketorolac Rash    Ketorolac Tromethamine Rash    Levothyroxine Sodium Other (See Comments)     Blister on lip  bust my lips open     Allergies reviewed by Allegra Edgar, PharmD on 8/21/2024 at  2:26 PM    Relevant Laboratory Values  Relevant laboratory values were discussed with the patient. The following specialty medication dose adjustment(s) are recommended: none    Lab Results   Component Value Date    GLUCOSE 93 03/07/2023    CALCIUM 9.1 03/07/2023     03/07/2023    K 4.5 03/07/2023    CO2 27.0 03/07/2023     03/07/2023    BUN 15 03/07/2023    CREATININE 0.75 03/07/2023    EGFRIFAFRI 71 02/05/2024    EGFRIFNONA 72 04/16/2021    BCR 20.0 03/07/2023    ANIONGAP 8.0 03/07/2023     Lab Results   Component Value Date    CHOL 149 03/07/2023    CHLPL 215 (H) 04/13/2016    TRIG 114 03/07/2023    HDL 48 03/07/2023    LDL 80 03/07/2023     Patient's last LDL 80 mg/dl on 3/7/23. Patient states she has not completed a lipid panel since then. She was informed there is a lipid panel on order for her, and was asked to complete this before her next cardiology appointment on 9/9/24. Patient was agreeable and said she will complete this on 8/26/24.    Current Medication List  This medication list has been reviewed with the patient and evaluated for any interactions or necessary modifications/recommendations, and updated to include all prescription medications, OTC medications, and supplements the patient is currently taking.  This list reflects what is contained in the patient's profile, which has also been marked as reviewed to communicate to other providers it is the most up to date version of the patient's current medication therapy.     Current Outpatient Medications:     albuterol sulfate  (90 Base) MCG/ACT inhaler, Inhale 2 puffs Every 4 (Four) Hours As Needed for Wheezing., Disp: , Rfl:     amitriptyline  (ELAVIL) 50 MG tablet, Take 1 tablet by mouth every night at bedtime., Disp: , Rfl:     ANORO ELLIPTA 62.5-25 MCG/INH aerosol powder , Inhale 1 puff Daily., Disp: , Rfl: 2    Cartia  MG 24 hr capsule, Take 1 capsule by mouth once daily, Disp: 90 capsule, Rfl: 0    Cholecalciferol 125 MCG (5000 UT) tablet, Take 1 tablet by mouth Daily. (Patient not taking: Reported on 12/29/2023), Disp: , Rfl:     cyclobenzaprine (FLEXERIL) 10 MG tablet, Take 1 tablet by mouth Daily., Disp: , Rfl:     dexlansoprazole (DEXILANT) 60 MG capsule, Take 1 capsule by mouth Daily., Disp: , Rfl:     Evolocumab (REPATHA) solution auto-injector SureClick injection, Inject 1 mL under the skin into the appropriate area as directed Every 14 (Fourteen) Days., Disp: 2 mL, Rfl: 5    famotidine (PEPCID) 20 MG tablet, Take 1 tablet by mouth 2 (Two) Times a Day., Disp: , Rfl:     folic acid (FOLVITE) 1 MG tablet, Take 1 tablet by mouth Daily., Disp: , Rfl:     furosemide (LASIX) 20 MG tablet, Take 1 tablet by mouth As Needed (LE edema)., Disp: 30 tablet, Rfl: 6    meloxicam (MOBIC) 7.5 MG tablet, Take 2 tablets by mouth Daily., Disp: , Rfl:     montelukast (SINGULAIR) 10 MG tablet, Take 1 tablet by mouth Every Night., Disp: , Rfl:     nitroglycerin (NITROSTAT) 0.4 MG SL tablet, 1 under the tongue as needed for angina, may repeat q5mins for up three doses, Disp: 100 tablet, Rfl: 11    O2 (OXYGEN), Inhale 2 L/min Every Night., Disp: , Rfl:     ondansetron (ZOFRAN) 8 MG tablet, Take 1 tablet by mouth every 8 (eight) hours as needed for nausea or vomiting., Disp: 25 tablet, Rfl: 1    Plecanatide (Trulance) 3 MG tablet, Take 1 tablet by mouth Daily., Disp: , Rfl:     potassium chloride 10 MEQ CR tablet, TAKE 1 TABLET BY MOUTH TWICE DAILY (Patient not taking: Reported on 7/28/2023), Disp: 180 tablet, Rfl: 3    sucralfate (CARAFATE) 1 g tablet, Take 1 tablet by mouth 4 (Four) Times a Day As Needed., Disp: , Rfl:     Tirzepatide (Mounjaro) 5 MG/0.5ML  solution pen-injector, Inject 1 mL under the skin into the appropriate area as directed 1 (One) Time Per Week., Disp: , Rfl:     Medicines reviewed by Allegra Edgar, PharmD on 8/21/2024 at  2:27 PM    Drug Interactions  No significant drug interactions according to literature    Adverse Drug Reactions  Medication tolerability: Tolerating with no to minimal ADRs  Medication plan: Continue therapy with normal follow-up  Plan for ADR Management: none required    Adherence, Self-Administration, and Current Therapy Problems  Adherence related to the patient's specialty therapy was discussed with the patient. The Adherence segment of this outreach has been reviewed and updated.     Adherence Questions  Linked Medication(s) Assessed: Evolocumab  On average, how many doses/injections does the patient miss per month?: 0  What are the identified reasons for non-adherence or missed doses? : no problems identified  What is the estimated medication adherence level?: %  Based on the patient/caregiver response and refill history, does this patient require an MTP to track adherence improvements?: no    Additional Barriers to Patient Self-Administration: none  Methods for Supporting Patient Self-Administration: patient writes doses on a calendar    Open Medication Therapy Problems  No medication therapy recommendations to display    Goals of Therapy  Goals related to the patient's specialty therapy were discussed with the patient. The Patient Goals segment of this outreach has been reviewed and updated.   Goals Addressed Today        Specialty Pharmacy General Goal      LDL reduction          Last LDL value 80 mg/dl on 3/2023    Quality of Life Assessment   Quality of Life related to the patient's enrollment in the patient management program and services provided was discussed with the patient. The QOL segment of this outreach has been reviewed and updated.  Quality of Life Improvement Scale: 9-A good deal  better    Reassessment Plan & Follow-Up  1. Medication Therapy Changes: none  2. Related Plans, Therapy Recommendations, or Issues to Be Addressed: need for current Lipid Panel which was discussed with patient (see above)  3. Pharmacist to perform regular assessments no more than (6) months from the previous assessment.   4. Care Coordinator to set up future refill outreaches, coordinate prescription delivery, and escalate clinical questions to pharmacist.    Attestation  Therapeutic appropriateness: Appropriate   I attest the patient was actively involved in and has agreed to the above plan of care.  If the prescribed therapy is at any point deemed not appropriate based on the current or future assessments, a consultation will be initiated with the patient's specialty care provider to determine the best course of action. The revised plan of therapy will be documented along with any required assessments and/or additional patient education provided.     Allegra Edgar, PharmD  Clinical Specialty Pharmacist  8/21/2024  14:31 EDT

## 2024-08-22 ENCOUNTER — SPECIALTY PHARMACY (OUTPATIENT)
Dept: PHARMACY | Facility: HOSPITAL | Age: 64
End: 2024-08-22
Payer: MEDICARE

## 2024-09-03 RX ORDER — DILTIAZEM HYDROCHLORIDE 300 MG/1
300 CAPSULE, EXTENDED RELEASE ORAL DAILY
Qty: 90 CAPSULE | Refills: 0 | Status: SHIPPED | OUTPATIENT
Start: 2024-09-03

## 2024-09-09 ENCOUNTER — OFFICE VISIT (OUTPATIENT)
Dept: CARDIOLOGY | Facility: CLINIC | Age: 64
End: 2024-09-09
Payer: MEDICARE

## 2024-09-09 VITALS
SYSTOLIC BLOOD PRESSURE: 117 MMHG | HEART RATE: 86 BPM | WEIGHT: 179 LBS | BODY MASS INDEX: 29.82 KG/M2 | OXYGEN SATURATION: 96 % | DIASTOLIC BLOOD PRESSURE: 74 MMHG | HEIGHT: 65 IN

## 2024-09-09 DIAGNOSIS — E78.5 DYSLIPIDEMIA: ICD-10-CM

## 2024-09-09 DIAGNOSIS — I10 ESSENTIAL HYPERTENSION: Primary | ICD-10-CM

## 2024-09-09 PROCEDURE — 3078F DIAST BP <80 MM HG: CPT | Performed by: PHYSICIAN ASSISTANT

## 2024-09-09 PROCEDURE — 93000 ELECTROCARDIOGRAM COMPLETE: CPT | Performed by: PHYSICIAN ASSISTANT

## 2024-09-09 PROCEDURE — 99214 OFFICE O/P EST MOD 30 MIN: CPT | Performed by: PHYSICIAN ASSISTANT

## 2024-09-09 PROCEDURE — 3074F SYST BP LT 130 MM HG: CPT | Performed by: PHYSICIAN ASSISTANT

## 2024-09-09 NOTE — PROGRESS NOTES
Beatriz Amaya DO  Em Marrero  1960  09/09/2024    Patient Active Problem List   Diagnosis    Palpitations    Dyslipidemia    Essential hypertension    Gastroesophageal reflux disease without esophagitis    Pulmonary emphysema    Arthritis    Hives    Neck pain    Bruit of right carotid artery    Precordial pain       Dear Beatriz Amaya DO:    Subjective     History of Present Illness:    Chief Complaint   Patient presents with    Med Management       Em Marrero is a pleasant 63 y.o. female with a past medical history significant for essential hypertension, dyslipidemia, COPD, and history of palpitations.  She comes in today for routine cardiology follow-up.     Em has no complaints with open questions. Patient denies any chest pain, shortness of breath, palpitations, dizziness, syncope or near syncope. Blood pressure has been very well controlled and cholesterol has been controlled as well with repatha.     Allergies   Allergen Reactions    Coreg [Carvedilol] Shortness Of Breath    Metoprolol Hives    Atorvastatin Myalgia     leg cramps    Dexamethasone Rash    Ketorolac Rash    Ketorolac Tromethamine Rash    Levothyroxine Sodium Other (See Comments)     Blister on lip  bust my lips open   :      Current Outpatient Medications:     albuterol sulfate  (90 Base) MCG/ACT inhaler, Inhale 2 puffs Every 4 (Four) Hours As Needed for Wheezing., Disp: , Rfl:     amitriptyline (ELAVIL) 50 MG tablet, Take 1 tablet by mouth every night at bedtime., Disp: , Rfl:     ANORO ELLIPTA 62.5-25 MCG/INH aerosol powder , Inhale 1 puff Daily., Disp: , Rfl: 2    Cartia  MG 24 hr capsule, Take 1 capsule by mouth once daily, Disp: 90 capsule, Rfl: 0    Cholecalciferol 125 MCG (5000 UT) tablet, Take 1 tablet by mouth Daily. (Patient not taking: Reported on 12/29/2023), Disp: , Rfl:     cyclobenzaprine (FLEXERIL) 10 MG tablet, Take 1 tablet by mouth Daily., Disp: , Rfl:     dexlansoprazole (DEXILANT) 60 MG  capsule, Take 1 capsule by mouth Daily., Disp: , Rfl:     Evolocumab (REPATHA) solution auto-injector SureClick injection, Inject 1 mL under the skin into the appropriate area as directed Every 14 (Fourteen) Days., Disp: 2 mL, Rfl: 5    famotidine (PEPCID) 20 MG tablet, Take 1 tablet by mouth 2 (Two) Times a Day., Disp: , Rfl:     folic acid (FOLVITE) 1 MG tablet, Take 1 tablet by mouth Daily., Disp: , Rfl:     furosemide (LASIX) 20 MG tablet, Take 1 tablet by mouth As Needed (LE edema)., Disp: 30 tablet, Rfl: 6    meloxicam (MOBIC) 7.5 MG tablet, Take 2 tablets by mouth Daily., Disp: , Rfl:     montelukast (SINGULAIR) 10 MG tablet, Take 1 tablet by mouth Every Night., Disp: , Rfl:     nitroglycerin (NITROSTAT) 0.4 MG SL tablet, 1 under the tongue as needed for angina, may repeat q5mins for up three doses, Disp: 100 tablet, Rfl: 11    O2 (OXYGEN), Inhale 2 L/min Every Night., Disp: , Rfl:     ondansetron (ZOFRAN) 8 MG tablet, Take 1 tablet by mouth every 8 (eight) hours as needed for nausea or vomiting., Disp: 25 tablet, Rfl: 1    Plecanatide (Trulance) 3 MG tablet, Take 1 tablet by mouth Daily., Disp: , Rfl:     potassium chloride 10 MEQ CR tablet, TAKE 1 TABLET BY MOUTH TWICE DAILY (Patient not taking: Reported on 7/28/2023), Disp: 180 tablet, Rfl: 3    sucralfate (CARAFATE) 1 g tablet, Take 1 tablet by mouth 4 (Four) Times a Day As Needed., Disp: , Rfl:     Tirzepatide (Mounjaro) 5 MG/0.5ML solution pen-injector, Inject 1 mL under the skin into the appropriate area as directed 1 (One) Time Per Week., Disp: , Rfl:     The following portions of the patient's history were reviewed and updated as appropriate: allergies, current medications, past family history, past medical history, past social history, past surgical history and problem list.    Social History     Tobacco Use    Smoking status: Every Day     Types: Electronic Cigarette    Smokeless tobacco: Never   Vaping Use    Vaping status: Every Day     "Substances: Nicotine    Devices: Pre-filled or refillable cartridge   Substance Use Topics    Alcohol use: No    Drug use: No         Objective   Vitals:    09/09/24 1030   BP: 117/74   BP Location: Left arm   Patient Position: Sitting   Cuff Size: Adult   Pulse: 86   SpO2: 96%   Weight: 81.2 kg (179 lb)   Height: 165.1 cm (65\")     Body mass index is 29.79 kg/m².    ROS    Constitutional:       General: Not in acute distress.     Appearance: Healthy appearance. Well-developed and not in distress. Not diaphoretic.   Eyes:      Conjunctiva/sclera: Conjunctivae normal.      Pupils: Pupils are equal, round, and reactive to light.   HENT:      Head: Normocephalic and atraumatic.   Neck:      Vascular: No carotid bruit or JVD.   Pulmonary:      Effort: Pulmonary effort is normal. No respiratory distress.      Breath sounds: Normal breath sounds.   Cardiovascular:      Normal rate. Irregular rhythm. Patient did have PVC   Edema:     Peripheral edema absent.   Skin:     General: Skin is cool.   Neurological:      Mental Status: Alert, oriented to person, place, and time and oriented to person, place and time.         Lab Results   Component Value Date     03/07/2023    K 4.5 03/07/2023     03/07/2023    CO2 27.0 03/07/2023    BUN 15 03/07/2023    CREATININE 0.75 03/07/2023    GLUCOSE 93 03/07/2023    CALCIUM 9.1 03/07/2023    AST 21 03/07/2023    ALT 33 03/07/2023    ALKPHOS 78 03/07/2023    LABIL2 1.3 (L) 04/13/2016     Lab Results   Component Value Date    CKTOTAL 130 12/23/2014     Lab Results   Component Value Date    WBC 8.29 04/16/2021    HGB 14.6 04/16/2021    HCT 44.5 04/16/2021     04/16/2021     Lab Results   Component Value Date    INR <0.90 11/24/2014    INR 0.94 08/16/2014     Lab Results   Component Value Date    MG 2.3 04/01/2021     Lab Results   Component Value Date    TSH 1.380 04/16/2021    CHLPL 215 (H) 04/13/2016    TRIG 114 03/07/2023    HDL 48 03/07/2023    LDL 80 03/07/2023    "   Lab Results   Component Value Date    BNP 7 11/24/2014       During this visit the following were done:  Labs Reviewed []    Labs Ordered []    Radiology Reports Reviewed []    Radiology Ordered []    PCP Records Reviewed []    Referring Provider Records Reviewed []    ER Records Reviewed []    Hospital Records Reviewed []    History Obtained From Family []    Radiology Images Reviewed []    Other Reviewed []    Records Requested []         ECG 12 Lead    Date/Time: 9/9/2024 10:31 AM  Performed by: López Bowles PA-C    Authorized by: López Bowles PA-C  Comparison: compared with previous ECG   Similar to previous ECG  Rhythm: sinus rhythm  ST Segments: ST segments normal    Clinical impression: normal ECG          Assessment & Plan   No diagnosis found.         Recommendations:  Essential hypertension  Excellently controlled advised her to continue monitoring at home.  Dyslipidemia  Also well-controlled on Repatha continue this  Palpitations  Minimal and infrequent.  Do not affect ADLs we will continue with Cardizem    No follow-ups on file.    As always, I appreciate very much the opportunity to participate in the cardiovascular care of your patients.      With Best Regards,    López Bowles PA-C

## 2024-11-08 ENCOUNTER — SPECIALTY PHARMACY (OUTPATIENT)
Dept: PHARMACY | Facility: HOSPITAL | Age: 64
End: 2024-11-08
Payer: MEDICARE

## 2024-11-08 NOTE — PROGRESS NOTES
[facility]ication [facility]/ Specialty Pharmacy Patient Management Program  Lipid Management Program - PCSK9i Initial Assessment   Em Marrero is a 64 y.o. female referred by their provider, López Bowles, to the Hyperlipidemia Patient Management program offered by [facility] for Lipid Management.      An initial outreach was conducted, including assessment of therapy appropriateness and specialty medication education for Repatha. The patient was introduced to services offered by [facility], including: regular assessments, refill coordination, curbside pick-up or mail order delivery options, prior authorization maintenance, and financial assistance programs as applicable. The patient was also provided with contact information for the pharmacy team.     Em Marrero is  treated for hyperlipidemia and currently takes Repatha 140mg every 2 weeks.  In the past, Pt has tried Lipitor, Crestor, and pravastatin and could not tolerate due to myalgias.  Patient has also been on Praluent in the past but had to switch due to insurance. The patient denies any allergies to latex.      Patient denies any issues with giving herself the injection and denies any missed doses or side effects.     Insurance Coverage & Financial Support  Part D     Relevant Past Medical History and Comorbidities  Relevant medical history and concomitant health conditions were discussed with the patient. The patient's chart has been reviewed for relevant past medical history and comorbid conditions and updated as necessary.  Past Medical History:   Diagnosis Date    Dyslipidemia     GERD (gastroesophageal reflux disease)     Hypertension     Hypokalemia     Palpitations      Social History     Socioeconomic History    Marital status: Legally    Tobacco Use    Smoking status: Every Day     Types: Electronic Cigarette    Smokeless tobacco: Never   Vaping Use    Vaping  status: Every Day    Substances: Nicotine    Devices: Pre-filled or refillable cartridge   Substance and Sexual Activity    Alcohol use: No    Drug use: No    Sexual activity: Defer       Problem list reviewed by Adele Horne PharmD on 11/8/2024 at 10:41 AM    Allergies  Known allergies and reactions were discussed with the patient. The patient's chart has been reviewed for  allergy information and updated as necessary.   Allergies   Allergen Reactions    Coreg [Carvedilol] Shortness Of Breath    Metoprolol Hives    Atorvastatin Myalgia     leg cramps    Dexamethasone Rash    Ketorolac Rash    Ketorolac Tromethamine Rash    Levothyroxine Sodium Other (See Comments)     Blister on lip  bust my lips open       Allergies reviewed by Adele Horne PharmD on 11/8/2024 at 10:41 AM    Relevant Laboratory Values  Relevant laboratory values were discussed with the patient. The following specialty medication dose adjustment(s) are recommended: See plan, if applicable   Lab Results   Component Value Date    CHOL 149 03/07/2023    CHLPL 215 (H) 04/13/2016    TRIG 114 03/07/2023    HDL 48 03/07/2023    LDL 80 03/07/2023       Current Medication List  This medication list has been reviewed with the patient and evaluated for any interactions or necessary modifications/recommendations, and updated to include all prescription medications, OTC medications, and supplements the patient is currently taking.  This list reflects what is contained in the patient's profile, which has also been marked as reviewed to communicate to other providers it is the most up to date version of the patient's current medication therapy.     Current Outpatient Medications:     albuterol sulfate  (90 Base) MCG/ACT inhaler, Inhale 2 puffs Every 4 (Four) Hours As Needed for Wheezing., Disp: , Rfl:     amitriptyline (ELAVIL) 50 MG tablet, Take 1 tablet by mouth every night at bedtime., Disp: , Rfl:     ANORO ELLIPTA 62.5-25 MCG/INH aerosol powder ,  Inhale 1 puff Daily., Disp: , Rfl: 2    Cartia  MG 24 hr capsule, Take 1 capsule by mouth once daily, Disp: 90 capsule, Rfl: 0    Cholecalciferol 125 MCG (5000 UT) tablet, Take 1 tablet by mouth Daily., Disp: , Rfl:     cyclobenzaprine (FLEXERIL) 10 MG tablet, Take 1 tablet by mouth Daily., Disp: , Rfl:     dexlansoprazole (DEXILANT) 60 MG capsule, Take 1 capsule by mouth Daily., Disp: , Rfl:     Evolocumab (REPATHA) solution auto-injector SureClick injection, Inject 1 mL under the skin into the appropriate area as directed Every 14 (Fourteen) Days., Disp: 6 mL, Rfl: 2    famotidine (PEPCID) 20 MG tablet, Take 1 tablet by mouth 2 (Two) Times a Day., Disp: , Rfl:     furosemide (LASIX) 20 MG tablet, Take 1 tablet by mouth As Needed (LE edema)., Disp: 30 tablet, Rfl: 6    meloxicam (MOBIC) 7.5 MG tablet, Take 2 tablets by mouth Daily., Disp: , Rfl:     montelukast (SINGULAIR) 10 MG tablet, Take 1 tablet by mouth Every Night., Disp: , Rfl:     nitroglycerin (NITROSTAT) 0.4 MG SL tablet, 1 under the tongue as needed for angina, may repeat q5mins for up three doses, Disp: 100 tablet, Rfl: 11    O2 (OXYGEN), Inhale 2 L/min Every Night., Disp: , Rfl:     ondansetron (ZOFRAN) 8 MG tablet, Take 1 tablet by mouth every 8 (eight) hours as needed for nausea or vomiting., Disp: 25 tablet, Rfl: 1    Plecanatide (Trulance) 3 MG tablet, Take 1 tablet by mouth Daily., Disp: , Rfl:     potassium chloride 10 MEQ CR tablet, TAKE 1 TABLET BY MOUTH TWICE DAILY, Disp: 180 tablet, Rfl: 3    sucralfate (CARAFATE) 1 g tablet, Take 1 tablet by mouth 4 (Four) Times a Day As Needed., Disp: , Rfl:     Tirzepatide (MOUNJARO) 10 MG/0.5ML solution pen-injector pen, Inject 0.5 mL under the skin into the appropriate area as directed 1 (One) Time Per Week., Disp: , Rfl:     Medicines reviewed by Adele Horne, PharmD on 11/8/2024 at 10:41 AM    Drug Interactions  None with Repatha    Adherence and Self-Administration  Adherence related to the  patient's specialty therapy was discussed with the patient. The Adherence segment of this outreach has been reviewed and updated.     Is there a concern with patient's ability to self administer the medication correctly and without issue?: No  Were any potential barriers to adherence identified during the initial assessment or patient education?: No  Are there any concerns regarding the patient's understanding of the importance of medication adherence?: No  Methods for Supporting Patient Adherence and/or Self-Administration: see plan, if applicable     Open Medication Therapy Problems  No medication therapy recommendations to display    Goals of Therapy  Goals related to the patient's specialty therapy were discussed with the patient. The Patient Goals segment of this outreach has been reviewed and updated.   Goals Addressed Today        Specialty Pharmacy General Goal      LDL <100    11/8/24 MM LDL is at goal at 63 on 3/14/24              Medication Assessment & Plan  Medication Therapy Changes: Patient will continue Repatha 140mg every 14 days  Injection training and medication education provided.   Pt will continue to administer at home  Welcome information and patient satisfaction survey to be sent by specialty pharmacy team with patient's initial fill.  Related Plans, Therapy Recommendations, or Therapy Problems to Be Addressed: N/A  Patient will need annual lipid panel due in March.   Patient will continue regular follow-up with cardiology- next appointment on  9/9/25  Patient will follow up with specialty pharmacy mail-out services. Care Coordinator to set up future refill outreaches, coordinate prescription delivery, and escalate clinical questions to pharmacist.  Pharmacist to perform regular assessments no more than (6) months from the previous assessment. Will follow-up in 6 months, or sooner if needed.    Initial Education Provided for Specialty Medication  The patient has been provided with the  following education and any applicable administration techniques (i.e. self-injection) have been demonstrated for the therapies indicated. All questions and concerns have been addressed prior to the patient receiving the medication, and the patient has verbalized comprehension of the education and any materials provided. Additional patient education shall be provided and documented upon request by the patient, provider, or payer.    REPATHA® (evolocumab)  Medication Expectations   Why am I taking this medication? You are taking Repatha to lower your “bad” cholesterol (LDL-C). This medication can be used in adults with high blood cholesterol including primary hyperlipidemia and familial hypercholesterolemia.    What should I expect while on this medication? You should expect to see your cholesterol improve over time. Specifically, you should see your LDL-C decrease.    How does the medication work? Repatha works by blocking a protein called PCSK9 that contributes to high levels of bad cholesterol. It helps increase your liver's ability to remove bad cholesterol from your blood.     How long will I be on this medication for? The amount of time you will be on this medication will be determined by your doctor based on your cholesterol and/or your risk of having a cardiac event. You will most likely be on this medication or another cholesterol medication throughout your lifetime. Do not abruptly stop this medication without talking to your doctor first.    How do I take this medication? Take as directed on your prescription label. Repatha is injected under the skin (subcutaneously) of your stomach, thigh, or upper arm. This medication is usually given one or twice a month.   What are some possible side effects? The most common side effects of Repatha include redness, itching, swelling, or pain/tenderness at the injection site, symptoms of the common cold, flu or flu-like symptoms or back pain.    What happens if I miss a  dose? If you miss a dose, take it as soon as you remember if it is within 7 days from the usual day of administration then resume your original schedule. If it is beyond 7 days and you use the sarah-2-week dose, skip the missed dose and resume your normal dosing schedule.If it is beyond 7 days and you use the once-monthly dose, inject the dose and start a new schedule based on that date.      Medication Safety   What are things I should warn my doctor immediately about? Talk to your doctor if you are pregnant, planning to become pregnant, or breastfeeding. Stop the medication and tell your doctor or seek emergency medical help if you notice any signs/symptoms of an allergic reaction (severe rash, redness, hives, severe itching, trouble breathing, or swelling of the face, lips, or tongue). If you have a rubber or latex allergy, you should not use the Repatha SureClick® Autoinjector pen or the prefilled syringe, please notify your doctor or pharmacist.   What are things that I should be cautious of? Be cautious of any side effects from this medication. Talk to your doctor if any new ones develop or aren't getting better.   What are some medications that can interact with this one? There are no known significant drug interactions with Repatha. Always tell your doctor or pharmacist immediately if you start taking any new medications, including over-the-counter medications, vitamins, and herbal supplements.      Medication Storage/Handling   How should I handle this medication? Do not shake or expose the pens, cartridges, or syringes to extreme heat or direct sunlight. Keep this medication out of reach of pets/children. Allow medication to warm at room temperature prior to administration.   How does this medication need to be stored? Store unused pens, cartridges, or syringes in the refrigerator in the original cartons to protect from light. If needed, Repatha may be kept at room temperature in the original carton for up  to 30 days. Do not freeze.    How should I dispose of this medication? All the Repatha devices are single-dose and should be discarded in a sharps container after use. If your doctor decides to stop this medication, take to your local police station for proper disposal. Some pharmacies also have take-back bins for medication drop-off.      Resources/Support   How can I remind myself to take this medication? You can download reminder apps to help you manage your refills. You may also set an alarm on your phone to remind you to take your dose.    Is financial support available?  [facility] can provide co-pay cards if you have commercial insurance or patient assistance if you have Medicare or no insurance.    Which vaccines are recommended for me? Talk to your doctor about these vaccines: Flu, Coronavirus (COVID-19), Pneumococcal (pneumonia), Tdap, Hepatitis B, Zoster (shingles)         Attestation  Therapeutic appropriateness: Appropriate   I attest the patient was actively involved in and has agreed to the above plan of care. If the prescribed therapy is at any point deemed not appropriate based on the current or future assessments, a consultation will be initiated with the patient's specialty care provider to determine the best course of action. The revised plan of therapy will be documented along with any required assessments and/or additional patient education provided.     Adele Horne, PharmD  11/8/2024  10:45 EST

## 2024-11-27 RX ORDER — DILTIAZEM HYDROCHLORIDE 300 MG/1
300 CAPSULE, EXTENDED RELEASE ORAL DAILY
Qty: 90 CAPSULE | Refills: 0 | Status: SHIPPED | OUTPATIENT
Start: 2024-11-27

## 2024-12-30 ENCOUNTER — OFFICE VISIT (OUTPATIENT)
Dept: SURGERY | Facility: CLINIC | Age: 64
End: 2024-12-30
Payer: MEDICARE

## 2024-12-30 VITALS
BODY MASS INDEX: 30.32 KG/M2 | SYSTOLIC BLOOD PRESSURE: 132 MMHG | HEART RATE: 90 BPM | WEIGHT: 182 LBS | HEIGHT: 65 IN | DIASTOLIC BLOOD PRESSURE: 84 MMHG

## 2024-12-30 DIAGNOSIS — K43.9 VENTRAL HERNIA WITHOUT OBSTRUCTION OR GANGRENE: ICD-10-CM

## 2024-12-30 DIAGNOSIS — K43.2 INCISIONAL HERNIA, WITHOUT OBSTRUCTION OR GANGRENE: Primary | ICD-10-CM

## 2024-12-30 PROCEDURE — 1159F MED LIST DOCD IN RCRD: CPT | Performed by: SURGERY

## 2024-12-30 PROCEDURE — 3079F DIAST BP 80-89 MM HG: CPT | Performed by: SURGERY

## 2024-12-30 PROCEDURE — 3075F SYST BP GE 130 - 139MM HG: CPT | Performed by: SURGERY

## 2024-12-30 PROCEDURE — 99203 OFFICE O/P NEW LOW 30 MIN: CPT | Performed by: SURGERY

## 2024-12-30 PROCEDURE — 1160F RVW MEDS BY RX/DR IN RCRD: CPT | Performed by: SURGERY

## 2024-12-30 NOTE — PROGRESS NOTES
Subjective   Em Marrero is a 64 y.o. female here today for possible hernia.    History of Present Illness  Ms. Marrero was seen in the office today for evaluation of multiple ventral hernias.  These have gotten slightly larger over time and patient was not like to go ahead with elective repair before the spring time comes when she is very busy outside gardening.  She has no symptoms of bowel or bladder obstruction  Allergies   Allergen Reactions    Coreg [Carvedilol] Shortness Of Breath    Metoprolol Hives    Atorvastatin Myalgia     leg cramps    Dexamethasone Rash    Ketorolac Rash    Ketorolac Tromethamine Rash    Levothyroxine Sodium Other (See Comments)     Blister on lip  bust my lips open     Current Outpatient Medications   Medication Sig Dispense Refill    albuterol sulfate  (90 Base) MCG/ACT inhaler Inhale 2 puffs Every 4 (Four) Hours As Needed for Wheezing.      amitriptyline (ELAVIL) 50 MG tablet Take 1 tablet by mouth every night at bedtime.      ANORO ELLIPTA 62.5-25 MCG/INH aerosol powder  Inhale 1 puff Daily.  2    Cartia  MG 24 hr capsule Take 1 capsule by mouth once daily 90 capsule 0    Cholecalciferol 125 MCG (5000 UT) tablet Take 1 tablet by mouth Daily.      cyclobenzaprine (FLEXERIL) 10 MG tablet Take 1 tablet by mouth Daily.      dexlansoprazole (DEXILANT) 60 MG capsule Take 1 capsule by mouth Daily.      Evolocumab (REPATHA) solution auto-injector SureClick injection Inject 1 mL under the skin into the appropriate area as directed Every 14 (Fourteen) Days. 6 mL 2    famotidine (PEPCID) 20 MG tablet Take 1 tablet by mouth 2 (Two) Times a Day.      furosemide (LASIX) 20 MG tablet Take 1 tablet by mouth As Needed (LE edema). 30 tablet 6    meloxicam (MOBIC) 7.5 MG tablet Take 2 tablets by mouth Daily.      montelukast (SINGULAIR) 10 MG tablet Take 1 tablet by mouth Every Night.      nitroglycerin (NITROSTAT) 0.4 MG SL tablet 1 under the tongue as needed for angina, may repeat q5mins  "for up three doses 100 tablet 11    O2 (OXYGEN) Inhale 2 L/min Every Night.      ondansetron (ZOFRAN) 8 MG tablet Take 1 tablet by mouth every 8 (eight) hours as needed for nausea or vomiting. 25 tablet 1    Plecanatide (Trulance) 3 MG tablet Take 1 tablet by mouth Daily.      potassium chloride 10 MEQ CR tablet TAKE 1 TABLET BY MOUTH TWICE DAILY 180 tablet 3    sucralfate (CARAFATE) 1 g tablet Take 1 tablet by mouth 4 (Four) Times a Day As Needed.      Tirzepatide (MOUNJARO) 10 MG/0.5ML solution pen-injector pen Inject 0.5 mL under the skin into the appropriate area as directed 1 (One) Time Per Week.       No current facility-administered medications for this visit.     Past Medical History:   Diagnosis Date    Dyslipidemia     GERD (gastroesophageal reflux disease)     Hypertension     Hypokalemia     Palpitations      Past Surgical History:   Procedure Laterality Date    BLADDER SURGERY      BREAST BIOPSY Right 8 YRS AGO    CHOLECYSTECTOMY      COLONOSCOPY      Negative    CORONARY ANGIOPLASTY Left 2000    HYSTERECTOMY         Pertinent Review of Systems:  Respiratory: no shortness of breath  Cardiovascular: no chest pain  Other pertinent:      Objective   /84 (BP Location: Left arm)   Pulse 90   Ht 165.1 cm (65\")   Wt 82.6 kg (182 lb)   BMI 30.29 kg/m²   Physical Exam  General:  This is a WD WN female in no acute distress  Lungs:  Respiratory effort normal. Auscultation: Clear, without wheezes, rhonchi, rales  Heart:  Regular rate and rhythm, without murmur, gallop, rub.  No pedal edema  Abdomen: Bowel sounds are present.  Patient has a large diastases but also has 2 ventral hernias 1 just slightly above the umbilicus and the other in the more superior upper midline.  Both of these are reducible.  No palpable mass.    Procedures     Results/Data:      Assessment & Plan   Multiple ventral hernias  Incidental diastases recti    CT of the abdomen and pelvis to evaluate the number and size of ventral " hernias.  Patient to return after CT for surgical planning         Discussion/Summary    Time spent:     BMI is >= 30 and <35. (Class 1 Obesity). The following options were offered after discussion;: nutrition counseling/recommendations       Future Appointments   Date Time Provider Department Center   9/9/2025 10:30 AM López Bowles PA-C [facility]         This document has been electronically signed by       December 30, 2024 14:30 EST      Please note that portions of this note were completed with a voice recognition program.

## 2024-12-31 DIAGNOSIS — K42.0 UMBILICAL HERNIA WITH OBSTRUCTION, WITHOUT GANGRENE: ICD-10-CM

## 2024-12-31 DIAGNOSIS — K42.9 UMBILICAL HERNIA WITHOUT OBSTRUCTION AND WITHOUT GANGRENE: Primary | ICD-10-CM

## 2025-01-09 ENCOUNTER — HOSPITAL ENCOUNTER (OUTPATIENT)
Dept: CT IMAGING | Facility: HOSPITAL | Age: 65
Discharge: HOME | End: 2025-01-09
Admitting: SURGERY
Payer: MEDICARE

## 2025-01-09 DIAGNOSIS — K42.9 UMBILICAL HERNIA WITHOUT OBSTRUCTION AND WITHOUT GANGRENE: ICD-10-CM

## 2025-01-09 PROCEDURE — 74176 CT ABD & PELVIS W/O CONTRAST: CPT

## 2025-01-14 ENCOUNTER — SPECIALTY PHARMACY (OUTPATIENT)
Dept: PHARMACY | Facility: HOSPITAL | Age: 65
End: 2025-01-14
Payer: MEDICARE

## 2025-01-14 NOTE — PROGRESS NOTES
Specialty Pharmacy Refill Coordination Note     Em is a 64 y.o. female contacted today regarding refills of  Repatha SureClick specialty medication(s).    Reviewed and verified with patient:       Specialty medication(s) and dose(s) confirmed: yes    Refill Questions      Flowsheet Row Most Recent Value   Changes to allergies? No   Changes to medications? No   New conditions or infections since last clinic visit No   Unplanned office visit, urgent care, ED, or hospital admission in the last 4 weeks  No   How does patient/caregiver feel medication is working? Good   Financial problems or insurance changes  No   Since the previous refill, were any specialty medication doses or scheduled injections missed or delayed?  No   Does this patient require a clinical escalation to a pharmacist? No            Delivery Questions      Flowsheet Row Most Recent Value   Delivery method [employer]   Delivery address verified with patient/caregiver? Yes   Delivery address Home   Medication(s) being filled and delivered Evolocumab (REPATHA)   Copay verified? Yes   Copay amount $0   Copay form of payment No copayment ($0)   Ship Date 1/16/25   Delivery Date 1/17/25   Signature Required No              Medication Adherence    Adherence tools used: calendar  Support network for adherence: family member          Follow-up: 84 day(s)     Anjana Mendez, Pharmacy Technician  Specialty Pharmacy Technician

## 2025-01-16 ENCOUNTER — OFFICE VISIT (OUTPATIENT)
Dept: SURGERY | Facility: CLINIC | Age: 65
End: 2025-01-16
Payer: MEDICARE

## 2025-01-16 VITALS
SYSTOLIC BLOOD PRESSURE: 108 MMHG | HEART RATE: 102 BPM | DIASTOLIC BLOOD PRESSURE: 62 MMHG | HEIGHT: 65 IN | BODY MASS INDEX: 30.99 KG/M2 | WEIGHT: 186 LBS

## 2025-01-16 DIAGNOSIS — K43.9 VENTRAL HERNIA WITHOUT OBSTRUCTION OR GANGRENE: ICD-10-CM

## 2025-01-16 DIAGNOSIS — K42.9 UMBILICAL HERNIA WITHOUT OBSTRUCTION AND WITHOUT GANGRENE: Primary | ICD-10-CM

## 2025-01-16 PROCEDURE — 1159F MED LIST DOCD IN RCRD: CPT | Performed by: SURGERY

## 2025-01-16 PROCEDURE — 1160F RVW MEDS BY RX/DR IN RCRD: CPT | Performed by: SURGERY

## 2025-01-16 PROCEDURE — 99024 POSTOP FOLLOW-UP VISIT: CPT | Performed by: SURGERY

## 2025-01-16 PROCEDURE — 3074F SYST BP LT 130 MM HG: CPT | Performed by: SURGERY

## 2025-01-16 PROCEDURE — 3078F DIAST BP <80 MM HG: CPT | Performed by: SURGERY

## 2025-01-16 NOTE — H&P (VIEW-ONLY)
Subjective  Em Marrero is a 64 y.o. female here today for CT review.    History of Present Illness  Ms. Marrero was seen in the office today for follow-up evaluation of multiple ventral hernias. These have gotten slightly larger over time and patient was not like to go ahead with elective repair before the spring time comes when she is very busy outside gardening. She has no symptoms of bowel or bladder obstruction.  She underwent CT abdomen and pelvis on 1/9/2025 for purposes of surgical planning.  Patient reports no changes in her history or examination since her last visit of 12/30/2024 other than that she has discontinued her Mounjaro since then.  Allergies   Allergen Reactions    Coreg [Carvedilol] Shortness Of Breath    Metoprolol Hives    Atorvastatin Myalgia     leg cramps    Dexamethasone Rash    Ketorolac Rash    Ketorolac Tromethamine Rash    Levothyroxine Sodium Other (See Comments)     Blister on lip  bust my lips open       Current Outpatient Medications   Medication Sig Dispense Refill    albuterol sulfate  (90 Base) MCG/ACT inhaler Inhale 2 puffs Every 4 (Four) Hours As Needed for Wheezing.      amitriptyline (ELAVIL) 50 MG tablet Take 1 tablet by mouth every night at bedtime.      ANORO ELLIPTA 62.5-25 MCG/INH aerosol powder  Inhale 1 puff Daily.  2    Cartia  MG 24 hr capsule Take 1 capsule by mouth once daily 90 capsule 0    Cholecalciferol 125 MCG (5000 UT) tablet Take 1 tablet by mouth Daily.      cyclobenzaprine (FLEXERIL) 10 MG tablet Take 1 tablet by mouth Daily.      dexlansoprazole (DEXILANT) 60 MG capsule Take 1 capsule by mouth Daily.      Evolocumab (REPATHA) solution auto-injector SureClick injection Inject 1 mL under the skin into the appropriate area as directed Every 14 (Fourteen) Days. 6 mL 2    famotidine (PEPCID) 20 MG tablet Take 1 tablet by mouth 2 (Two) Times a Day.      furosemide (LASIX) 20 MG tablet Take 1 tablet by mouth As Needed (LE edema). 30 tablet 6     "meloxicam (MOBIC) 7.5 MG tablet Take 2 tablets by mouth Daily.      montelukast (SINGULAIR) 10 MG tablet Take 1 tablet by mouth Every Night.      nitroglycerin (NITROSTAT) 0.4 MG SL tablet 1 under the tongue as needed for angina, may repeat q5mins for up three doses 100 tablet 11    O2 (OXYGEN) Inhale 2 L/min Every Night.      ondansetron (ZOFRAN) 8 MG tablet Take 1 tablet by mouth every 8 (eight) hours as needed for nausea or vomiting. 25 tablet 1    Plecanatide (Trulance) 3 MG tablet Take 1 tablet by mouth Daily.      potassium chloride 10 MEQ CR tablet TAKE 1 TABLET BY MOUTH TWICE DAILY 180 tablet 3    sucralfate (CARAFATE) 1 g tablet Take 1 tablet by mouth 4 (Four) Times a Day As Needed.      Tirzepatide (MOUNJARO) 10 MG/0.5ML solution pen-injector pen Inject 0.5 mL under the skin into the appropriate area as directed 1 (One) Time Per Week. (Patient not taking: Reported on 1/16/2025)       No current facility-administered medications for this visit.     Past Medical History:   Diagnosis Date    Dyslipidemia     GERD (gastroesophageal reflux disease)     Hypertension     Hypokalemia     Palpitations      Past Surgical History:   Procedure Laterality Date    BLADDER SURGERY      BREAST BIOPSY Right 8 YRS AGO    CHOLECYSTECTOMY      COLONOSCOPY      Negative    CORONARY ANGIOPLASTY Left 2000    HYSTERECTOMY         Pertinent Review of Systems  Negative for chest pain or shortness of breath    Objective  /62 (BP Location: Left arm)   Pulse 102   Ht 165.1 cm (65\")   Wt 84.4 kg (186 lb)   BMI 30.95 kg/m²    Physical Exam  General:  This is a WD WN female in no acute distress  Lungs:  Respiratory effort normal. Auscultation: Clear, without wheezes, rhonchi, rales  Heart:  Regular rate and rhythm, without murmur, gallop, rub.  No pedal edema  Abdomen: Bowel sounds are present.  Patient has a large diastases but also has 2 ventral hernias 1 just slightly above the umbilicus and the other in the more superior " upper midline.  Both of these are reducible.  No palpable mass.  Procedures     Results/Data:  Imaging: CT report and images from 1/9/2025 were reviewed.  I can appreciate 3 separate fascial defects.      Assessment & Plan  Multiple ventral hernias    Proceed with laparoscopic ventral hernia repair         Discussion/Summary: The risks of the surgical procedure were discussed.  Options of alternative treatments including no treatment (if applicable) were discussed.  Patient voiced understanding of the above issues and wishes to proceed    Time spent:     BMI is >= 30 and <35. (Class 1 Obesity). The following options were offered after discussion;: nutrition counseling/recommendations       @[facility]    This document has been electronically signed by        January 16, 2025 08:50 EST    Please note that portions of this note were completed with a voice recognition program.  This document has been electronically signed by lAysha BACON MD on January 16, 2025 09:03 EST

## 2025-01-16 NOTE — PROGRESS NOTES
Subjective   Em Marrero is a 64 y.o. female here today for CT review.    History of Present Illness  Ms. Marrero was seen in the office today for follow-up evaluation of multiple ventral hernias. These have gotten slightly larger over time and patient was not like to go ahead with elective repair before the spring time comes when she is very busy outside gardening. She has no symptoms of bowel or bladder obstruction.  She underwent CT abdomen and pelvis on 1/9/2025 for purposes of surgical planning.  Patient reports no changes in her history or examination since her last visit of 12/30/2024 other than that she has discontinued her Mounjaro since then.  Allergies   Allergen Reactions    Coreg [Carvedilol] Shortness Of Breath    Metoprolol Hives    Atorvastatin Myalgia     leg cramps    Dexamethasone Rash    Ketorolac Rash    Ketorolac Tromethamine Rash    Levothyroxine Sodium Other (See Comments)     Blister on lip  bust my lips open       Current Outpatient Medications   Medication Sig Dispense Refill    albuterol sulfate  (90 Base) MCG/ACT inhaler Inhale 2 puffs Every 4 (Four) Hours As Needed for Wheezing.      amitriptyline (ELAVIL) 50 MG tablet Take 1 tablet by mouth every night at bedtime.      ANORO ELLIPTA 62.5-25 MCG/INH aerosol powder  Inhale 1 puff Daily.  2    Cartia  MG 24 hr capsule Take 1 capsule by mouth once daily 90 capsule 0    Cholecalciferol 125 MCG (5000 UT) tablet Take 1 tablet by mouth Daily.      cyclobenzaprine (FLEXERIL) 10 MG tablet Take 1 tablet by mouth Daily.      dexlansoprazole (DEXILANT) 60 MG capsule Take 1 capsule by mouth Daily.      Evolocumab (REPATHA) solution auto-injector SureClick injection Inject 1 mL under the skin into the appropriate area as directed Every 14 (Fourteen) Days. 6 mL 2    famotidine (PEPCID) 20 MG tablet Take 1 tablet by mouth 2 (Two) Times a Day.      furosemide (LASIX) 20 MG tablet Take 1 tablet by mouth As Needed (LE edema). 30 tablet 6     "meloxicam (MOBIC) 7.5 MG tablet Take 2 tablets by mouth Daily.      montelukast (SINGULAIR) 10 MG tablet Take 1 tablet by mouth Every Night.      nitroglycerin (NITROSTAT) 0.4 MG SL tablet 1 under the tongue as needed for angina, may repeat q5mins for up three doses 100 tablet 11    O2 (OXYGEN) Inhale 2 L/min Every Night.      ondansetron (ZOFRAN) 8 MG tablet Take 1 tablet by mouth every 8 (eight) hours as needed for nausea or vomiting. 25 tablet 1    Plecanatide (Trulance) 3 MG tablet Take 1 tablet by mouth Daily.      potassium chloride 10 MEQ CR tablet TAKE 1 TABLET BY MOUTH TWICE DAILY 180 tablet 3    sucralfate (CARAFATE) 1 g tablet Take 1 tablet by mouth 4 (Four) Times a Day As Needed.      Tirzepatide (MOUNJARO) 10 MG/0.5ML solution pen-injector pen Inject 0.5 mL under the skin into the appropriate area as directed 1 (One) Time Per Week. (Patient not taking: Reported on 1/16/2025)       No current facility-administered medications for this visit.     Past Medical History:   Diagnosis Date    Dyslipidemia     GERD (gastroesophageal reflux disease)     Hypertension     Hypokalemia     Palpitations      Past Surgical History:   Procedure Laterality Date    BLADDER SURGERY      BREAST BIOPSY Right 8 YRS AGO    CHOLECYSTECTOMY      COLONOSCOPY      Negative    CORONARY ANGIOPLASTY Left 2000    HYSTERECTOMY         Pertinent Review of Systems  Negative for chest pain or shortness of breath    Objective   /62 (BP Location: Left arm)   Pulse 102   Ht 165.1 cm (65\")   Wt 84.4 kg (186 lb)   BMI 30.95 kg/m²    Physical Exam  General:  This is a WD WN female in no acute distress  Lungs:  Respiratory effort normal. Auscultation: Clear, without wheezes, rhonchi, rales  Heart:  Regular rate and rhythm, without murmur, gallop, rub.  No pedal edema  Abdomen: Bowel sounds are present.  Patient has a large diastases but also has 2 ventral hernias 1 just slightly above the umbilicus and the other in the more superior " upper midline.  Both of these are reducible.  No palpable mass.  Procedures     Results/Data:  Imaging: CT report and images from 1/9/2025 were reviewed.  I can appreciate 3 separate fascial defects.      Assessment & Plan   Multiple ventral hernias    Proceed with laparoscopic ventral hernia repair         Discussion/Summary: The risks of the surgical procedure were discussed.  Options of alternative treatments including no treatment (if applicable) were discussed.  Patient voiced understanding of the above issues and wishes to proceed    Time spent:     BMI is >= 30 and <35. (Class 1 Obesity). The following options were offered after discussion;: nutrition counseling/recommendations       @[facility]    This document has been electronically signed by        January 16, 2025 08:50 EST    Please note that portions of this note were completed with a voice recognition program.

## 2025-01-16 NOTE — H&P
Subjective  Em Marrero is a 64 y.o. female here today for CT review.    History of Present Illness  Ms. Marrero was seen in the office today for follow-up evaluation of multiple ventral hernias. These have gotten slightly larger over time and patient was not like to go ahead with elective repair before the spring time comes when she is very busy outside gardening. She has no symptoms of bowel or bladder obstruction.  She underwent CT abdomen and pelvis on 1/9/2025 for purposes of surgical planning.  Patient reports no changes in her history or examination since her last visit of 12/30/2024 other than that she has discontinued her Mounjaro since then.  Allergies   Allergen Reactions    Coreg [Carvedilol] Shortness Of Breath    Metoprolol Hives    Atorvastatin Myalgia     leg cramps    Dexamethasone Rash    Ketorolac Rash    Ketorolac Tromethamine Rash    Levothyroxine Sodium Other (See Comments)     Blister on lip  bust my lips open       Current Outpatient Medications   Medication Sig Dispense Refill    albuterol sulfate  (90 Base) MCG/ACT inhaler Inhale 2 puffs Every 4 (Four) Hours As Needed for Wheezing.      amitriptyline (ELAVIL) 50 MG tablet Take 1 tablet by mouth every night at bedtime.      ANORO ELLIPTA 62.5-25 MCG/INH aerosol powder  Inhale 1 puff Daily.  2    Cartia  MG 24 hr capsule Take 1 capsule by mouth once daily 90 capsule 0    Cholecalciferol 125 MCG (5000 UT) tablet Take 1 tablet by mouth Daily.      cyclobenzaprine (FLEXERIL) 10 MG tablet Take 1 tablet by mouth Daily.      dexlansoprazole (DEXILANT) 60 MG capsule Take 1 capsule by mouth Daily.      Evolocumab (REPATHA) solution auto-injector SureClick injection Inject 1 mL under the skin into the appropriate area as directed Every 14 (Fourteen) Days. 6 mL 2    famotidine (PEPCID) 20 MG tablet Take 1 tablet by mouth 2 (Two) Times a Day.      furosemide (LASIX) 20 MG tablet Take 1 tablet by mouth As Needed (LE edema). 30 tablet 6     "meloxicam (MOBIC) 7.5 MG tablet Take 2 tablets by mouth Daily.      montelukast (SINGULAIR) 10 MG tablet Take 1 tablet by mouth Every Night.      nitroglycerin (NITROSTAT) 0.4 MG SL tablet 1 under the tongue as needed for angina, may repeat q5mins for up three doses 100 tablet 11    O2 (OXYGEN) Inhale 2 L/min Every Night.      ondansetron (ZOFRAN) 8 MG tablet Take 1 tablet by mouth every 8 (eight) hours as needed for nausea or vomiting. 25 tablet 1    Plecanatide (Trulance) 3 MG tablet Take 1 tablet by mouth Daily.      potassium chloride 10 MEQ CR tablet TAKE 1 TABLET BY MOUTH TWICE DAILY 180 tablet 3    sucralfate (CARAFATE) 1 g tablet Take 1 tablet by mouth 4 (Four) Times a Day As Needed.      Tirzepatide (MOUNJARO) 10 MG/0.5ML solution pen-injector pen Inject 0.5 mL under the skin into the appropriate area as directed 1 (One) Time Per Week. (Patient not taking: Reported on 1/16/2025)       No current facility-administered medications for this visit.     Past Medical History:   Diagnosis Date    Dyslipidemia     GERD (gastroesophageal reflux disease)     Hypertension     Hypokalemia     Palpitations      Past Surgical History:   Procedure Laterality Date    BLADDER SURGERY      BREAST BIOPSY Right 8 YRS AGO    CHOLECYSTECTOMY      COLONOSCOPY      Negative    CORONARY ANGIOPLASTY Left 2000    HYSTERECTOMY         Pertinent Review of Systems  Negative for chest pain or shortness of breath    Objective  /62 (BP Location: Left arm)   Pulse 102   Ht 165.1 cm (65\")   Wt 84.4 kg (186 lb)   BMI 30.95 kg/m²    Physical Exam  General:  This is a WD WN female in no acute distress  Lungs:  Respiratory effort normal. Auscultation: Clear, without wheezes, rhonchi, rales  Heart:  Regular rate and rhythm, without murmur, gallop, rub.  No pedal edema  Abdomen: Bowel sounds are present.  Patient has a large diastases but also has 2 ventral hernias 1 just slightly above the umbilicus and the other in the more superior " upper midline.  Both of these are reducible.  No palpable mass.  Procedures     Results/Data:  Imaging: CT report and images from 1/9/2025 were reviewed.  I can appreciate 3 separate fascial defects.      Assessment & Plan  Multiple ventral hernias    Proceed with laparoscopic ventral hernia repair         Discussion/Summary: The risks of the surgical procedure were discussed.  Options of alternative treatments including no treatment (if applicable) were discussed.  Patient voiced understanding of the above issues and wishes to proceed    Time spent:     BMI is >= 30 and <35. (Class 1 Obesity). The following options were offered after discussion;: nutrition counseling/recommendations       @[facility]    This document has been electronically signed by        January 16, 2025 08:50 EST    Please note that portions of this note were completed with a voice recognition program.  This document has been electronically signed by Alysha BACON MD on January 16, 2025 09:03 EST

## 2025-01-17 ENCOUNTER — PRE-ADMISSION TESTING (OUTPATIENT)
Dept: PREADMISSION TESTING | Facility: HOSPITAL | Age: 65
End: 2025-01-17
Payer: MEDICARE

## 2025-01-17 VITALS — WEIGHT: 186 LBS | HEIGHT: 65 IN | BODY MASS INDEX: 30.99 KG/M2

## 2025-01-17 DIAGNOSIS — K43.9 VENTRAL HERNIA WITHOUT OBSTRUCTION OR GANGRENE: ICD-10-CM

## 2025-01-17 DIAGNOSIS — K42.9 UMBILICAL HERNIA WITHOUT OBSTRUCTION AND WITHOUT GANGRENE: ICD-10-CM

## 2025-01-17 LAB
ANION GAP SERPL CALCULATED.3IONS-SCNC: 8.5 MMOL/L (ref 5–15)
BUN SERPL-MCNC: 17 MG/DL (ref 8–23)
BUN/CREAT SERPL: 22.7 (ref 7–25)
CALCIUM SPEC-SCNC: 9.1 MG/DL (ref 8.6–10.5)
CHLORIDE SERPL-SCNC: 104 MMOL/L (ref 98–107)
CO2 SERPL-SCNC: 23.5 MMOL/L (ref 22–29)
CREAT SERPL-MCNC: 0.75 MG/DL (ref 0.57–1)
DEPRECATED RDW RBC AUTO: 40.5 FL (ref 37–54)
EGFRCR SERPLBLD CKD-EPI 2021: 88.5 ML/MIN/1.73
ERYTHROCYTE [DISTWIDTH] IN BLOOD BY AUTOMATED COUNT: 13.4 % (ref 12.3–15.4)
GLUCOSE SERPL-MCNC: 128 MG/DL (ref 65–99)
HCT VFR BLD AUTO: 41.4 % (ref 34–46.6)
HGB BLD-MCNC: 12.9 G/DL (ref 12–15.9)
MCH RBC QN AUTO: 25.9 PG (ref 26.6–33)
MCHC RBC AUTO-ENTMCNC: 31.2 G/DL (ref 31.5–35.7)
MCV RBC AUTO: 83 FL (ref 79–97)
PLATELET # BLD AUTO: 278 10*3/MM3 (ref 140–450)
PMV BLD AUTO: 10.2 FL (ref 6–12)
POTASSIUM SERPL-SCNC: 4.4 MMOL/L (ref 3.5–5.2)
RBC # BLD AUTO: 4.99 10*6/MM3 (ref 3.77–5.28)
SODIUM SERPL-SCNC: 136 MMOL/L (ref 136–145)
WBC NRBC COR # BLD AUTO: 8.35 10*3/MM3 (ref 3.4–10.8)

## 2025-01-17 PROCEDURE — 36415 COLL VENOUS BLD VENIPUNCTURE: CPT

## 2025-01-17 PROCEDURE — 85027 COMPLETE CBC AUTOMATED: CPT

## 2025-01-17 PROCEDURE — 80048 BASIC METABOLIC PNL TOTAL CA: CPT

## 2025-01-17 NOTE — DISCHARGE INSTRUCTIONS
TAKE the following medications the morning of surgery:  1/21/2025    All heart or blood pressure medications    Please discontinue all blood thinners and anticoagulants (except aspirin) prior to surgery as per your surgeon and cardiologist instructions.  Aspirin may be continued up to the day prior to surgery.    HOLD all diabetic medications the morning of surgery as order by physician.    Please follow instructions on use of prep cloths provided by nurse. Return instruction sheet to pre-op nurse on day of surgery.    Arrival time for surgery on   will be given to you by Dr. Sheets.    A RESPONSIBLE PERSON MUST REMAIN IN THE WAITING ROOM DURING YOUR PROCEDURE AND A RESPONSIBLE  MUST BE AVAILABLE UPON YOUR DISCHARGE.    General Instructions:  1/20/2025  Do NOT eat or drink after midnight which includes water, mints, or gum.  You may brush your teeth. Dental appliances that are removable must be taken out day of surgery.  Do NOT smoke, chew tobacco, or drink alcohol within 24 hours prior to surgery.  Bring medications in original bottles, any inhalers and if applicable your C-PAP/BI-PAP machine  Bring any papers given to you in the doctor’s office  Wear clean, comfortable clothes and socks  Do NOT wear contact lenses or make-up or dark nail polish.  Bring a case for your glasses if applicable.  Bring crutches or walker if applicable  Leave all other valuables and jewelry at home  If you were given a blood bank armband, continue to wear it until discharged.    Preventing a Surgical Site Infection:  Shower the night before surgery (unless instructed otherwise) using a fresh bar of anti-bacterial soap (such as Dial) and clean washcloth.  Dry with a clean towel and dress in clean clothing.  For 2 to 3 days before surgery, avoid shaving with a razor near where you will have surgery because the razor can irritate skin and make it easier to develop an infection.  Ask your surgeon if you will be receiving antibiotics  prior to surgery.  Make sure you, your family, and all healthcare providers clean their hands with soap and water or an alcohol-based hand  before caring for you or your wound.  If at all possible, quit smoking as many days before surgery as you can.    Day of Surgery:  Upon arrival, a pre-op nurse and anesthesiologist will review your health history, obtain vital signs, and answer questions you may have.  The only belongings needed at this time will be your home medications and if applicable you C-PAP/BI-PAP machine.  If you are staying overnight, your family can leave the rest of your belongings in the car and bring them to your room later.  A pre-op nurse will start an IV and you may receive medication in preparation for surgery.  Due to patient privacy and limited space, only one member of your family will be able to accompany you in the pre-op area.  While you are in surgery your family should notify the waiting room  if they leave the waiting room area and provide a contact number.  Please be aware that surgery does come with discomfort.  We want to make every effort to control your discomfort so please discuss any uncontrolled symptoms with your nurse.  Your doctor will most likely have prescribed pain medications.  If you are going home after surgery you will receive individualized written care instructions before being discharged.  A responsible adult must drive you to and from the hospital on the day of surgery and stay with you for 24 hours.  If you are staying overnight following surgery, you will be transported to your hospital room following the recovery period.

## 2025-01-20 ENCOUNTER — TELEPHONE (OUTPATIENT)
Dept: SURGERY | Facility: CLINIC | Age: 65
End: 2025-01-20
Payer: MEDICARE

## 2025-01-21 ENCOUNTER — HOSPITAL ENCOUNTER (OUTPATIENT)
Facility: HOSPITAL | Age: 65
Setting detail: HOSPITAL OUTPATIENT SURGERY
Discharge: HOME | End: 2025-01-21
Attending: SURGERY | Admitting: SURGERY
Payer: MEDICARE

## 2025-01-21 ENCOUNTER — ANESTHESIA EVENT (OUTPATIENT)
Dept: PERIOP | Facility: HOSPITAL | Age: 65
End: 2025-01-21
Payer: MEDICARE

## 2025-01-21 ENCOUNTER — ANESTHESIA (OUTPATIENT)
Dept: PERIOP | Facility: HOSPITAL | Age: 65
End: 2025-01-21
Payer: MEDICARE

## 2025-01-21 ENCOUNTER — APPOINTMENT (OUTPATIENT)
Dept: GENERAL RADIOLOGY | Facility: HOSPITAL | Age: 65
End: 2025-01-21
Payer: MEDICARE

## 2025-01-21 VITALS
BODY MASS INDEX: 31.49 KG/M2 | WEIGHT: 189 LBS | RESPIRATION RATE: 16 BRPM | SYSTOLIC BLOOD PRESSURE: 118 MMHG | HEART RATE: 70 BPM | TEMPERATURE: 97.9 F | DIASTOLIC BLOOD PRESSURE: 72 MMHG | OXYGEN SATURATION: 93 % | HEIGHT: 65 IN

## 2025-01-21 DIAGNOSIS — K43.9 VENTRAL HERNIA WITHOUT OBSTRUCTION OR GANGRENE: ICD-10-CM

## 2025-01-21 DIAGNOSIS — K42.9 UMBILICAL HERNIA WITHOUT OBSTRUCTION AND WITHOUT GANGRENE: ICD-10-CM

## 2025-01-21 LAB — GLUCOSE BLDC GLUCOMTR-MCNC: 148 MG/DL (ref 70–130)

## 2025-01-21 PROCEDURE — 25010000002 LIDOCAINE PF 2% 2 % SOLUTION: Performed by: NURSE ANESTHETIST, CERTIFIED REGISTERED

## 2025-01-21 PROCEDURE — 25010000002 NEOSTIGMINE 10 MG/10ML SOLUTION: Performed by: NURSE ANESTHETIST, CERTIFIED REGISTERED

## 2025-01-21 PROCEDURE — 25010000002 ROPIVACAINE PER 1 MG: Performed by: ANESTHESIOLOGY

## 2025-01-21 PROCEDURE — C1781 MESH (IMPLANTABLE): HCPCS | Performed by: SURGERY

## 2025-01-21 PROCEDURE — 25010000002 PROPOFOL 200 MG/20ML EMULSION: Performed by: NURSE ANESTHETIST, CERTIFIED REGISTERED

## 2025-01-21 PROCEDURE — 25010000002 CEFAZOLIN PER 500 MG: Performed by: SURGERY

## 2025-01-21 PROCEDURE — 25010000002 PROCHLORPERAZINE 10 MG/2ML SOLUTION: Performed by: ANESTHESIOLOGY

## 2025-01-21 PROCEDURE — 25010000002 ONDANSETRON PER 1 MG: Performed by: NURSE ANESTHETIST, CERTIFIED REGISTERED

## 2025-01-21 PROCEDURE — 63710000001 ONDANSETRON ODT 4 MG TABLET DISPERSIBLE: Performed by: ANESTHESIOLOGY

## 2025-01-21 PROCEDURE — 82948 REAGENT STRIP/BLOOD GLUCOSE: CPT

## 2025-01-21 PROCEDURE — 25010000002 FENTANYL CITRATE (PF) 50 MCG/ML SOLUTION: Performed by: NURSE ANESTHETIST, CERTIFIED REGISTERED

## 2025-01-21 PROCEDURE — 25010000002 DEXAMETHASONE PER 1 MG: Performed by: NURSE ANESTHETIST, CERTIFIED REGISTERED

## 2025-01-21 PROCEDURE — 25010000002 GLYCOPYRROLATE 0.4 MG/2ML SOLUTION: Performed by: NURSE ANESTHETIST, CERTIFIED REGISTERED

## 2025-01-21 DEVICE — VENTRALIGHT ST MESH WITH ECHO 2 POSITIONING SYSTEM, 6" X 8" (15 X 20 CM), ELLIPSE
Type: IMPLANTABLE DEVICE | Site: ABDOMEN | Status: FUNCTIONAL
Brand: VENTRALIGHT

## 2025-01-21 DEVICE — OPTIFIX AT ABSORBABLE FIXATION SYSTEM WITH ARTICULATING TECHNOLOGY, 37 CM LENGTH, 5 MM DIAMETER, 30 FASTENERS
Type: IMPLANTABLE DEVICE | Site: ABDOMEN | Status: FUNCTIONAL
Brand: OPTIFIX

## 2025-01-21 RX ORDER — LIDOCAINE HYDROCHLORIDE 20 MG/ML
INJECTION, SOLUTION EPIDURAL; INFILTRATION; INTRACAUDAL; PERINEURAL AS NEEDED
Status: DISCONTINUED | OUTPATIENT
Start: 2025-01-21 | End: 2025-01-21 | Stop reason: SURG

## 2025-01-21 RX ORDER — MEPERIDINE HYDROCHLORIDE 25 MG/ML
12.5 INJECTION INTRAMUSCULAR; INTRAVENOUS; SUBCUTANEOUS
Status: DISCONTINUED | OUTPATIENT
Start: 2025-01-21 | End: 2025-01-21 | Stop reason: HOSPADM

## 2025-01-21 RX ORDER — PROCHLORPERAZINE EDISYLATE 5 MG/ML
5 INJECTION INTRAMUSCULAR; INTRAVENOUS ONCE
Status: COMPLETED | OUTPATIENT
Start: 2025-01-21 | End: 2025-01-21

## 2025-01-21 RX ORDER — GLYCOPYRROLATE 0.2 MG/ML
INJECTION INTRAMUSCULAR; INTRAVENOUS AS NEEDED
Status: DISCONTINUED | OUTPATIENT
Start: 2025-01-21 | End: 2025-01-21 | Stop reason: SURG

## 2025-01-21 RX ORDER — PROPOFOL 10 MG/ML
INJECTION, EMULSION INTRAVENOUS AS NEEDED
Status: DISCONTINUED | OUTPATIENT
Start: 2025-01-21 | End: 2025-01-21 | Stop reason: SURG

## 2025-01-21 RX ORDER — OXYCODONE AND ACETAMINOPHEN 5; 325 MG/1; MG/1
1 TABLET ORAL EVERY 6 HOURS PRN
Qty: 20 TABLET | Refills: 0 | Status: SHIPPED | OUTPATIENT
Start: 2025-01-21

## 2025-01-21 RX ORDER — OXYCODONE AND ACETAMINOPHEN 5; 325 MG/1; MG/1
1 TABLET ORAL ONCE AS NEEDED
Status: DISCONTINUED | OUTPATIENT
Start: 2025-01-21 | End: 2025-01-21 | Stop reason: HOSPADM

## 2025-01-21 RX ORDER — NEOSTIGMINE METHYLSULFATE 1 MG/ML
INJECTION INTRAVENOUS AS NEEDED
Status: DISCONTINUED | OUTPATIENT
Start: 2025-01-21 | End: 2025-01-21 | Stop reason: SURG

## 2025-01-21 RX ORDER — FENTANYL CITRATE 50 UG/ML
INJECTION, SOLUTION INTRAMUSCULAR; INTRAVENOUS AS NEEDED
Status: DISCONTINUED | OUTPATIENT
Start: 2025-01-21 | End: 2025-01-21 | Stop reason: SURG

## 2025-01-21 RX ORDER — ONDANSETRON 4 MG/1
4 TABLET, ORALLY DISINTEGRATING ORAL ONCE
Status: COMPLETED | OUTPATIENT
Start: 2025-01-21 | End: 2025-01-21

## 2025-01-21 RX ORDER — IPRATROPIUM BROMIDE AND ALBUTEROL SULFATE 2.5; .5 MG/3ML; MG/3ML
3 SOLUTION RESPIRATORY (INHALATION) ONCE AS NEEDED
Status: DISCONTINUED | OUTPATIENT
Start: 2025-01-21 | End: 2025-01-21 | Stop reason: HOSPADM

## 2025-01-21 RX ORDER — DEXAMETHASONE SODIUM PHOSPHATE 4 MG/ML
INJECTION, SOLUTION INTRA-ARTICULAR; INTRALESIONAL; INTRAMUSCULAR; INTRAVENOUS; SOFT TISSUE AS NEEDED
Status: DISCONTINUED | OUTPATIENT
Start: 2025-01-21 | End: 2025-01-21 | Stop reason: SURG

## 2025-01-21 RX ORDER — SODIUM CHLORIDE 0.9 % (FLUSH) 0.9 %
10 SYRINGE (ML) INJECTION AS NEEDED
Status: DISCONTINUED | OUTPATIENT
Start: 2025-01-21 | End: 2025-01-21 | Stop reason: HOSPADM

## 2025-01-21 RX ORDER — ONDANSETRON 2 MG/ML
4 INJECTION INTRAMUSCULAR; INTRAVENOUS AS NEEDED
Status: DISCONTINUED | OUTPATIENT
Start: 2025-01-21 | End: 2025-01-21 | Stop reason: HOSPADM

## 2025-01-21 RX ORDER — MIDAZOLAM HYDROCHLORIDE 1 MG/ML
1 INJECTION, SOLUTION INTRAMUSCULAR; INTRAVENOUS
Status: DISCONTINUED | OUTPATIENT
Start: 2025-01-21 | End: 2025-01-21 | Stop reason: HOSPADM

## 2025-01-21 RX ORDER — PHENYLEPHRINE HCL IN 0.9% NACL 1 MG/10 ML
SYRINGE (ML) INTRAVENOUS AS NEEDED
Status: DISCONTINUED | OUTPATIENT
Start: 2025-01-21 | End: 2025-01-21 | Stop reason: SURG

## 2025-01-21 RX ORDER — ROPIVACAINE HYDROCHLORIDE 5 MG/ML
INJECTION, SOLUTION EPIDURAL; INFILTRATION; PERINEURAL
Status: COMPLETED | OUTPATIENT
Start: 2025-01-21 | End: 2025-01-21

## 2025-01-21 RX ORDER — SODIUM CHLORIDE 9 MG/ML
40 INJECTION, SOLUTION INTRAVENOUS AS NEEDED
Status: DISCONTINUED | OUTPATIENT
Start: 2025-01-21 | End: 2025-01-21 | Stop reason: HOSPADM

## 2025-01-21 RX ORDER — SODIUM CHLORIDE, SODIUM LACTATE, POTASSIUM CHLORIDE, CALCIUM CHLORIDE 600; 310; 30; 20 MG/100ML; MG/100ML; MG/100ML; MG/100ML
125 INJECTION, SOLUTION INTRAVENOUS ONCE
Status: DISCONTINUED | OUTPATIENT
Start: 2025-01-21 | End: 2025-01-21 | Stop reason: HOSPADM

## 2025-01-21 RX ORDER — ROCURONIUM BROMIDE 10 MG/ML
INJECTION, SOLUTION INTRAVENOUS AS NEEDED
Status: DISCONTINUED | OUTPATIENT
Start: 2025-01-21 | End: 2025-01-21 | Stop reason: SURG

## 2025-01-21 RX ORDER — MIDAZOLAM HYDROCHLORIDE 1 MG/ML
0.5 INJECTION, SOLUTION INTRAMUSCULAR; INTRAVENOUS
Status: DISCONTINUED | OUTPATIENT
Start: 2025-01-21 | End: 2025-01-21 | Stop reason: HOSPADM

## 2025-01-21 RX ORDER — FAMOTIDINE 10 MG/ML
INJECTION, SOLUTION INTRAVENOUS AS NEEDED
Status: DISCONTINUED | OUTPATIENT
Start: 2025-01-21 | End: 2025-01-21 | Stop reason: SURG

## 2025-01-21 RX ORDER — FENTANYL CITRATE 50 UG/ML
50 INJECTION, SOLUTION INTRAMUSCULAR; INTRAVENOUS
Status: DISCONTINUED | OUTPATIENT
Start: 2025-01-21 | End: 2025-01-21 | Stop reason: HOSPADM

## 2025-01-21 RX ORDER — SODIUM CHLORIDE 0.9 % (FLUSH) 0.9 %
10 SYRINGE (ML) INJECTION EVERY 12 HOURS SCHEDULED
Status: DISCONTINUED | OUTPATIENT
Start: 2025-01-21 | End: 2025-01-21 | Stop reason: HOSPADM

## 2025-01-21 RX ADMIN — GLYCOPYRROLATE 0.4 MG: 0.2 INJECTION INTRAMUSCULAR; INTRAVENOUS at 14:48

## 2025-01-21 RX ADMIN — PROCHLORPERAZINE EDISYLATE 5 MG: 5 INJECTION INTRAMUSCULAR; INTRAVENOUS at 16:31

## 2025-01-21 RX ADMIN — Medication 100 MCG: at 14:17

## 2025-01-21 RX ADMIN — ONDANSETRON 4 MG: 4 TABLET, ORALLY DISINTEGRATING ORAL at 16:07

## 2025-01-21 RX ADMIN — PROPOFOL 180 MG: 10 INJECTION, EMULSION INTRAVENOUS at 13:09

## 2025-01-21 RX ADMIN — FENTANYL CITRATE 100 MCG: 50 INJECTION INTRAMUSCULAR; INTRAVENOUS at 13:09

## 2025-01-21 RX ADMIN — LIDOCAINE HYDROCHLORIDE 50 MG: 20 INJECTION, SOLUTION EPIDURAL; INFILTRATION; INTRACAUDAL; PERINEURAL at 13:09

## 2025-01-21 RX ADMIN — DEXAMETHASONE SODIUM PHOSPHATE 4 MG: 4 INJECTION INTRA-ARTICULAR; INTRALESIONAL; INTRAMUSCULAR; INTRAVENOUS; SOFT TISSUE at 13:09

## 2025-01-21 RX ADMIN — FAMOTIDINE 204 MG: 10 INJECTION, SOLUTION INTRAVENOUS at 13:09

## 2025-01-21 RX ADMIN — NEOSTIGMINE METHYLSULFATE 2 MG: 0.5 INJECTION INTRAVENOUS at 14:48

## 2025-01-21 RX ADMIN — ONDANSETRON 4 MG: 2 INJECTION INTRAMUSCULAR; INTRAVENOUS at 15:48

## 2025-01-21 RX ADMIN — CEFAZOLIN 2000 MG: 2 INJECTION, POWDER, FOR SOLUTION INTRAMUSCULAR; INTRAVENOUS at 13:09

## 2025-01-21 RX ADMIN — ROPIVACAINE HYDROCHLORIDE 200 MG: 5 INJECTION, SOLUTION EPIDURAL; INFILTRATION; PERINEURAL at 13:15

## 2025-01-21 RX ADMIN — ROCURONIUM BROMIDE 40 MG: 10 SOLUTION INTRAVENOUS at 13:09

## 2025-01-21 NOTE — OP NOTE
Laparoscopic repair of multiple incisional hernias    Pre-op: Multiple symptomatic incisional hernias    Post-op:  Same    Surgeon:  Alysha Sykes M.D., F.A.C.S.    Assistant: Praveena    Anesthesia:  general with block      Indications: Multiple incisional hernias       Procedure Details   After obtaining informed consent and receiving preoperative antibiotics and with venous compression boots in place, the patient was taken to the operating room and placed under anesthesia.  Vu catheter was placed and the abdomen was prepped and draped in a sterile fashion.  An incision was made in the left lateral abdomen and the Veress needle was inserted.  Pneumoperitoneum was obtained to 15 mmHg and the 5 mm trocar was placed.  Camera was inserted, confirming position within the abdomen.  A total of 3 hernias were identified, 2 of which contained incarcerated fat.  The distance between the most superior most inferior hernia was 10 cm.  The hernia contents were divided with the harmonic scalpel and the reduced.  Based on the maximal distance between the 2 hernias of 10 cm of the 20 x 15 oval echo mesh was utilized.  Stay sutures were placed at 12, 3, 6 and 9:00.  A third 10 mm right lateral trocar was placed.  Mesh was placed in the abdomen.  A stab wound was made in the center of the defect and the anchoring suture from the echo mesh was brought out through this.  Following this there were 4 stab wounds made in the skin at 3, 6, 9 and 12:00 and using the suture grasper from the Chute Eller device the sutures were brought up from the abdomen to the skin in the 4 quadrants.  Once this was accomplished there was noted to be good coverage of the defect.  The frame of the mesh was then removed.  The 4 anchoring sutures were secured and with a total of 30 tacks and the mesh was secured to the abdominal wall.  There was excellent coverage of the mesh.  Trocars were removed and the largest 10 mm trocar was closed with a Dani  Eller device.  All incisions were closed with 4 and 5-0 Monocryl.  Dressings and binder were placed.  Patient tolerated the procedure well was taken to the recovery room in stable condition    Findings:  3 separate incisional hernias, 2 of which were incarcerated         Estimated Blood Loss:  Minimal    Blood Administered: none           Drains: none           Specimens: None     Grafts and Implants: No       Complications:  none           Disposition: PACU - hemodynamically stable.           Condition: stable

## 2025-01-21 NOTE — ANESTHESIA POSTPROCEDURE EVALUATION
Patient: Em Marrero    Procedure Summary       Date: 01/21/25 Room / Location:  [facility] 01 /  [facility]    Anesthesia Start: 1302 Anesthesia Stop: 1454    Procedure: VENTRAL HERNIA REPAIR LAPAROSCOPIC POSSIBLE OPEN (Abdomen) Diagnosis:       Umbilical hernia without obstruction and without gangrene      Ventral hernia without obstruction or gangrene      (Umbilical hernia without obstruction and without gangrene [K42.9])      (Ventral hernia without obstruction or gangrene [K43.9])    Surgeons: Alysha Sykes MD Provider: Hugo Avila DO    Anesthesia Type: general ASA Status: 3            Anesthesia Type: general    Vitals  Vitals Value Taken Time   /76 01/21/25 1525   Temp 97.6 °F (36.4 °C) 01/21/25 1455   Pulse 75 01/21/25 1527   Resp 16 01/21/25 1525   SpO2 91 % 01/21/25 1527   Vitals shown include unfiled device data.        Post Anesthesia Care and Evaluation    Patient location during evaluation: PHASE II  Patient participation: complete - patient participated  Level of consciousness: awake and alert  Pain score: 1  Pain management: adequate    Airway patency: patent  Anesthetic complications: No anesthetic complications  PONV Status: controlled  Cardiovascular status: acceptable  Respiratory status: acceptable  Hydration status: acceptable

## 2025-01-21 NOTE — ANESTHESIA PROCEDURE NOTES
"Peripheral Block      Patient reassessed immediately prior to procedure    Patient location during procedure: OR  Start time: 1/21/2025 1:10 PM  Stop time: 1/21/2025 1:15 PM  Reason for block: at surgeon's request and post-op pain management  Preanesthetic Checklist  Completed: patient identified, IV checked, site marked, risks and benefits discussed, surgical consent, monitors and equipment checked, pre-op evaluation and timeout performed  Prep:  Pt Position: supine  Sterile barriers:cap, gloves, sterile barriers and mask  Prep: ChloraPrep  Patient monitoring: blood pressure monitoring, continuous pulse oximetry and EKG  Procedure    Nursing cardiac assessment comments yes: Sedation, GA, Spinal,Epidural   Performed under: general  Guidance:ultrasound guided    ULTRASOUND INTERPRETATION.  Using ultrasound guidance a 20 G (20g 4\" Stimuplex) gauge needle was placed in close proximity to the nerve, at which point, under ultrasound guidance anesthetic was injected in the area of the nerve and spread of the anesthesia was seen on ultrasound in close proximity thereto.  There were no abnormalities seen on ultrasound; a digital image was taken; and the patient tolerated the procedure with no complications. Images:still images obtained    Laterality:Bilateral  Block Type:TAP  Injection Technique:single-shot  Needle Type:short-bevel  Needle Gauge:20 G  Resistance on Injection: none    Medications Used: ropivacaine (NAROPIN) injection 0.5 % - Peripheral Nerve   200 mg - 1/21/2025 1:15:00 PM      Medications  Preservative Free Saline:20ml  Comment:Block Injection:  Total volume divided equally between Right and Left block   40cc 0.5naropin      Post Assessment  Injection Assessment: negative aspiration for heme, incremental injection and no paresthesia on injection  Patient Tolerance:comfortable throughout block  Complications:no  Additional Notes  The pt was in the supine position under general anesthesia.    Under Ultrasound " guidance, a BBraun 4inch 360 degree needle was advanced with Normal Saline hydro dissection of tissue.  The Internal Oblique and Transversus Abdominus muscles where visualized.  At or before the aponeurosis of Internal Oblique, local anesthetic spread was visualized in the Transversus Abdominus Plane. Injection was made incrementally with aspiration every 5 mls.  There was no  intravascular injection,  injection pressure was normal, there was no neural injection, and the procedure was completed without difficulty. The same procedure was completed for left and right sided tap blocks. Thank You. 40cc A 500 cell count, performed on an aspirate smear, yielded the following results:         Performed by: Carroll Acharya CRNA

## 2025-01-21 NOTE — ANESTHESIA PROCEDURE NOTES
Airway  Date/Time: 1/21/2025 1:03 PM    Indications and Patient Condition  Indications for airway management: airway protection    Preoxygenated: yes  MILS maintained throughout  Mask difficulty assessment: 1 - vent by mask    Final Airway Details  Final airway type: endotracheal airway      Successful airway: ETT     Successful intubation technique: direct laryngoscopy  Endotracheal tube insertion site: oral  Blade size: 4  ETT size (mm): 7.0

## 2025-01-30 ENCOUNTER — OFFICE VISIT (OUTPATIENT)
Dept: SURGERY | Facility: CLINIC | Age: 65
End: 2025-01-30
Payer: MEDICARE

## 2025-01-30 VITALS
WEIGHT: 186.4 LBS | SYSTOLIC BLOOD PRESSURE: 118 MMHG | HEART RATE: 110 BPM | HEIGHT: 65 IN | BODY MASS INDEX: 31.06 KG/M2 | DIASTOLIC BLOOD PRESSURE: 82 MMHG

## 2025-01-30 DIAGNOSIS — K43.9 VENTRAL HERNIA WITHOUT OBSTRUCTION OR GANGRENE: Primary | ICD-10-CM

## 2025-01-30 DIAGNOSIS — Z09 POSTOP CHECK: ICD-10-CM

## 2025-01-30 PROCEDURE — 99024 POSTOP FOLLOW-UP VISIT: CPT | Performed by: SURGERY

## 2025-01-30 PROCEDURE — 3079F DIAST BP 80-89 MM HG: CPT | Performed by: SURGERY

## 2025-01-30 PROCEDURE — 3074F SYST BP LT 130 MM HG: CPT | Performed by: SURGERY

## 2025-01-30 PROCEDURE — 1159F MED LIST DOCD IN RCRD: CPT | Performed by: SURGERY

## 2025-01-30 PROCEDURE — 1160F RVW MEDS BY RX/DR IN RCRD: CPT | Performed by: SURGERY

## 2025-01-30 NOTE — PROGRESS NOTES
Subjective   Em Marrero is a 64 y.o. female here today for post op.    History of Present Illness  Ms. Marrero was seen in the office today for her first postoperative visit following laparoscopic repair of multiple ventral hernias.  She states she has had some back pain and shoulder pain and expected incisional pain but otherwise is doing well.  Allergies   Allergen Reactions    Coreg [Carvedilol] Shortness Of Breath    Metoprolol Hives    Atorvastatin Myalgia     leg cramps    Dexamethasone Rash    Ketorolac Rash    Ketorolac Tromethamine Rash    Levothyroxine Sodium Other (See Comments)     Blister on lip  bust my lips open         Current Outpatient Medications   Medication Sig Dispense Refill    albuterol sulfate  (90 Base) MCG/ACT inhaler Inhale 2 puffs Every 4 (Four) Hours As Needed for Wheezing.      amitriptyline (ELAVIL) 50 MG tablet Take 1 tablet by mouth every night at bedtime.      ANORO ELLIPTA 62.5-25 MCG/INH aerosol powder  Inhale 1 puff Daily.  2    Cartia  MG 24 hr capsule Take 1 capsule by mouth once daily 90 capsule 0    Cholecalciferol 125 MCG (5000 UT) tablet Take 1 tablet by mouth Daily.      cyclobenzaprine (FLEXERIL) 10 MG tablet Take 1 tablet by mouth Daily.      dexlansoprazole (DEXILANT) 60 MG capsule Take 1 capsule by mouth Daily.      Evolocumab (REPATHA) solution auto-injector SureClick injection Inject 1 mL under the skin into the appropriate area as directed Every 14 (Fourteen) Days. 6 mL 2    famotidine (PEPCID) 20 MG tablet Take 1 tablet by mouth 2 (Two) Times a Day.      furosemide (LASIX) 20 MG tablet Take 1 tablet by mouth As Needed (LE edema). 30 tablet 6    meloxicam (MOBIC) 7.5 MG tablet Take 2 tablets by mouth Daily.      montelukast (SINGULAIR) 10 MG tablet Take 1 tablet by mouth Every Night.      nitroglycerin (NITROSTAT) 0.4 MG SL tablet 1 under the tongue as needed for angina, may repeat q5mins for up three doses 100 tablet 11    O2 (OXYGEN) Inhale 2 L/min  "Every Night.      ondansetron (ZOFRAN) 8 MG tablet Take 1 tablet by mouth every 8 (eight) hours as needed for nausea or vomiting. 25 tablet 1    Plecanatide (Trulance) 3 MG tablet Take 1 tablet by mouth Daily.      potassium chloride 10 MEQ CR tablet TAKE 1 TABLET BY MOUTH TWICE DAILY (Patient taking differently: 2 (Two) Times a Day As Needed.) 180 tablet 3    sucralfate (CARAFATE) 1 g tablet Take 1 tablet by mouth 4 (Four) Times a Day As Needed.      Tirzepatide (MOUNJARO) 10 MG/0.5ML solution pen-injector pen Inject 10 mg under the skin into the appropriate area as directed 1 (One) Time Per Week.      oxyCODONE-acetaminophen (Percocet) 5-325 MG per tablet Take 1 tablet by mouth Every 6 (Six) Hours As Needed for Moderate Pain for up to 20 doses. (Patient not taking: Reported on 1/30/2025) 20 tablet 0     No current facility-administered medications for this visit.       Objective   /82 (BP Location: Left arm)   Pulse 110   Ht 165.1 cm (65\")   Wt 84.6 kg (186 lb 6.4 oz)   BMI 31.02 kg/m²    Physical Exam  This is a well-developed well-nourished female in no acute distress  HEENT examination: Sclera are anicteric  Abdomen: Abdomen is soft, nontender  Skin/incisions: Incision sites were inspected and demonstrate no drainage or erythema  Results/Data      Procedures     Assessment & Plan   Stable course, status post laparoscopic repair of multiple ventral hernias    Follow-up as needed  Level of activity discussed       Discussion/Summary    BMI is >= 30 and <35. (Class 1 Obesity). The following options were offered after discussion;: nutrition counseling/recommendations       Future Appointments   Date Time Provider Department Center   1/30/2025  9:05 AM Alysha Sykes MD [facility]   9/9/2025 10:30 AM López Bowles PA-C [facility]         This document has been electronically signed by Daniela Leong MA   January 30, 2025 08:58 EST      Please note that portions of this note " were completed with a voice recognition program.

## 2025-03-18 ENCOUNTER — SPECIALTY PHARMACY (OUTPATIENT)
Dept: PHARMACY | Facility: HOSPITAL | Age: 65
End: 2025-03-18
Payer: MEDICARE

## 2025-03-18 NOTE — PROGRESS NOTES
Specialty Pharmacy Patient Management Program  Medication Management Clinic Refill Outreach      Em was contacted today regarding refills of her medication(s).    Specialty medication(s) and dose(s) confirmed: repatha sureclick    Refill Questions      Flowsheet Row Most Recent Value   Changes to allergies? No   Changes to medications? No   New conditions or infections since last clinic visit No   Unplanned office visit, urgent care, ED, or hospital admission in the last 4 weeks  No   How does patient/caregiver feel medication is working? Very good   Financial problems or insurance changes  No   Since the previous refill, were any specialty medication doses or scheduled injections missed or delayed?  No   Does this patient require a clinical escalation to a pharmacist? No          Delivery Questions      Flowsheet Row Most Recent Value   Delivery method UPS   Delivery address verified with patient/caregiver? Yes   Delivery address Home   Number of medications in delivery 1   Medication(s) being filled and delivered Evolocumab (REPATHA)   Doses left of specialty medications 0   Copay verified? Yes   Copay amount $0.00   Copay form of payment No copayment ($0)   Delivery Date Selection 03/20/25   Signature Required No            Follow-Up: 84 days    Allegra Edgar, PharmD  3/18/2025  13:07 EDT

## 2025-04-24 ENCOUNTER — SPECIALTY PHARMACY (OUTPATIENT)
Dept: PHARMACY | Facility: HOSPITAL | Age: 65
End: 2025-04-24
Payer: MEDICARE

## 2025-04-24 RX ORDER — TIRZEPATIDE 12.5 MG/.5ML
12.5 INJECTION, SOLUTION SUBCUTANEOUS WEEKLY
COMMUNITY
Start: 2024-12-17

## 2025-04-24 RX ORDER — MELOXICAM 15 MG/1
15 TABLET ORAL DAILY
COMMUNITY
Start: 2024-12-17

## 2025-04-24 NOTE — PROGRESS NOTES
Medication Management Clinic/ Specialty Pharmacy Patient Management Program  Lipid Management Program - PCSK9i Initial Assessment     Em Marrero is a 64 y.o. female referred by their provider, López Bowles, to the Hyperlipidemia Patient Management program offered by [facility] for Lipid Management.  Em Marrero is  treated for hyperlipidemia and currently takes Repatha 140mg every 2 weeks.  In the past, Pt has tried Lipitor, Crestor, and pravastatin and could not tolerate due to myalgias.  Patient has also been on Praluent in the past but had to switch due to insurance. The patient denies any allergies to latex.       A follow-up outreach was conducted, including assessment of continued therapy appropriateness, medication adherence, and side effect incidence and management for Repatha. The patient denies any trouble giving themself the injection.  They deny missing doses or adverse effects.     Changes to Insurance Coverage or Financial Support  No changes- [employer]etna and medicaid    Relevant Past Medical History and Comorbidities  Relevant medical history and concomitant health conditions were discussed with the patient. The patient's chart has been reviewed for relevant past medical history and comorbid health conditions and updated as necessary.   Past Medical History:   Diagnosis Date    Arthritis     COPD (chronic obstructive pulmonary disease)     Diabetes mellitus     Dyslipidemia     Elevated cholesterol     GERD (gastroesophageal reflux disease)     Hypertension     Hypokalemia     Oxygen dependent     2L AT NIGHT    Palpitations     PONV (postoperative nausea and vomiting)      Social History     Socioeconomic History    Marital status: Legally    Tobacco Use    Smoking status: Former     Types: Electronic Cigarette    Smokeless tobacco: Never   Vaping Use    Vaping status: Every Day    Substances: Nicotine    Devices: Pre-filled or  refillable cartridge   Substance and Sexual Activity    Alcohol use: No    Drug use: No    Sexual activity: Defer     Birth control/protection: Hysterectomy     Problem list reviewed by Allegra Edgar, PharmD on 4/24/2025 at  3:14 PM    Hospitalizations and Urgent Care Since Last Assessment  ED Visits, Admissions, or Hospitalizations: surgery on 1/21/25 (umbilical hernia)  Urgent Office Visits: none    Allergies  Known allergies and reactions were discussed with the patient. The patient's chart has been reviewed for allergy information and updated as necessary.   Allergies   Allergen Reactions    Coreg [Carvedilol] Shortness Of Breath    Metoprolol Hives    Atorvastatin Myalgia     leg cramps    Dexamethasone Rash    Ketorolac Rash    Ketorolac Tromethamine Rash    Levothyroxine Sodium Other (See Comments)     Blister on lip  bust my lips open     Allergies reviewed by Allegra Edgar, PharmD on 4/24/2025 at  3:12 PM    Relevant Laboratory Values  Relevant laboratory values were discussed with the patient. The following specialty medication dose adjustment(s) are recommended: none    Lab Results   Component Value Date    GLUCOSE 128 (H) 01/17/2025    CALCIUM 9.1 01/17/2025     01/17/2025    K 4.4 01/17/2025    CO2 23.5 01/17/2025     01/17/2025    BUN 17 01/17/2025    CREATININE 0.75 01/17/2025    EGFRIFAFRI 71 08/19/2024    EGFRIFNONA 72 04/16/2021    BCR 22.7 01/17/2025    ANIONGAP 8.5 01/17/2025         Current Medication List  This medication list has been reviewed with the patient and evaluated for any interactions or necessary modifications/recommendations, and updated to include all prescription medications, OTC medications, and supplements the patient is currently taking.  This list reflects what is contained in the patient's profile, which has also been marked as reviewed to communicate to other providers it is the most up to date version of the patient's current medication therapy.      Current Outpatient Medications:     albuterol sulfate  (90 Base) MCG/ACT inhaler, Inhale 2 puffs Every 4 (Four) Hours As Needed for Wheezing., Disp: , Rfl:     amitriptyline (ELAVIL) 50 MG tablet, Take 1 tablet by mouth every night at bedtime., Disp: , Rfl:     ANORO ELLIPTA 62.5-25 MCG/INH aerosol powder , Inhale 1 puff Daily., Disp: , Rfl: 2    Cartia  MG 24 hr capsule, Take 1 capsule by mouth once daily, Disp: 90 capsule, Rfl: 0    Cholecalciferol 125 MCG (5000 UT) tablet, Take 1 tablet by mouth Daily., Disp: , Rfl:     dexlansoprazole (DEXILANT) 60 MG capsule, Take 1 capsule by mouth Daily., Disp: , Rfl:     Evolocumab (REPATHA) solution auto-injector SureClick injection, Inject 1 mL under the skin into the appropriate area as directed Every 14 (Fourteen) Days., Disp: 6 mL, Rfl: 2    famotidine (PEPCID) 20 MG tablet, Take 1 tablet by mouth 2 (Two) Times a Day., Disp: , Rfl:     meloxicam (MOBIC) 15 MG tablet, Take 1 tablet by mouth Daily., Disp: , Rfl:     montelukast (SINGULAIR) 10 MG tablet, Take 1 tablet by mouth Every Night., Disp: , Rfl:     Mounjaro 12.5 MG/0.5ML solution auto-injector, Inject 0.5 mL under the skin into the appropriate area as directed 1 (One) Time Per Week., Disp: , Rfl:     nitroglycerin (NITROSTAT) 0.4 MG SL tablet, 1 under the tongue as needed for angina, may repeat q5mins for up three doses, Disp: 100 tablet, Rfl: 11    O2 (OXYGEN), Inhale 2 L/min Every Night., Disp: , Rfl:     ondansetron (ZOFRAN) 8 MG tablet, Take 1 tablet by mouth every 8 (eight) hours as needed for nausea or vomiting., Disp: 25 tablet, Rfl: 1    sucralfate (CARAFATE) 1 g tablet, Take 1 tablet by mouth 4 (Four) Times a Day As Needed., Disp: , Rfl:     cyclobenzaprine (FLEXERIL) 10 MG tablet, Take 1 tablet by mouth 3 (Three) Times a Day As Needed for Muscle Spasms., Disp: , Rfl:     furosemide (LASIX) 20 MG tablet, Take 1 tablet by mouth As Needed (LE edema)., Disp: 30 tablet, Rfl: 6    Plecanatide  (Trulance) 3 MG tablet, Take 1 tablet by mouth Daily., Disp: , Rfl:     Medicines reviewed by Allegra Edgar, PharmD on 4/24/2025 at  3:13 PM    Drug Interactions  None with repatha    Adverse Drug Reactions  Medication tolerability: Tolerating with no to minimal ADRs  Medication plan: Continue therapy with normal follow-up  Plan for ADR Management: n/a    Adherence, Self-Administration, and Current Therapy Problems  Adherence related to the patient's specialty therapy was discussed with the patient. The Adherence segment of this outreach has been reviewed and updated.     Adherence Questions  Linked Medication(s) Assessed: Evolocumab (REPATHA)  On average, how many doses/injections does the patient miss per month?: 0  What are the identified reasons for non-adherence or missed doses? : no problems identified  What is the estimated medication adherence level?: %  Based on the patient/caregiver response and refill history, does this patient require an MTP to track adherence improvements?: no    Additional Barriers to Patient Self-Administration: none  Methods for Supporting Patient Self-Administration: calendar    Open Medication Therapy Problems  No medication therapy recommendations to display    Goals of Therapy  Goals related to the patient's specialty therapy were discussed with the patient. The Patient Goals segment of this outreach has been reviewed and updated.   Goals Addressed Today        Specialty Pharmacy General Goal      LDL <100    4/24/25 [place]: LDL at goal, 56 mg/dl as of 3/14/25              Quality of Life Assessment   Quality of Life related to the patient's enrollment in the patient management program and services provided was discussed with the patient. The QOL segment of this outreach has been reviewed and updated.  Quality of Life Improvement Scale: 10-Significantly better    Reassessment Plan & Follow-Up  1. Medication Therapy Changes:   Discontinued oxycodone-apap and potassium  cl  Reports meloxicam is 15 mg tablet, mounjaro was increased to 12.5 mg weekly, and cyclobenzaprine is TID prn (previously marked as QD)  2. Related Plans, Therapy Recommendations, or Issues to Be Addressed:   LDL at goal  3. Pharmacist to perform regular assessments no more than (6) months from the previous assessment.  Patient will continue regular follow-up with referring provider.   4. Care Coordinator to set up future refill outreaches, coordinate prescription delivery, and escalate clinical questions to pharmacist.    Attestation  Therapeutic appropriateness: Appropriate   I attest the patient was actively involved in and has agreed to the above plan of care.  If the prescribed therapy is at any point deemed not appropriate based on the current or future assessments, a consultation will be initiated with the patient's specialty care provider to determine the best course of action. The revised plan of therapy will be documented along with any required assessments and/or additional patient education provided.     Allegra Edgar, PharmD  4/24/2025  15:15 EDT

## 2025-05-21 ENCOUNTER — SPECIALTY PHARMACY (OUTPATIENT)
Dept: PHARMACY | Facility: HOSPITAL | Age: 65
End: 2025-05-21
Payer: MEDICARE

## 2025-05-21 NOTE — PROGRESS NOTES
Specialty Pharmacy Patient Management Program  Refill Outreach     Em was contacted today regarding refills of their medication(s).    Refill Questions      Flowsheet Row Most Recent Value   Changes to allergies? No   Changes to medications? No   New conditions or infections since last clinic visit No   Unplanned office visit, urgent care, ED, or hospital admission in the last 4 weeks  No   How does patient/caregiver feel medication is working? Good   Financial problems or insurance changes  No   Since the previous refill, were any specialty medication doses or scheduled injections missed or delayed?  No   Does this patient require a clinical escalation to a pharmacist? No            Delivery Questions      Flowsheet Row Most Recent Value   Delivery method UPS   Delivery address verified with patient/caregiver? Yes   Delivery address Home   Medication(s) being filled and delivered Evolocumab (REPATHA)   Copay verified? Yes   Copay amount $0   Copay form of payment No copayment ($0)   Delivery Date Selection 05/23/25   Signature Required No   Do you consent to receive electronic handouts?  No            Medication Adherence    Adherence tools used: calendar  Support network for adherence: family member          Follow-up: 84 day(s)     Anjana Mendez, Pharmacy Technician  5/21/2025  16:27 EDT

## 2025-06-30 ENCOUNTER — TRANSCRIBE ORDERS (OUTPATIENT)
Dept: ADMINISTRATIVE | Facility: HOSPITAL | Age: 65
End: 2025-06-30
Payer: MEDICARE

## 2025-06-30 DIAGNOSIS — F17.210 CIGARETTE SMOKER: Primary | ICD-10-CM

## 2025-07-28 ENCOUNTER — HOSPITAL ENCOUNTER (OUTPATIENT)
Facility: HOSPITAL | Age: 65
Discharge: HOME | End: 2025-07-28
Admitting: NURSE PRACTITIONER
Payer: MEDICARE

## 2025-07-28 DIAGNOSIS — F17.210 CIGARETTE SMOKER: ICD-10-CM

## 2025-07-28 PROCEDURE — 71271 CT THORAX LUNG CANCER SCR C-: CPT | Performed by: RADIOLOGY

## 2025-07-28 PROCEDURE — 71271 CT THORAX LUNG CANCER SCR C-: CPT

## 2025-08-13 ENCOUNTER — SPECIALTY PHARMACY (OUTPATIENT)
Dept: PHARMACY | Facility: HOSPITAL | Age: 65
End: 2025-08-13
Payer: MEDICARE

## (undated) DEVICE — INTENDED FOR TISSUE SEPARATION, AND OTHER PROCEDURES THAT REQUIRE A SHARP SURGICAL BLADE TO PUNCTURE OR CUT.: Brand: BARD-PARKER ® CARBON RIB-BACK BLADES

## (undated) DEVICE — TROCAR: Brand: KII FIOS FIRST ENTRY

## (undated) DEVICE — ENDOPATH XCEL BLADELESS TROCARS WITH STABILITY SLEEVES: Brand: ENDOPATH XCEL

## (undated) DEVICE — ANTIBACTERIAL VIOLET BRAIDED (POLYGLACTIN 910), SYNTHETIC ABSORBABLE SUTURE: Brand: COATED VICRYL

## (undated) DEVICE — MARKER,SKIN,WI/RULER AND LABELS: Brand: MEDLINE

## (undated) DEVICE — 2, DISPOSABLE SUCTION/IRRIGATOR WITH DISPOSABLE TIP: Brand: STRYKEFLOW

## (undated) DEVICE — BNDR ABD PREM 3PNL 9IN 46TO62IN

## (undated) DEVICE — TROCAR: Brand: KII SLEEVE

## (undated) DEVICE — INSUFFLATION NEEDLE TO ESTABLISH PNEUMOPERITONEUM.: Brand: INSUFFLATION NEEDLE

## (undated) DEVICE — SUT VIC 0/0 CT1 27IN J260H

## (undated) DEVICE — INTENDED FOR TISSUE SEPARATION, AND OTHER PROCEDURES THAT REQUIRE A SHARP SURGICAL BLADE TO PUNCTURE OR CUT.: Brand: BARD-PARKER ® STAINLESS STEEL BLADES

## (undated) DEVICE — HARMONIC 700 SHEARS, ADVANCED HEMOSTASIS: Brand: HARMONIC

## (undated) DEVICE — SYR LL TP 10ML STRL

## (undated) DEVICE — SUT MNCRYL PLS ANTIB UD 4/0 PS2 18IN

## (undated) DEVICE — NDL HYPO ECLPS SFTY 18G 1 1/2IN

## (undated) DEVICE — ADHS SKIN PREMIERPRO EXOFIN TOPICAL HI/VISC .5ML

## (undated) DEVICE — GLV SURG PREMIERPRO MIC LTX PF SZ7 BRN

## (undated) DEVICE — PK LAP GEN 70

## (undated) DEVICE — ELECTRD BLD EZ CLN MOD XLNG 2.75IN

## (undated) DEVICE — PENCL ES MEGADINE EZ/CLEAN BUTN W/HOLSTR 10FT

## (undated) DEVICE — DRSNG WND BORDR/ADHS NONADHR/GZ LF 2X2IN STRL

## (undated) DEVICE — 40595 XL TRENDELENBURG POSITIONING KIT: Brand: 40595 XL TRENDELENBURG POSITIONING KIT

## (undated) DEVICE — BNDR ABD 4PANEL 12IN 46 TO 62IN

## (undated) DEVICE — 1LYRTR 16FR10ML100%SIL UMS SNP: Brand: MEDLINE INDUSTRIES, INC.

## (undated) DEVICE — LAPAROSCOPIC DISSECTOR: Brand: DEROYAL